# Patient Record
Sex: FEMALE | Race: WHITE | NOT HISPANIC OR LATINO | Employment: OTHER | URBAN - METROPOLITAN AREA
[De-identification: names, ages, dates, MRNs, and addresses within clinical notes are randomized per-mention and may not be internally consistent; named-entity substitution may affect disease eponyms.]

---

## 2017-06-30 ENCOUNTER — THERAPY VISIT (OUTPATIENT)
Dept: PHYSICAL THERAPY | Facility: CLINIC | Age: 77
End: 2017-06-30
Payer: MEDICARE

## 2017-06-30 DIAGNOSIS — M54.42 LEFT-SIDED LOW BACK PAIN WITH LEFT-SIDED SCIATICA: ICD-10-CM

## 2017-06-30 DIAGNOSIS — M25.552 HIP PAIN, LEFT: Primary | ICD-10-CM

## 2017-06-30 PROCEDURE — 97110 THERAPEUTIC EXERCISES: CPT | Mod: GP | Performed by: PHYSICAL THERAPIST

## 2017-06-30 PROCEDURE — G0283 ELEC STIM OTHER THAN WOUND: HCPCS | Mod: GP | Performed by: PHYSICAL THERAPIST

## 2017-06-30 PROCEDURE — 97140 MANUAL THERAPY 1/> REGIONS: CPT | Mod: GP | Performed by: PHYSICAL THERAPIST

## 2017-06-30 PROCEDURE — G8981 BODY POS CURRENT STATUS: HCPCS | Mod: GP | Performed by: PHYSICAL THERAPIST

## 2017-06-30 PROCEDURE — 97161 PT EVAL LOW COMPLEX 20 MIN: CPT | Mod: GP | Performed by: PHYSICAL THERAPIST

## 2017-06-30 PROCEDURE — G8982 BODY POS GOAL STATUS: HCPCS | Mod: GP | Performed by: PHYSICAL THERAPIST

## 2017-06-30 NOTE — PROGRESS NOTES
Subjective:    Patient is a 76 year old female presenting with rehab left hip hpi.   Elzbieta Ivan is a 76 year old female with a left hip condition.  Condition occurred with:  Insidious onset.  Condition occurred: at work.  This is a new condition  MD order 6/7/17. Elzbieta noticed pain in her left hip 3 weeks ago at work, sudden excruciating pain travelling from hip to the lower leg while twisting her hip to the left side to lift some books. The pain was worse she sat down in her chair, the pain got better in her lower back and leg and localized to her left hip. She took Excedrin, the same episode happened again the next day while walking when her left hip gave out and she feel against the wall and rested there for a while. In few days any movement in her legs aggravated her pain so went to [facility]. X ray was taken and was diagnosed with trochanteric bursitis. She was given cortisone shot which did not help. She went to ER next day, MRI was not taken as he legs were in spasm. She was given morphine shot and was given aleve. She went to [facility] next day and was ordered standing MRI. She later took it for hip, which came out clear. She met with [facility] provider, given a shot and took MRI for back and was referred to PT..    Patient reports pain:  Anterior, posterior, lateral and greater trochanter.  Radiates to:  Low back, lower leg and thigh (groin).  Pain is described as aching, sharp and shooting and is constant and reported as 5/10 and 9/10.  Associated symptoms:  Buckling/giving out, loss of motion/stiffness and loss of strength. Pain is the same all the time.  Symptoms are exacerbated by walking, transfers, standing, ascending stairs, descending stairs and lying on extremity (stepping left, reaching with right arm overhead, ) and relieved by rest, NSAID's and analgesics.  Since onset symptoms are gradually improving.  Special tests:  X-ray and MRI.      General health as reported by patient is good.   Pertinent medical history includes:  Osteoarthritis, cancer, depression and fibromyalgia (pain at rest/ night, numbness/ tingling, apnea).  Medical allergies: scopalomine.  Other surgeries include:  Orthopedic surgery (cervical 1965, knee scope, shoulder, hysterectomy, tonsils, appendectomy).  Current medications:  Anti-inflammatory, pain medication and anti-depressants.  Current occupation is Psychologist-sitting .                                    Objective:      Gait:  Decreased jak, step length, stride length  Gait Type:  Antalgic     Deviations:  Lumbar:  Trunk lean R and trunk flexionPelvis:  Decr pelvic rotationHip:  Excessive flexion LKnee:  Knee extension decr L    Flexibility/Screens:       Lower Extremity:  Decreased left lower extremity flexibility:Hip ER's; Adductors; Piriformis; Hip Flexors; Hamstrings and Gastroc                 Lumbar/SI Evaluation              Lumbar Palpation:    Tenderness present at Left:    Piriformis and PSIS          SI joint/Sacrum:        Left positive at:    Squish                                        Hip Evaluation  HIP AROM:  AROM:   Left Hip:        Right Hip:   Normal: unable to extend the hip in lying position due to increased pain in her lateral and anterior hip, Flx with knee flexed to chest with no pain- feels better, IR/ ER and abd is WFL with no pain                     Hip Strength:  Hip Strength:   Left:    Not assessed  Right:                           Hip Special Testing:   Special tests hip not assessed: inconsistent pain with palpation.  Left hip positive for the following special tests:  Piriformis and Cristian      Hip Palpation:    Left hip tenderness present at:   Greater Trachanter; hip flexors; Piriformis; PSIS; Abductors; Gluteus Medius and Bursa               General     ROS    Assessment/Plan:      Patient is a 76 year old female with lumbar, sacral and left side hip complaints.    Patient has the following significant findings with  corresponding treatment plan.                Diagnosis 1:  Left Hip pain, LBP, SI joint pain  Pain -  hot/cold therapy, US, electric stimulation, manual therapy, STS, splint/taping/bracing/orthotics, self management, education and home program  Decreased ROM/flexibility - manual therapy, therapeutic exercise, therapeutic activity and home program  Decreased strength - therapeutic exercise, therapeutic activities and home program  Inflammation - cold therapy, US, electric stimulation and self management/home program  Impaired gait - gait training and home program  Decreased function - therapeutic activities and home program  Impaired posture - neuro re-education, therapeutic activities and home program    Therapy Evaluation Codes:   1) History comprised of:   Personal factors that impact the plan of care:      Time since onset of symptoms.    Comorbidity factors that impact the plan of care are:      Cancer, Depression, Fibromyalgia, Pain at night/rest and Sleep disorder/apnea.     Medications impacting care: Anti-inflammatory, Pain and Steroids.  2) Examination of Body Systems comprised of:   Body structures and functions that impact the plan of care:      Hip, Lumbar spine and Sacral illiac joint.   Activity limitations that impact the plan of care are:      Dressing, Standing, Walking, Sleeping and Laying down.  3) Clinical presentation characteristics are:   Stable/Uncomplicated.  4) Decision-Making    Low complexity using standardized patient assessment instrument and/or measureable assessment of functional outcome.  Cumulative Therapy Evaluation is: Low complexity.    Previous and current functional limitations:  (See Goal Flow Sheet for this information)    Short term and Long term goals: (See Goal Flow Sheet for this information)     Communication ability:  Patient appears to be able to clearly communicate and understand verbal and written communication and follow directions correctly.  Treatment Explanation  - The following has been discussed with the patient:   RX ordered/plan of care  Anticipated outcomes  Possible risks and side effects  This patient would benefit from PT intervention to resume normal activities.   Rehab potential is fair.    Frequency:  2 X week, once daily  Duration:  for 6 weeks  Discharge Plan:  Achieve all LTG.  Independent in home treatment program.  Reach maximal therapeutic benefit.    Patient presented with inconsistent s/s today, some muscle were tender upon palpation in one position as compared to other position. Same muscles were not tender after some time with palpation, some movements were extremly painful but later better. Will try to get the MRI details [facility] to get a better understanding into the problem.     Please refer to the daily flowsheet for treatment today, total treatment time and time spent performing 1:1 timed codes.

## 2017-06-30 NOTE — LETTER
DEPARTMENT OF HEALTH AND HUMAN SERVICES  CENTERS FOR MEDICARE & MEDICAID SERVICES    PLAN/UPDATED PLAN OF PROGRESS FOR OUTPATIENT REHABILITATION    PATIENTS NAME:  Elzbieta Ivan     : 1940    PROVIDER NUMBER:    [id number]    PsychiatricN:   A    [facility]    MEDICAL RECORD NUMBER: [id number]     START OF CARE DATE:  SOC Date: 17   TYPE:  PT    PRIMARY/TREATMENT DIAGNOSIS: (Pertinent Medical Diagnosis)     Hip pain, left  Left-sided low back pain with left-sided sciatica    VISITS FROM START OF CARE:  Rxs Used: 1     Subjective:    Elzbieta Ivan is a 76 year old female with a left hip condition.  Condition occurred with:  Insidious onset.  Condition occurred: at work.  This is a new condition  MD order 17. Elzbieta noticed pain in her left hip 3 weeks ago at work, sudden excruciating pain travelling from hip to the lower leg while twisting her hip to the left side to lift some books. The pain was worse she sat down in her chair, the pain got better in her lower back and leg and localized to her left hip. She took Excedrin, the same episode happened again the next day while walking when her left hip gave out and she feel against the wall and rested there for a while. In few days any movement in her legs aggravated her pain so went to [facility]. X ray was taken and was diagnosed with trochanteric bursitis. She was given cortisone shot which did not help. She went to ER next day, MRI was not taken as he legs were in spasm. She was given morphine shot and was given aleve. She went to [facility] next day and was ordered standing MRI. She later took it for hip, which came out clear. She met with [facility] provider, given a shot and took MRI for back and was referred to PT.  Patient reports pain:  Anterior, posterior, lateral and greater trochanter.  Radiates to:  Low back, lower leg and thigh (groin).  Pain is described as aching, sharp and shooting and is constant  and reported as 5/10 and 9/10.  Associated symptoms:  Buckling/giving out, loss of motion/stiffness and loss of strength. Pain is the same all the time.  Symptoms are exacerbated by walking, transfers, standing, ascending stairs, descending stairs and lying on extremity (stepping left, reaching with right arm overhead, ) and relieved by rest, NSAID's and analgesics.  Since onset symptoms are gradually improving.  Special tests:  X-ray and MRI.      General health as reported by patient is good.  Pertinent medical history includes:  Osteoarthritis, cancer, depression and fibromyalgia (pain at rest/ night, numbness/ tingling, apnea).  Medical allergies: scopalomine.  Other surgeries include:  Orthopedic surgery (cervical 1965, knee scope, shoulder, hysterectomy, tonsils, appendectomy).  Current medications:  Anti-inflammatory, pain medication and anti-depressants.  Current occupation is Psychologist-sitting .      PATIENTS NAME:  Elzbieta Ivan   : 1940  Page 2    Objective:    Gait:  Decreased jak, step length, stride length  Gait Type:  Antalgic     Deviations:  Lumbar:  Trunk lean R and trunk flexionPelvis:  Decr pelvic rotationHip:  Excessive flexion LKnee:  Knee extension decr L  Flexibility/Screens:   Lower Extremity:  Decreased left lower extremity flexibility:Hip ER's; Adductors; Piriformis; Hip Flexors; Hamstrings and Gastroc  Lumbar/SI Evaluation  Lumbar Palpation:    Tenderness present at Left:    Piriformis and PSIS  SI joint/Sacrum:      Left positive at:    Squish  Hip Evaluation  HIP AROM:  AROM:   Left Hip:        Right Hip:   Normal: unable to extend the hip in lying position due to increased pain in her lateral and anterior hip, Flx with knee flexed to chest with no pain- feels better, IR/ ER and abd is WFL with no pain   Hip Strength:  Hip Strength:   Left:    Not assessed  Right:   Hip Special Testing:   Special tests hip not assessed: inconsistent pain with palpation.  Left hip  positive for the following special tests:  Piriformis and Cristian  Hip Palpation:    Left hip tenderness present at:   Greater Trachanter; hip flexors; Piriformis; PSIS; Abductors; Gluteus Medius and Bursa    Assessment/Plan:      Patient is a 76 year old female with lumbar, sacral and left side hip complaints.    Patient has the following significant findings with corresponding treatment plan.                Diagnosis 1:  Left Hip pain, LBP, SI joint pain  Pain -  hot/cold therapy, US, electric stimulation, manual therapy, STS, splint/taping/bracing/orthotics, self management, education and home program  Decreased ROM/flexibility - manual therapy, therapeutic exercise, therapeutic activity and home program  Decreased strength - therapeutic exercise, therapeutic activities and home program  Inflammation - cold therapy, US, electric stimulation and self management/home program  Impaired gait - gait training and home program  Decreased function - therapeutic activities and home program  Impaired posture - neuro re-education, therapeutic activities and home program    Therapy Evaluation Codes:   1) History comprised of:   Personal factors that impact the plan of care:      Time since onset of symptoms.   PATIENTS NAME:  Elbzieta Ivan   : 1940  Page 3     Comorbidity factors that impact the plan of care are:      Cancer, Depression, Fibromyalgia, Pain at night/rest and Sleep disorder/apnea.     Medications impacting care: Anti-inflammatory, Pain and Steroids.  2) Examination of Body Systems comprised of:   Body structures and functions that impact the plan of care:      Hip, Lumbar spine and Sacral illiac joint.   Activity limitations that impact the plan of care are:      Dressing, Standing, Walking, Sleeping and Laying down.  3) Clinical presentation characteristics are:   Stable/Uncomplicated.  4) Decision-Making    Low complexity using standardized patient assessment instrument and/or measureable  "assessment of functional outcome.  Cumulative Therapy Evaluation is: Low complexity.    Previous and current functional limitations:  (See Goal Flow Sheet for this information)    Short term and Long term goals: (See Goal Flow Sheet for this information)     Communication ability:  Patient appears to be able to clearly communicate and understand verbal and written communication and follow directions correctly.  Treatment Explanation - The following has been discussed with the patient:   RX ordered/plan of care  Anticipated outcomes  Possible risks and side effects  This patient would benefit from PT intervention to resume normal activities.   Rehab potential is fair.    Frequency:  2 X week, once daily  Duration:  for 6 weeks  Discharge Plan:  Achieve all LTG.  Independent in home treatment program.  Reach maximal therapeutic benefit.    Patient presented with inconsistent s/s today, some muscle were tender upon palpation in one position as compared to other position. Same muscles were not tender after some time with palpation, some movements were extremly painful but later better. Will try to get the MRI details form [facility] to get a better understanding into the problem.     Caregiver Signature/Credentials _____________________________ Date ________       Treating Provider: Christin Rmoano, PT   I have reviewed and certified the need for these services and plan of treatment while under my care.        PHYSICIAN'S SIGNATURE:   _________________________________________  Date___________   Trent Arriola      PATIENTS NAME:  Chong Ivann   : 1940  Page 4      Certification period:  Beginning of Cert date period: 17 to  End of Cert period date: 17     Functional Level Progress Report: Please see attached \"Goal Flow sheet for Functional level.\"    ____X____ Continue Services or       ________ DC Services                Service dates: From  SOC Date: 17 date to present "

## 2017-07-03 ENCOUNTER — THERAPY VISIT (OUTPATIENT)
Dept: PHYSICAL THERAPY | Facility: CLINIC | Age: 77
End: 2017-07-03
Payer: MEDICARE

## 2017-07-03 DIAGNOSIS — M25.552 HIP PAIN, LEFT: ICD-10-CM

## 2017-07-03 DIAGNOSIS — M54.42 LEFT-SIDED LOW BACK PAIN WITH LEFT-SIDED SCIATICA: ICD-10-CM

## 2017-07-03 PROCEDURE — 97110 THERAPEUTIC EXERCISES: CPT | Mod: GP | Performed by: PHYSICAL THERAPIST

## 2017-07-03 PROCEDURE — 97140 MANUAL THERAPY 1/> REGIONS: CPT | Mod: GP | Performed by: PHYSICAL THERAPIST

## 2017-07-03 PROCEDURE — G0283 ELEC STIM OTHER THAN WOUND: HCPCS | Mod: GP | Performed by: PHYSICAL THERAPIST

## 2017-07-07 ENCOUNTER — THERAPY VISIT (OUTPATIENT)
Dept: PHYSICAL THERAPY | Facility: CLINIC | Age: 77
End: 2017-07-07
Payer: MEDICARE

## 2017-07-07 DIAGNOSIS — M54.42 LEFT-SIDED LOW BACK PAIN WITH LEFT-SIDED SCIATICA: ICD-10-CM

## 2017-07-07 DIAGNOSIS — M25.552 HIP PAIN, LEFT: ICD-10-CM

## 2017-07-07 PROCEDURE — G0283 ELEC STIM OTHER THAN WOUND: HCPCS | Mod: GP | Performed by: PHYSICAL THERAPIST

## 2017-07-07 PROCEDURE — 97110 THERAPEUTIC EXERCISES: CPT | Mod: GP | Performed by: PHYSICAL THERAPIST

## 2017-07-07 PROCEDURE — 97140 MANUAL THERAPY 1/> REGIONS: CPT | Mod: GP | Performed by: PHYSICAL THERAPIST

## 2017-07-10 ENCOUNTER — THERAPY VISIT (OUTPATIENT)
Dept: PHYSICAL THERAPY | Facility: CLINIC | Age: 77
End: 2017-07-10
Payer: MEDICARE

## 2017-07-10 DIAGNOSIS — M54.42 LEFT-SIDED LOW BACK PAIN WITH LEFT-SIDED SCIATICA: ICD-10-CM

## 2017-07-10 DIAGNOSIS — M25.552 HIP PAIN, LEFT: ICD-10-CM

## 2017-07-10 PROCEDURE — G8982 BODY POS GOAL STATUS: HCPCS | Mod: GP | Performed by: PHYSICAL THERAPIST

## 2017-07-10 PROCEDURE — G8983 BODY POS D/C STATUS: HCPCS | Mod: GP | Performed by: PHYSICAL THERAPIST

## 2017-07-10 PROCEDURE — 97110 THERAPEUTIC EXERCISES: CPT | Mod: GP | Performed by: PHYSICAL THERAPIST

## 2017-07-10 PROCEDURE — 97112 NEUROMUSCULAR REEDUCATION: CPT | Mod: GP | Performed by: PHYSICAL THERAPIST

## 2017-10-10 ENCOUNTER — THERAPY VISIT (OUTPATIENT)
Dept: PHYSICAL THERAPY | Facility: CLINIC | Age: 77
End: 2017-10-10
Payer: MEDICARE

## 2017-10-10 DIAGNOSIS — Z98.890 S/P LUMBAR DISCECTOMY: ICD-10-CM

## 2017-10-10 DIAGNOSIS — M54.50 LUMBAGO: Primary | ICD-10-CM

## 2017-10-10 PROCEDURE — G8979 MOBILITY GOAL STATUS: HCPCS | Mod: GP | Performed by: PHYSICAL THERAPIST

## 2017-10-10 PROCEDURE — 97161 PT EVAL LOW COMPLEX 20 MIN: CPT | Mod: GP | Performed by: PHYSICAL THERAPIST

## 2017-10-10 PROCEDURE — 97110 THERAPEUTIC EXERCISES: CPT | Mod: GP | Performed by: PHYSICAL THERAPIST

## 2017-10-10 PROCEDURE — G8978 MOBILITY CURRENT STATUS: HCPCS | Mod: GP | Performed by: PHYSICAL THERAPIST

## 2017-10-10 NOTE — LETTER
DEPARTMENT OF HEALTH AND HUMAN SERVICES  CENTERS FOR MEDICARE & MEDICAID SERVICES    PLAN/UPDATED PLAN OF PROGRESS FOR OUTPATIENT REHABILITATION    PATIENTS NAME:  Elzbieta Ivan     : 1940    PROVIDER NUMBER:    [id number]    [id number]    [facility]    MEDICAL RECORD NUMBER: [id number]     START OF CARE DATE:  SOC Date: 10/10/17   TYPE:  PT    PRIMARY/TREATMENT DIAGNOSIS: (Pertinent Medical Diagnosis)     Lumbago  S/P lumbar discectomy    VISITS FROM START OF CARE:  Rxs Used: 1     Subjective:    Patient is a 76 year old female presenting with rehab back hpi.   Elzbieta Ivan is a 76 year old female with a lumbar condition.  Occurance: s/p surgery.    This is a new condition  MD order 17. Elzbieta went lumbar discectomy(L4-5) on . She was resting for few weeks. She went to [facility] for vacation and noticed weakness in her left leg. Currently wants to learn exercises to strengthen her core and left lower extremity. She notices weakness while climbing the stairs, back hurts while waking up in the morning, prolonged sitting in her couch. She was referred to PT for further management. .    Patient reports pain:  Lower lumbar spine, lumbar spine right and lumbar spine left.    Pain is described as aching and is intermittent and reported as 2/10 and 5/10.  Associated symptoms:  Loss of strength, loss of motion/stiffness and fatigue.   Symptoms are exacerbated by sitting, walking, bending, lifting and carrying and relieved by rest and ice.  Since onset symptoms are gradually improving.    Previous treatment includes surgery.  There was moderate improvement following previous treatment.  General health as reported by patient is good.  Pertinent medical history includes:  Osteoarthritis, cancer, depression and fibromyalgia.  Medical allergies: yes (scopalomine).  Surgical history: cervical 1965, knee scope, shoulder, hysterectomy, tonsils, appendectomy.  Current  medications:  Anti-depressants.  Current occupation is RetiredPrimary job tasks include:  Prolonged sitting.                                Objective:    Flexibility/Screens:     Lower Extremity:  Decreased left lower extremity flexibility:Hamstrings; Gastroc and Soleus    Decreased right lower extremity flexibility:  Hamstrings; Gastroc and Soleus    Lumbar/SI Evaluation  ROM:    AROM Lumbar:   Flexion:            Till ankle with minimal pain  Ext:                    End range pain   Side Bend:        Left:  WFL    Right:  WFL  Rotation:           Left:  WFL    Right:  WFL  Side Glide:        Left:     Right:         Strength: weak abdominals 1+/5, poor TA activation, Back MMT 2/5  Lumbar Myotomes:  normal    Lumbar DTR's:  normal    Cord Signs:  normal    Lumbar Dermtomes:  normal    Neural Tension/Mobility:  Lumbar:  Normal      Lumbar Palpation:    Tenderness present at Left:    Erector Spinae and PSIS  Tenderness present at Right: Erector Spinae and PSIS    Lumbar Provocation:  normal      Shane Lumbar Evaluation    Posture:  Sitting: fair  Standing: fair  Lordosis: WNL  Lateral Shift: no  Correction of Posture: better    Conclusion: posture and dysfunction  Principle of Treatment:  Posture Correction: Sitting with back supported and lumbar roll advised    Extension: encouraged            Assessment/Plan:      Patient is a 76 year old female with lumbar complaints.    Patient has the following significant findings with corresponding treatment plan.                Diagnosis 1:  LBP and weakness- s/p discectomy L4-5  Pain -  hot/cold therapy, US, electric stimulation, manual therapy, STS, splint/taping/bracing/orthotics, self management, education, directional preference exercise and home program  Decreased ROM/flexibility - manual therapy, therapeutic exercise, therapeutic activity and home program  Decreased strength - therapeutic exercise, therapeutic activities and home program  Decreased function -  therapeutic activities and home program    Therapy Evaluation Codes:   1) History comprised of:   Personal factors that impact the plan of care:      Time since onset of symptoms.    Comorbidity factors that impact the plan of care are:      Cancer, Depression, Fibromyalgia and Osteoarthritis.     Medications impacting care: Anti-inflammatory, Muscle relaxant and Pain.  2) Examination of Body Systems comprised of:   Body structures and functions that impact the plan of care:      Lumbar spine.   Activity limitations that impact the plan of care are:      Bending, Lifting, Sitting, Stairs, Standing and Walking.  3) Clinical presentation characteristics are:   Stable/Uncomplicated.  4) Decision-Making    Low complexity using standardized patient assessment instrument and/or measureable assessment of functional outcome.  Cumulative Therapy Evaluation is: Low complexity.    Previous and current functional limitations:  (See Goal Flow Sheet for this information)    Short term and Long term goals: (See Goal Flow Sheet for this information)     Communication ability:  Patient appears to be able to clearly communicate and understand verbal and written communication and follow directions correctly.  Treatment Explanation - The following has been discussed with the patient:   RX ordered/plan of care  Anticipated outcomes  Possible risks and side effects  This patient would benefit from PT intervention to resume normal activities.   Rehab potential is good.    Frequency:  1 X week, once daily  Duration:  for 6 weeks  Discharge Plan:  Achieve all LTG.  Independent in home treatment program.  Reach maximal therapeutic benefit.              Caregiver Signature/Credentials _____________________________ Date ________       Treating Provider: Christin Romano, PT   I have reviewed and certified the need for these services and plan of treatment while under my care.        PHYSICIAN'S SIGNATURE:    "_________________________________________  Date___________   Mansi Chavez CNP    Certification period:  Beginning of Cert date period: 10/10/17 to  End of Cert period date: 01/07/18     Functional Level Progress Report: Please see attached \"Goal Flow sheet for Functional level.\"    ____X____ Continue Services or       ________ DC Services                Service dates: From  SOC Date: 10/10/17 date to present                         "

## 2017-10-10 NOTE — PROGRESS NOTES
Subjective:    Patient is a 76 year old female presenting with rehab back hpi.   Elzbieta Ivan is a 76 year old female with a lumbar condition.  Occurance: s/p surgery.    This is a new condition  MD order 9/1/17. Elzbieta went lumbar discectomy(L4-5) on July 21st. She was resting for few weeks. She went to [facility] for vacation and noticed weakness in her left leg. Currently wants to learn exercises to strengthen her core and left lower extremity. She notices weakness while climbing the stairs, back hurts while waking up in the morning, prolonged sitting in her couch. She was referred to PT for further management. .    Patient reports pain:  Lower lumbar spine, lumbar spine right and lumbar spine left.    Pain is described as aching and is intermittent and reported as 2/10 and 5/10.  Associated symptoms:  Loss of strength, loss of motion/stiffness and fatigue.   Symptoms are exacerbated by sitting, walking, bending, lifting and carrying and relieved by rest and ice.  Since onset symptoms are gradually improving.    Previous treatment includes surgery.  There was moderate improvement following previous treatment.  General health as reported by patient is good.  Pertinent medical history includes:  Osteoarthritis, cancer, depression and fibromyalgia.  Medical allergies: yes (scopalomine).  Surgical history: cervical 1965, knee scope, shoulder, hysterectomy, tonsils, appendectomy.  Current medications:  Anti-depressants.  Current occupation is Retired    .    Primary job tasks include:  Prolonged sitting.                                Objective:          Flexibility/Screens:       Lower Extremity:  Decreased left lower extremity flexibility:Hamstrings; Gastroc and Soleus    Decreased right lower extremity flexibility:  Hamstrings; Gastroc and Soleus               Lumbar/SI Evaluation  ROM:    AROM Lumbar:   Flexion:            Till ankle with minimal pain  Ext:                    End range pain   Side Bend:         Left:  WFL    Right:  WFL  Rotation:           Left:  WFL    Right:  WFL  Side Glide:        Left:     Right:         Strength: weak abdominals 1+/5, poor TA activation, Back MMT 2/5  Lumbar Myotomes:  normal            Lumbar DTR's:  normal      Cord Signs:  normal    Lumbar Dermtomes:  normal                Neural Tension/Mobility:  Lumbar:  Normal        Lumbar Palpation:    Tenderness present at Left:    Erector Spinae and PSIS  Tenderness present at Right: Erector Spinae and PSIS    Lumbar Provocation:  normal                                                       Shane Lumbar Evaluation    Posture:  Sitting: fair  Standing: fair  Lordosis: WNL  Lateral Shift: no  Correction of Posture: better          Conclusion: posture and dysfunction  Principle of Treatment:  Posture Correction: Sitting with back supported and lumbar roll advised    Extension: encouraged                                           ROS    Assessment/Plan:      Patient is a 76 year old female with lumbar complaints.    Patient has the following significant findings with corresponding treatment plan.                Diagnosis 1:  LBP and weakness- s/p discectomy L4-5  Pain -  hot/cold therapy, US, electric stimulation, manual therapy, STS, splint/taping/bracing/orthotics, self management, education, directional preference exercise and home program  Decreased ROM/flexibility - manual therapy, therapeutic exercise, therapeutic activity and home program  Decreased strength - therapeutic exercise, therapeutic activities and home program  Decreased function - therapeutic activities and home program    Therapy Evaluation Codes:   1) History comprised of:   Personal factors that impact the plan of care:      Time since onset of symptoms.    Comorbidity factors that impact the plan of care are:      Cancer, Depression, Fibromyalgia and Osteoarthritis.     Medications impacting care: Anti-inflammatory, Muscle relaxant and Pain.  2) Examination of Body  Systems comprised of:   Body structures and functions that impact the plan of care:      Lumbar spine.   Activity limitations that impact the plan of care are:      Bending, Lifting, Sitting, Stairs, Standing and Walking.  3) Clinical presentation characteristics are:   Stable/Uncomplicated.  4) Decision-Making    Low complexity using standardized patient assessment instrument and/or measureable assessment of functional outcome.  Cumulative Therapy Evaluation is: Low complexity.    Previous and current functional limitations:  (See Goal Flow Sheet for this information)    Short term and Long term goals: (See Goal Flow Sheet for this information)     Communication ability:  Patient appears to be able to clearly communicate and understand verbal and written communication and follow directions correctly.  Treatment Explanation - The following has been discussed with the patient:   RX ordered/plan of care  Anticipated outcomes  Possible risks and side effects  This patient would benefit from PT intervention to resume normal activities.   Rehab potential is good.    Frequency:  1 X week, once daily  Duration:  for 6 weeks  Discharge Plan:  Achieve all LTG.  Independent in home treatment program.  Reach maximal therapeutic benefit.    Please refer to the daily flowsheet for treatment today, total treatment time and time spent performing 1:1 timed codes.

## 2017-10-18 ENCOUNTER — THERAPY VISIT (OUTPATIENT)
Dept: PHYSICAL THERAPY | Facility: CLINIC | Age: 77
End: 2017-10-18
Payer: MEDICARE

## 2017-10-18 DIAGNOSIS — Z98.890 S/P LUMBAR DISCECTOMY: ICD-10-CM

## 2017-10-18 DIAGNOSIS — M54.50 LUMBAGO: ICD-10-CM

## 2017-10-18 PROCEDURE — 97110 THERAPEUTIC EXERCISES: CPT | Mod: GP | Performed by: PHYSICAL THERAPIST

## 2017-10-18 PROCEDURE — 97112 NEUROMUSCULAR REEDUCATION: CPT | Mod: GP | Performed by: PHYSICAL THERAPIST

## 2017-10-24 ENCOUNTER — THERAPY VISIT (OUTPATIENT)
Dept: PHYSICAL THERAPY | Facility: CLINIC | Age: 77
End: 2017-10-24
Payer: MEDICARE

## 2017-10-24 DIAGNOSIS — M54.50 LUMBAGO: ICD-10-CM

## 2017-10-24 DIAGNOSIS — Z98.890 S/P LUMBAR DISCECTOMY: ICD-10-CM

## 2017-10-24 PROCEDURE — 97110 THERAPEUTIC EXERCISES: CPT | Mod: GP | Performed by: PHYSICAL THERAPIST

## 2017-10-24 PROCEDURE — 97112 NEUROMUSCULAR REEDUCATION: CPT | Mod: GP | Performed by: PHYSICAL THERAPIST

## 2017-10-31 ENCOUNTER — THERAPY VISIT (OUTPATIENT)
Dept: PHYSICAL THERAPY | Facility: CLINIC | Age: 77
End: 2017-10-31
Payer: MEDICARE

## 2017-10-31 DIAGNOSIS — Z98.890 S/P LUMBAR DISCECTOMY: ICD-10-CM

## 2017-10-31 DIAGNOSIS — M54.50 LUMBAGO: ICD-10-CM

## 2017-10-31 PROCEDURE — 97110 THERAPEUTIC EXERCISES: CPT | Mod: GP | Performed by: PHYSICAL THERAPIST

## 2017-10-31 PROCEDURE — 97112 NEUROMUSCULAR REEDUCATION: CPT | Mod: GP | Performed by: PHYSICAL THERAPIST

## 2017-11-06 PROBLEM — M54.42 LEFT-SIDED LOW BACK PAIN WITH LEFT-SIDED SCIATICA: Status: RESOLVED | Noted: 2017-06-30 | Resolved: 2017-11-06

## 2017-11-06 PROBLEM — M25.552 HIP PAIN, LEFT: Status: RESOLVED | Noted: 2017-06-30 | Resolved: 2017-11-06

## 2017-11-06 NOTE — PROGRESS NOTES
Subjective:    HPI  Oswestry Score: 40 %                 Objective:    System    Physical Exam    General     ROS    Assessment/Plan:      DISCHARGE REPORT    Progress reporting period is from 6/30/17 to 11/6/17.       SUBJECTIVE  Subjective changes noted by patient:  Subjective: Elzbieta states her pain is improving but not her numbness. She is going to meet with the neurologist next week. She is able to sleep flat in her bed and able to roll over with minial pain. Tolerated prone lying for 8 min today during TRINA exercise with no pain just discomfort in her lower back.     Current pain level is  Current Pain level: 6/10 (due to garden work over the weekend).     Previous pain level was    .   Changes in function:  Yes (See Goal flowsheet attached for changes in current functional level)  Adverse reaction to treatment or activity: None    OBJECTIVE  Changes noted in objective findings:  Yes,   Objective: No tenderness over the gluteal muscle upon palpation. Pain down her anterior thigh with movements stated. intermittent numbness lower leg anterior aspect. Hip AROM improving with decreasin pain levels.      ASSESSMENT/PLAN  Updated problem list and treatment plan: Diagnosis 1:  Left hip pain    STG/LTGs have been met or progress has been made towards goals:  Yes (See Goal flow sheet completed today.)  Assessment of Progress: The patient has had set backs in their progress.  The patient's condition has exacerbated.  Self Management Plans:  Patient has been instructed in a home treatment program.  Patient  has been instructed in self management of symptoms.  I have re-evaluated this patient and find that the nature, scope, duration and intensity of the therapy is appropriate for the medical condition of the patient.  Elzbieta continues to require the following intervention to meet STG and LTG's:  PT intervention is no longer required to meet STG/LTG.    Recommendations:  This patient is ready to be discharged from  therapy and continue their home treatment program.    Please refer to the daily flowsheet for treatment today, total treatment time and time spent performing 1:1 timed codes.

## 2017-11-14 ENCOUNTER — THERAPY VISIT (OUTPATIENT)
Dept: PHYSICAL THERAPY | Facility: CLINIC | Age: 77
End: 2017-11-14
Payer: MEDICARE

## 2017-11-14 DIAGNOSIS — M54.50 LUMBAGO: ICD-10-CM

## 2017-11-14 DIAGNOSIS — Z98.890 S/P LUMBAR DISCECTOMY: ICD-10-CM

## 2017-11-14 PROCEDURE — G8979 MOBILITY GOAL STATUS: HCPCS | Mod: GP | Performed by: PHYSICAL THERAPIST

## 2017-11-14 PROCEDURE — 97110 THERAPEUTIC EXERCISES: CPT | Mod: GP | Performed by: PHYSICAL THERAPIST

## 2017-11-14 PROCEDURE — 97112 NEUROMUSCULAR REEDUCATION: CPT | Mod: GP | Performed by: PHYSICAL THERAPIST

## 2017-11-14 PROCEDURE — 97140 MANUAL THERAPY 1/> REGIONS: CPT | Mod: GP | Performed by: PHYSICAL THERAPIST

## 2017-11-14 PROCEDURE — G8980 MOBILITY D/C STATUS: HCPCS | Mod: GP | Performed by: PHYSICAL THERAPIST

## 2017-11-14 NOTE — LETTER
[facility]  [address] [address] [place] [id number]  [phone]    2017    Re: Elzbieta vIan   :   1940  MRN:  [id number]   REFERRING PHYSICIAN:   Mansi Chavez    [facility]    Date of Initial Evaluation:  10/10/17  Visits:  Rxs Used: 5  Reason for Referral:     Lumbago  S/P lumbar discectomy    DISCHARGE REPORT    Progress reporting period is from 10/10/17 to 17.       SUBJECTIVE  Subjective changes noted by patient:  Subjective: Moises states she feels better, occasional pain in her back on both sides. Able to perform her ADL without any discomfort. Wants to discahrge with HEP    Current pain level is  Current Pain level: 0/10 (1-2/10 ocassional).     Previous pain level was    .   Changes in function:  Yes (See Goal flowsheet attached for changes in current functional level)  Adverse reaction to treatment or activity: None  Oswestry Score: 2.22 %     OBJECTIVE  Changes noted in objective findings:  Yes,   Objective: Improved flexibility demonstrated adn improving core strength and stabilization. Posture good without cues.     ASSESSMENT/PLAN  Updated problem list and treatment plan: Diagnosis 1:  LBP- S/P discectomy    STG/LTGs have been met or progress has been made towards goals:  Yes (See Goal flow sheet completed today.)  Assessment of Progress: The patient's condition is improving.  The patient's condition has potential to improve.  The patient has met all of their long term goals.  Self Management Plans:  Patient is independent in a home treatment program.  Patient is independent in self management of symptoms.  I have re-evaluated this patient and find that the nature, scope, duration and intensity of the therapy is appropriate for the medical condition of the patient.  Elzbieta continues to require the following intervention to meet STG and LTG's:  PT intervention is no longer required to meet  STG/LTG.    Recommendations:  This patient is ready to be discharged from therapy and continue their home treatment program.    Thank you for your referral.    INQUIRIES  Therapist: Christin Chapman PT   [address] [address]  Phone: [phone]  Fax: [phone]

## 2017-11-14 NOTE — PROGRESS NOTES
Subjective:    HPI  Oswestry Score: 2.22 %                 Objective:    System    Physical Exam    General     ROS    Assessment/Plan:      DISCHARGE REPORT    Progress reporting period is from 10/10/17 to 11/14/17.       SUBJECTIVE  Subjective changes noted by patient:  Subjective: Moises states she feels better, occasional pain in her back on both sides. Able to perform her ADL without any discomfort. Wants to discahrge with HEP    Current pain level is  Current Pain level: 0/10 (1-2/10 ocassional).     Previous pain level was    .   Changes in function:  Yes (See Goal flowsheet attached for changes in current functional level)  Adverse reaction to treatment or activity: None    OBJECTIVE  Changes noted in objective findings:  Yes,   Objective: Improved flexibility demonstrated adn improving core strength and stabilization. Posture good without cues.     ASSESSMENT/PLAN  Updated problem list and treatment plan: Diagnosis 1:  LBP- S/P discectomy    STG/LTGs have been met or progress has been made towards goals:  Yes (See Goal flow sheet completed today.)  Assessment of Progress: The patient's condition is improving.  The patient's condition has potential to improve.  The patient has met all of their long term goals.  Self Management Plans:  Patient is independent in a home treatment program.  Patient is independent in self management of symptoms.  I have re-evaluated this patient and find that the nature, scope, duration and intensity of the therapy is appropriate for the medical condition of the patient.  Elzbieta continues to require the following intervention to meet STG and LTG's:  PT intervention is no longer required to meet STG/LTG.    Recommendations:  This patient is ready to be discharged from therapy and continue their home treatment program.    Please refer to the daily flowsheet for treatment today, total treatment time and time spent performing 1:1 timed codes.

## 2018-07-05 ENCOUNTER — HOSPITAL ENCOUNTER (OUTPATIENT)
Dept: CT IMAGING | Facility: CLINIC | Age: 78
Discharge: HOME | End: 2018-07-05
Attending: PREVENTIVE MEDICINE | Admitting: PREVENTIVE MEDICINE

## 2018-07-05 DIAGNOSIS — Z00.6 RESEARCH EXAM: ICD-10-CM

## 2018-07-05 PROCEDURE — 75571 CT HRT W/O DYE W/CA TEST: CPT | Mod: TC

## 2018-08-28 RX ORDER — DULOXETIN HYDROCHLORIDE 60 MG/1
60 CAPSULE, DELAYED RELEASE ORAL DAILY
COMMUNITY
End: 2022-12-06

## 2018-08-28 RX ORDER — OXYCODONE AND ACETAMINOPHEN 5; 325 MG/1; MG/1
1 TABLET ORAL EVERY 6 HOURS PRN
Status: ON HOLD | COMMUNITY
End: 2020-02-04

## 2018-08-28 RX ORDER — SIMVASTATIN 40 MG
40 TABLET ORAL DAILY
COMMUNITY
End: 2024-03-27

## 2018-08-30 ENCOUNTER — ANESTHESIA (OUTPATIENT)
Dept: SURGERY | Facility: CLINIC | Age: 78
DRG: 483 | End: 2018-08-30
Payer: MEDICARE

## 2018-08-30 ENCOUNTER — HOSPITAL ENCOUNTER (INPATIENT)
Facility: CLINIC | Age: 78
LOS: 2 days | Discharge: HOME | DRG: 483 | End: 2018-09-01
Attending: ORTHOPAEDIC SURGERY | Admitting: ORTHOPAEDIC SURGERY
Payer: MEDICARE

## 2018-08-30 ENCOUNTER — ANESTHESIA EVENT (OUTPATIENT)
Dept: SURGERY | Facility: CLINIC | Age: 78
DRG: 483 | End: 2018-08-30
Payer: MEDICARE

## 2018-08-30 ENCOUNTER — APPOINTMENT (OUTPATIENT)
Dept: GENERAL RADIOLOGY | Facility: CLINIC | Age: 78
DRG: 483 | End: 2018-08-30
Attending: ORTHOPAEDIC SURGERY
Payer: MEDICARE

## 2018-08-30 DIAGNOSIS — Z96.612 STATUS POST REVERSE TOTAL REPLACEMENT OF LEFT SHOULDER: Primary | ICD-10-CM

## 2018-08-30 PROBLEM — Z96.619 S/P REVERSE TOTAL SHOULDER ARTHROPLASTY: Status: ACTIVE | Noted: 2018-08-30

## 2018-08-30 LAB
ANION GAP SERPL CALCULATED.3IONS-SCNC: 8 MMOL/L (ref 3–14)
BUN SERPL-MCNC: 17 MG/DL (ref 7–30)
CALCIUM SERPL-MCNC: 8.7 MG/DL (ref 8.5–10.1)
CHLORIDE SERPL-SCNC: 105 MMOL/L (ref 94–109)
CO2 SERPL-SCNC: 27 MMOL/L (ref 20–32)
CREAT SERPL-MCNC: 0.85 MG/DL (ref 0.52–1.04)
GFR SERPL CREATININE-BSD FRML MDRD: 65 ML/MIN/1.7M2
GLUCOSE SERPL-MCNC: 86 MG/DL (ref 70–99)
HGB BLD-MCNC: 11.6 G/DL (ref 11.7–15.7)
INR PPP: 0.96 (ref 0.86–1.14)
POTASSIUM SERPL-SCNC: 4.1 MMOL/L (ref 3.4–5.3)
SODIUM SERPL-SCNC: 140 MMOL/L (ref 133–144)

## 2018-08-30 PROCEDURE — 25000128 H RX IP 250 OP 636: Performed by: NURSE ANESTHETIST, CERTIFIED REGISTERED

## 2018-08-30 PROCEDURE — 99222 1ST HOSP IP/OBS MODERATE 55: CPT | Performed by: PHYSICIAN ASSISTANT

## 2018-08-30 PROCEDURE — 40000169 ZZH STATISTIC PRE-PROCEDURE ASSESSMENT I: Performed by: ORTHOPAEDIC SURGERY

## 2018-08-30 PROCEDURE — 85610 PROTHROMBIN TIME: CPT | Performed by: ORTHOPAEDIC SURGERY

## 2018-08-30 PROCEDURE — 12000007 ZZH R&B INTERMEDIATE

## 2018-08-30 PROCEDURE — 25000128 H RX IP 250 OP 636: Performed by: ORTHOPAEDIC SURGERY

## 2018-08-30 PROCEDURE — 25800025 ZZH RX 258: Performed by: ORTHOPAEDIC SURGERY

## 2018-08-30 PROCEDURE — 27210995 ZZH RX 272: Performed by: ORTHOPAEDIC SURGERY

## 2018-08-30 PROCEDURE — 99207 ZZC CONSULT E&M CHANGED TO INITIAL LEVEL: CPT | Performed by: PHYSICIAN ASSISTANT

## 2018-08-30 PROCEDURE — C1776 JOINT DEVICE (IMPLANTABLE): HCPCS | Performed by: ORTHOPAEDIC SURGERY

## 2018-08-30 PROCEDURE — 25000128 H RX IP 250 OP 636: Performed by: ANESTHESIOLOGY

## 2018-08-30 PROCEDURE — 36000093 ZZH SURGERY LEVEL 4 1ST 30 MIN: Performed by: ORTHOPAEDIC SURGERY

## 2018-08-30 PROCEDURE — 37000009 ZZH ANESTHESIA TECHNICAL FEE, EACH ADDTL 15 MIN: Performed by: ORTHOPAEDIC SURGERY

## 2018-08-30 PROCEDURE — 27210794 ZZH OR GENERAL SUPPLY STERILE: Performed by: ORTHOPAEDIC SURGERY

## 2018-08-30 PROCEDURE — 85018 HEMOGLOBIN: CPT | Performed by: ORTHOPAEDIC SURGERY

## 2018-08-30 PROCEDURE — 80048 BASIC METABOLIC PNL TOTAL CA: CPT | Performed by: ORTHOPAEDIC SURGERY

## 2018-08-30 PROCEDURE — 36415 COLL VENOUS BLD VENIPUNCTURE: CPT | Performed by: ORTHOPAEDIC SURGERY

## 2018-08-30 PROCEDURE — 25000125 ZZHC RX 250: Performed by: NURSE ANESTHETIST, CERTIFIED REGISTERED

## 2018-08-30 PROCEDURE — 0RRK00Z REPLACEMENT OF LEFT SHOULDER JOINT WITH REVERSE BALL AND SOCKET SYNTHETIC SUBSTITUTE, OPEN APPROACH: ICD-10-PCS | Performed by: ORTHOPAEDIC SURGERY

## 2018-08-30 PROCEDURE — 27810169 ZZH OR IMPLANT GENERAL: Performed by: ORTHOPAEDIC SURGERY

## 2018-08-30 PROCEDURE — 40000986 XR SHOULDER LT PORT G/E 2 VW: Mod: LT

## 2018-08-30 PROCEDURE — 71000012 ZZH RECOVERY PHASE 1 LEVEL 1 FIRST HR: Performed by: ORTHOPAEDIC SURGERY

## 2018-08-30 PROCEDURE — A9270 NON-COVERED ITEM OR SERVICE: HCPCS | Mod: GY | Performed by: ORTHOPAEDIC SURGERY

## 2018-08-30 PROCEDURE — 25000125 ZZHC RX 250: Performed by: ORTHOPAEDIC SURGERY

## 2018-08-30 PROCEDURE — 37000008 ZZH ANESTHESIA TECHNICAL FEE, 1ST 30 MIN: Performed by: ORTHOPAEDIC SURGERY

## 2018-08-30 PROCEDURE — 25000566 ZZH SEVOFLURANE, EA 15 MIN: Performed by: ORTHOPAEDIC SURGERY

## 2018-08-30 PROCEDURE — 25000132 ZZH RX MED GY IP 250 OP 250 PS 637: Performed by: ORTHOPAEDIC SURGERY

## 2018-08-30 PROCEDURE — 36000063 ZZH SURGERY LEVEL 4 EA 15 ADDTL MIN: Performed by: ORTHOPAEDIC SURGERY

## 2018-08-30 PROCEDURE — C1713 ANCHOR/SCREW BN/BN,TIS/BN: HCPCS | Performed by: ORTHOPAEDIC SURGERY

## 2018-08-30 DEVICE — IMP SCR LOCKING 4.5X32MM AQLS DWD032: Type: IMPLANTABLE DEVICE | Site: SHOULDER | Status: FUNCTIONAL

## 2018-08-30 DEVICE — IMP SHOULDER HUMERAL HEAD +9MM 36MM DWB994: Type: IMPLANTABLE DEVICE | Site: SHOULDER | Status: FUNCTIONAL

## 2018-08-30 DEVICE — CEMENT RESTRICTOR 24MM EBO101: Type: IMPLANTABLE DEVICE | Site: SHOULDER | Status: FUNCTIONAL

## 2018-08-30 DEVICE — IMP COMP SHLDR GLENOID SPHERE AQLS REV II 36X25MM DWD180: Type: IMPLANTABLE DEVICE | Site: SHOULDER | Status: FUNCTIONAL

## 2018-08-30 DEVICE — IMP SCR LOCKING 4.5X20MM AQLS DWD020: Type: IMPLANTABLE DEVICE | Site: SHOULDER | Status: FUNCTIONAL

## 2018-08-30 DEVICE — IMP STEM SHOULDER 9.0X100MM AQLS DWB945: Type: IMPLANTABLE DEVICE | Site: SHOULDER | Status: FUNCTIONAL

## 2018-08-30 DEVICE — IMP SHOULDER HUMERAL HEAD METAPHYSIS 36MM DWB960: Type: IMPLANTABLE DEVICE | Site: SHOULDER | Status: FUNCTIONAL

## 2018-08-30 DEVICE — IMP GLENOID SHOULDER +9MM 36MM AQLS DWB931: Type: IMPLANTABLE DEVICE | Site: SHOULDER | Status: FUNCTIONAL

## 2018-08-30 DEVICE — BONE CEMENT RADIOPAQUE SIMPLEX HV FULL DOSE 6194-1-001: Type: IMPLANTABLE DEVICE | Site: SHOULDER | Status: FUNCTIONAL

## 2018-08-30 DEVICE — IMP SCR FIXATION 4.5X18MM AQLS VDV218: Type: IMPLANTABLE DEVICE | Site: SHOULDER | Status: FUNCTIONAL

## 2018-08-30 DEVICE — IMP BASEPLATE SHLDR GLENOID AQLS REV II 25MM DWD170: Type: IMPLANTABLE DEVICE | Site: SHOULDER | Status: FUNCTIONAL

## 2018-08-30 RX ORDER — LIDOCAINE HYDROCHLORIDE 20 MG/ML
INJECTION, SOLUTION INFILTRATION; PERINEURAL PRN
Status: DISCONTINUED | OUTPATIENT
Start: 2018-08-30 | End: 2018-08-30

## 2018-08-30 RX ORDER — SIMVASTATIN 40 MG
40 TABLET ORAL DAILY
Status: DISCONTINUED | OUTPATIENT
Start: 2018-08-31 | End: 2018-09-01 | Stop reason: HOSPADM

## 2018-08-30 RX ORDER — PROCHLORPERAZINE MALEATE 5 MG
5 TABLET ORAL EVERY 6 HOURS PRN
Status: DISCONTINUED | OUTPATIENT
Start: 2018-08-30 | End: 2018-09-01 | Stop reason: HOSPADM

## 2018-08-30 RX ORDER — DEXAMETHASONE SODIUM PHOSPHATE 4 MG/ML
INJECTION, SOLUTION INTRA-ARTICULAR; INTRALESIONAL; INTRAMUSCULAR; INTRAVENOUS; SOFT TISSUE PRN
Status: DISCONTINUED | OUTPATIENT
Start: 2018-08-30 | End: 2018-08-30

## 2018-08-30 RX ORDER — ACETAMINOPHEN 325 MG/1
650 TABLET ORAL EVERY 4 HOURS PRN
Status: DISCONTINUED | OUTPATIENT
Start: 2018-09-02 | End: 2018-09-01 | Stop reason: HOSPADM

## 2018-08-30 RX ORDER — ONDANSETRON 2 MG/ML
INJECTION INTRAMUSCULAR; INTRAVENOUS PRN
Status: DISCONTINUED | OUTPATIENT
Start: 2018-08-30 | End: 2018-08-30

## 2018-08-30 RX ORDER — PROPOFOL 10 MG/ML
INJECTION, EMULSION INTRAVENOUS PRN
Status: DISCONTINUED | OUTPATIENT
Start: 2018-08-30 | End: 2018-08-30

## 2018-08-30 RX ORDER — OXYCODONE HYDROCHLORIDE 5 MG/1
5 TABLET ORAL
Status: DISCONTINUED | OUTPATIENT
Start: 2018-08-30 | End: 2018-09-01 | Stop reason: HOSPADM

## 2018-08-30 RX ORDER — ALPRAZOLAM 0.5 MG/1
.125-.25 TABLET, EXTENDED RELEASE ORAL DAILY PRN
Status: DISCONTINUED | OUTPATIENT
Start: 2018-08-30 | End: 2018-09-01 | Stop reason: HOSPADM

## 2018-08-30 RX ORDER — MAGNESIUM HYDROXIDE 1200 MG/15ML
LIQUID ORAL PRN
Status: DISCONTINUED | OUTPATIENT
Start: 2018-08-30 | End: 2018-08-30 | Stop reason: HOSPADM

## 2018-08-30 RX ORDER — ONDANSETRON 2 MG/ML
4 INJECTION INTRAMUSCULAR; INTRAVENOUS EVERY 6 HOURS PRN
Status: DISCONTINUED | OUTPATIENT
Start: 2018-08-30 | End: 2018-09-01 | Stop reason: HOSPADM

## 2018-08-30 RX ORDER — ACETAMINOPHEN 325 MG/1
975 TABLET ORAL EVERY 8 HOURS
Status: DISCONTINUED | OUTPATIENT
Start: 2018-08-30 | End: 2018-09-01 | Stop reason: HOSPADM

## 2018-08-30 RX ORDER — SODIUM CHLORIDE, SODIUM LACTATE, POTASSIUM CHLORIDE, CALCIUM CHLORIDE 600; 310; 30; 20 MG/100ML; MG/100ML; MG/100ML; MG/100ML
INJECTION, SOLUTION INTRAVENOUS CONTINUOUS
Status: DISCONTINUED | OUTPATIENT
Start: 2018-08-30 | End: 2018-08-30 | Stop reason: HOSPADM

## 2018-08-30 RX ORDER — NALOXONE HYDROCHLORIDE 0.4 MG/ML
.1-.4 INJECTION, SOLUTION INTRAMUSCULAR; INTRAVENOUS; SUBCUTANEOUS
Status: DISCONTINUED | OUTPATIENT
Start: 2018-08-30 | End: 2018-09-01 | Stop reason: HOSPADM

## 2018-08-30 RX ORDER — ONDANSETRON 4 MG/1
4 TABLET, ORALLY DISINTEGRATING ORAL EVERY 6 HOURS PRN
Status: DISCONTINUED | OUTPATIENT
Start: 2018-08-30 | End: 2018-09-01 | Stop reason: HOSPADM

## 2018-08-30 RX ORDER — SODIUM CHLORIDE, SODIUM LACTATE, POTASSIUM CHLORIDE, CALCIUM CHLORIDE 600; 310; 30; 20 MG/100ML; MG/100ML; MG/100ML; MG/100ML
INJECTION, SOLUTION INTRAVENOUS CONTINUOUS
Status: DISCONTINUED | OUTPATIENT
Start: 2018-08-30 | End: 2018-08-30 | Stop reason: ALTCHOICE

## 2018-08-30 RX ORDER — NEOSTIGMINE METHYLSULFATE 1 MG/ML
VIAL (ML) INJECTION PRN
Status: DISCONTINUED | OUTPATIENT
Start: 2018-08-30 | End: 2018-08-30

## 2018-08-30 RX ORDER — DIPHENHYDRAMINE HCL 12.5MG/5ML
12.5 LIQUID (ML) ORAL EVERY 6 HOURS PRN
Status: DISCONTINUED | OUTPATIENT
Start: 2018-08-30 | End: 2018-09-01 | Stop reason: HOSPADM

## 2018-08-30 RX ORDER — AMOXICILLIN 250 MG
2 CAPSULE ORAL 2 TIMES DAILY
Status: DISCONTINUED | OUTPATIENT
Start: 2018-08-30 | End: 2018-09-01 | Stop reason: HOSPADM

## 2018-08-30 RX ORDER — CEFAZOLIN SODIUM 2 G/100ML
2 INJECTION, SOLUTION INTRAVENOUS
Status: COMPLETED | OUTPATIENT
Start: 2018-08-30 | End: 2018-08-30

## 2018-08-30 RX ORDER — HYDROMORPHONE HYDROCHLORIDE 1 MG/ML
0.2 INJECTION, SOLUTION INTRAMUSCULAR; INTRAVENOUS; SUBCUTANEOUS
Status: DISCONTINUED | OUTPATIENT
Start: 2018-08-30 | End: 2018-08-31

## 2018-08-30 RX ORDER — CEFAZOLIN SODIUM 1 G/3ML
1 INJECTION, POWDER, FOR SOLUTION INTRAMUSCULAR; INTRAVENOUS EVERY 8 HOURS
Status: COMPLETED | OUTPATIENT
Start: 2018-08-31 | End: 2018-08-31

## 2018-08-30 RX ORDER — NALOXONE HYDROCHLORIDE 0.4 MG/ML
.1-.4 INJECTION, SOLUTION INTRAMUSCULAR; INTRAVENOUS; SUBCUTANEOUS
Status: DISCONTINUED | OUTPATIENT
Start: 2018-08-30 | End: 2018-08-31

## 2018-08-30 RX ORDER — POLYETHYLENE GLYCOL 3350 17 G/17G
0.25 POWDER, FOR SOLUTION ORAL DAILY
Status: ON HOLD | COMMUNITY
End: 2020-02-04

## 2018-08-30 RX ORDER — ONDANSETRON 4 MG/1
4 TABLET, ORALLY DISINTEGRATING ORAL EVERY 30 MIN PRN
Status: DISCONTINUED | OUTPATIENT
Start: 2018-08-30 | End: 2018-08-30 | Stop reason: HOSPADM

## 2018-08-30 RX ORDER — ONDANSETRON 2 MG/ML
4 INJECTION INTRAMUSCULAR; INTRAVENOUS EVERY 30 MIN PRN
Status: DISCONTINUED | OUTPATIENT
Start: 2018-08-30 | End: 2018-08-30 | Stop reason: HOSPADM

## 2018-08-30 RX ORDER — METHOCARBAMOL 500 MG/1
500 TABLET, FILM COATED ORAL 4 TIMES DAILY PRN
Status: DISCONTINUED | OUTPATIENT
Start: 2018-08-30 | End: 2018-09-01 | Stop reason: HOSPADM

## 2018-08-30 RX ORDER — LIDOCAINE 40 MG/G
CREAM TOPICAL
Status: DISCONTINUED | OUTPATIENT
Start: 2018-08-30 | End: 2018-09-01 | Stop reason: HOSPADM

## 2018-08-30 RX ORDER — AMOXICILLIN 250 MG
1 CAPSULE ORAL 2 TIMES DAILY
Status: DISCONTINUED | OUTPATIENT
Start: 2018-08-30 | End: 2018-09-01 | Stop reason: HOSPADM

## 2018-08-30 RX ORDER — TEMAZEPAM 7.5 MG/1
7.5 CAPSULE ORAL
Status: DISCONTINUED | OUTPATIENT
Start: 2018-08-31 | End: 2018-09-01 | Stop reason: HOSPADM

## 2018-08-30 RX ORDER — DIPHENHYDRAMINE HYDROCHLORIDE 50 MG/ML
12.5 INJECTION INTRAMUSCULAR; INTRAVENOUS EVERY 6 HOURS PRN
Status: DISCONTINUED | OUTPATIENT
Start: 2018-08-30 | End: 2018-09-01 | Stop reason: HOSPADM

## 2018-08-30 RX ORDER — HYDROMORPHONE HYDROCHLORIDE 1 MG/ML
.3-.5 INJECTION, SOLUTION INTRAMUSCULAR; INTRAVENOUS; SUBCUTANEOUS EVERY 5 MIN PRN
Status: DISCONTINUED | OUTPATIENT
Start: 2018-08-30 | End: 2018-08-30 | Stop reason: HOSPADM

## 2018-08-30 RX ORDER — CEFAZOLIN SODIUM 1 G/3ML
1 INJECTION, POWDER, FOR SOLUTION INTRAMUSCULAR; INTRAVENOUS SEE ADMIN INSTRUCTIONS
Status: DISCONTINUED | OUTPATIENT
Start: 2018-08-30 | End: 2018-08-30

## 2018-08-30 RX ORDER — FENTANYL CITRATE 50 UG/ML
50-100 INJECTION, SOLUTION INTRAMUSCULAR; INTRAVENOUS
Status: COMPLETED | OUTPATIENT
Start: 2018-08-30 | End: 2018-08-30

## 2018-08-30 RX ORDER — FENTANYL CITRATE 50 UG/ML
25-50 INJECTION, SOLUTION INTRAMUSCULAR; INTRAVENOUS
Status: DISCONTINUED | OUTPATIENT
Start: 2018-08-30 | End: 2018-08-30 | Stop reason: HOSPADM

## 2018-08-30 RX ORDER — DEXTROSE MONOHYDRATE, SODIUM CHLORIDE, AND POTASSIUM CHLORIDE 50; 1.49; 4.5 G/1000ML; G/1000ML; G/1000ML
INJECTION, SOLUTION INTRAVENOUS CONTINUOUS
Status: DISCONTINUED | OUTPATIENT
Start: 2018-08-30 | End: 2018-09-01

## 2018-08-30 RX ORDER — GLYCOPYRROLATE 0.2 MG/ML
INJECTION, SOLUTION INTRAMUSCULAR; INTRAVENOUS PRN
Status: DISCONTINUED | OUTPATIENT
Start: 2018-08-30 | End: 2018-08-30

## 2018-08-30 RX ADMIN — NEOSTIGMINE METHYLSULFATE 3 MG: 1 INJECTION, SOLUTION INTRAVENOUS at 17:04

## 2018-08-30 RX ADMIN — CEFAZOLIN SODIUM 2 G: 2 INJECTION, SOLUTION INTRAVENOUS at 15:30

## 2018-08-30 RX ADMIN — Medication 0.2 MG: at 20:29

## 2018-08-30 RX ADMIN — SODIUM CHLORIDE, POTASSIUM CHLORIDE, SODIUM LACTATE AND CALCIUM CHLORIDE: 600; 310; 30; 20 INJECTION, SOLUTION INTRAVENOUS at 16:55

## 2018-08-30 RX ADMIN — PHENYLEPHRINE HYDROCHLORIDE 150 MCG: 10 INJECTION, SOLUTION INTRAMUSCULAR; INTRAVENOUS; SUBCUTANEOUS at 16:03

## 2018-08-30 RX ADMIN — OXYCODONE HYDROCHLORIDE 5 MG: 5 TABLET ORAL at 21:31

## 2018-08-30 RX ADMIN — PHENYLEPHRINE HYDROCHLORIDE 50 MCG: 10 INJECTION, SOLUTION INTRAMUSCULAR; INTRAVENOUS; SUBCUTANEOUS at 16:05

## 2018-08-30 RX ADMIN — ONDANSETRON 4 MG: 2 INJECTION INTRAMUSCULAR; INTRAVENOUS at 17:05

## 2018-08-30 RX ADMIN — POTASSIUM CHLORIDE, DEXTROSE MONOHYDRATE AND SODIUM CHLORIDE: 150; 5; 450 INJECTION, SOLUTION INTRAVENOUS at 20:13

## 2018-08-30 RX ADMIN — PHENYLEPHRINE HYDROCHLORIDE 100 MCG: 10 INJECTION, SOLUTION INTRAMUSCULAR; INTRAVENOUS; SUBCUTANEOUS at 15:36

## 2018-08-30 RX ADMIN — PROPOFOL 60 MG: 10 INJECTION, EMULSION INTRAVENOUS at 15:15

## 2018-08-30 RX ADMIN — PHENYLEPHRINE HYDROCHLORIDE 0.25 MCG/KG/MIN: 10 INJECTION, SOLUTION INTRAMUSCULAR; INTRAVENOUS; SUBCUTANEOUS at 15:56

## 2018-08-30 RX ADMIN — CEFAZOLIN SODIUM 1 G: 2 INJECTION, SOLUTION INTRAVENOUS at 17:21

## 2018-08-30 RX ADMIN — PHENYLEPHRINE HYDROCHLORIDE 100 MCG: 10 INJECTION, SOLUTION INTRAMUSCULAR; INTRAVENOUS; SUBCUTANEOUS at 15:56

## 2018-08-30 RX ADMIN — PHENYLEPHRINE HYDROCHLORIDE 100 MCG: 10 INJECTION, SOLUTION INTRAMUSCULAR; INTRAVENOUS; SUBCUTANEOUS at 16:24

## 2018-08-30 RX ADMIN — ACETAMINOPHEN 975 MG: 325 TABLET, FILM COATED ORAL at 21:31

## 2018-08-30 RX ADMIN — FENTANYL CITRATE 50 MCG: 50 INJECTION INTRAMUSCULAR; INTRAVENOUS at 13:35

## 2018-08-30 RX ADMIN — Medication 0.12 MG: at 20:22

## 2018-08-30 RX ADMIN — MIDAZOLAM HYDROCHLORIDE 1 MG: 1 INJECTION, SOLUTION INTRAMUSCULAR; INTRAVENOUS at 13:35

## 2018-08-30 RX ADMIN — LIDOCAINE HYDROCHLORIDE 40 MG: 20 INJECTION, SOLUTION INFILTRATION; PERINEURAL at 15:15

## 2018-08-30 RX ADMIN — GLYCOPYRROLATE 0.4 MG: 0.2 INJECTION, SOLUTION INTRAMUSCULAR; INTRAVENOUS at 17:04

## 2018-08-30 RX ADMIN — ROCURONIUM BROMIDE 50 MG: 10 INJECTION INTRAVENOUS at 15:15

## 2018-08-30 RX ADMIN — SODIUM CHLORIDE, POTASSIUM CHLORIDE, SODIUM LACTATE AND CALCIUM CHLORIDE: 600; 310; 30; 20 INJECTION, SOLUTION INTRAVENOUS at 12:57

## 2018-08-30 RX ADMIN — PHENYLEPHRINE HYDROCHLORIDE 150 MCG: 10 INJECTION, SOLUTION INTRAMUSCULAR; INTRAVENOUS; SUBCUTANEOUS at 16:19

## 2018-08-30 RX ADMIN — DEXAMETHASONE SODIUM PHOSPHATE 4 MG: 4 INJECTION, SOLUTION INTRA-ARTICULAR; INTRALESIONAL; INTRAMUSCULAR; INTRAVENOUS; SOFT TISSUE at 15:20

## 2018-08-30 RX ADMIN — PROPOFOL 20 MG: 10 INJECTION, EMULSION INTRAVENOUS at 17:13

## 2018-08-30 RX ADMIN — PHENYLEPHRINE HYDROCHLORIDE 100 MCG: 10 INJECTION, SOLUTION INTRAMUSCULAR; INTRAVENOUS; SUBCUTANEOUS at 16:51

## 2018-08-30 RX ADMIN — PHENYLEPHRINE HYDROCHLORIDE 100 MCG: 10 INJECTION, SOLUTION INTRAMUSCULAR; INTRAVENOUS; SUBCUTANEOUS at 15:49

## 2018-08-30 RX ADMIN — FENTANYL CITRATE 50 MCG: 50 INJECTION INTRAMUSCULAR; INTRAVENOUS at 15:00

## 2018-08-30 RX ADMIN — MIDAZOLAM HYDROCHLORIDE 1 MG: 1 INJECTION, SOLUTION INTRAMUSCULAR; INTRAVENOUS at 15:00

## 2018-08-30 ASSESSMENT — COPD QUESTIONNAIRES: COPD: 0

## 2018-08-30 ASSESSMENT — ACTIVITIES OF DAILY LIVING (ADL): ADLS_ACUITY_SCORE: 10

## 2018-08-30 ASSESSMENT — LIFESTYLE VARIABLES: TOBACCO_USE: 0

## 2018-08-30 NOTE — PROGRESS NOTES
Admission medication history interview status for the 8/30/2018  admission is complete. See EPIC admission navigator for prior to admission medications     Medication history source reliability:Good    Medication history interview source(s):Patient    Medication history resources (including written lists, pill bottles, clinic record):Patient phoned in her medication list prior to surgery    Primary pharmacy.[facility]    Additional medication history information not noted on PTA med list :None    Time spent in this activity: 40 minutes    Prior to Admission medications    Medication Sig Last Dose Taking? Auth Provider   Acetaminophen (TYLENOL PO) Take 500-1,000 mg by mouth 3 times daily as needed for mild pain or fever 8/30/2018 at 0530 Yes Reported, Patient   ALPRAZolam (XANAX PO) Take 0.125-0.25 mg by mouth daily as needed for anxiety More than a year at PRN Yes Reported, Patient   aspirin-acetaminophen-caffeine (EXCEDRIN EXTRA STRENGTH) 250-250-65 MG per tablet Take 1 tablet by mouth daily 8/27/2018 at AM Yes Reported, Patient   DULoxetine HCl (CYMBALTA PO) Take 80 mg by mouth daily (Takes 1x60 + 1x20mg = 80mg dose) 8/30/2018 at 0530 Yes Reported, Patient   Omeprazole (PRILOSEC PO) Take 20 mg by mouth every other day 8/30/2018 at 0530 Yes Reported, Patient   OXYCODONE HCL PO Take 5 mg by mouth 3 times daily as needed  8/29/2018 at HS Yes Reported, Patient   polyethylene glycol (MIRALAX/GLYCOLAX) powder Take 0.25 capfuls by mouth daily (2 teaspoonful) Past Week at PRN Yes Reported, Patient   polyethylene glycol 0.4%- propylene glycol 0.3% (SYSTANE ULTRA) 0.4-0.3 % SOLN ophthalmic solution Place 1 drop into both eyes daily as needed for dry eyes Past Month at PRN Yes Reported, Patient   SIMVASTATIN PO Take 40 mg by mouth daily 8/30/2018 at 0530 Yes Reported, Patient   oxyCODONE-acetaminophen (PERCOCET) 5-325 MG per tablet Take 1 tablet by mouth every 6 hours as needed for pain 8/28/2018 at HS  Reported, Patient

## 2018-08-30 NOTE — ANESTHESIA POSTPROCEDURE EVALUATION
Patient: Elzbieta Ivan    Procedure(s):  LEFT REVERSE TOTAL SHOULDER ARTHROPLASTY - Wound Class: I-Clean    Diagnosis:FRACTURE LEFT SHOULDER   Diagnosis Additional Information: No value filed.    Anesthesia Type:  General, ETT, Periph. Nerve Block for postop pain    Note:  Anesthesia Post Evaluation    Patient location during evaluation: PACU  Patient participation: Able to fully participate in evaluation  Level of consciousness: awake  Pain management: adequate  Airway patency: patent  Cardiovascular status: acceptable  Respiratory status: acceptable  Hydration status: acceptable  PONV: none             Last vitals:  Vitals:    08/30/18 1740 08/30/18 1750 08/30/18 1800   BP: 136/67 128/61 119/61   Resp: 24 20 21   Temp:   36.6  C (97.9  F)   SpO2: 99% 100% 92%         Electronically Signed By: John More MD  August 30, 2018  6:12 PM

## 2018-08-30 NOTE — IP AVS SNAPSHOT
[address] [facility]    [address] [place] [address]    Phone:  [phone]                                       After Visit Summary   8/30/2018    Elzbieta Ivan    MRN: [id number]           After Visit Summary Signature Page     I have received my discharge instructions, and my questions have been answered. I have discussed any challenges I see with this plan with the nurse or doctor.    ..........................................................................................................................................  Patient/Patient Representative Signature      ..........................................................................................................................................  Patient Representative Print Name and Relationship to Patient    ..................................................               ................................................  Date                                            Time    ..........................................................................................................................................  Reviewed by Signature/Title    ...................................................              ..............................................  Date                                                            Time          22EPIC Rev 08/18

## 2018-08-30 NOTE — ANESTHESIA PREPROCEDURE EVALUATION
Anesthesia Evaluation     . Pt has had prior anesthetic. Type: General    No history of anesthetic complications          ROS/MED HX    ENT/Pulmonary:     (+)sleep apnea, , . .   (-) tobacco use, asthma and COPD   Neurologic:     (+)migraines,     Cardiovascular:     (+) Dyslipidemia, ----. : . . . :. .      (-) CAD   METS/Exercise Tolerance:     Hematologic:         Musculoskeletal: Comment: fibromyalgia  (+) arthritis, , , -       GI/Hepatic: Comment: dysphagia    (+) GERD      (-) liver disease   Renal/Genitourinary:      (-) renal disease   Endo:         Psychiatric:     (+) psychiatric history anxiety and depression      Infectious Disease:         Malignancy:         Other:                     Physical Exam  Normal systems: cardiovascular and pulmonary    Airway   Mallampati: II  TM distance: >3 FB  Neck ROM: full    Dental   (+) upper dentures    Cardiovascular       Pulmonary                     Anesthesia Plan      History & Physical Review  History and physical reviewed and following examination; no interval change.    ASA Status:  3 .    NPO Status:  > 8 hours    Plan for General, ETT and Periph. Nerve Block for postop pain with Intravenous induction. Maintenance will be Balanced.    PONV prophylaxis:  Ondansetron (or other 5HT-3) and Dexamethasone or Solumedrol       Postoperative Care  Postoperative pain management:  Multi-modal analgesia and Peripheral nerve block (Single Shot).      Consents  Anesthetic plan, risks, benefits and alternatives discussed with:  Patient..                          .

## 2018-08-30 NOTE — OP NOTE
PATIENT NAME:  Elzbieta Ivan  MEDICAL RECORD #: [id number]  PATIENT BIRTHDAY:           1940  DATE OF SURGERY: 8/30/2018    SURGEON:     Con [address] MD     1st ASSISTANTt:  YAHAIRA eVntura OPA-C      PREOPERATIVE DIAGNOSIS:  Displaced complex proximal humerus fracture left shoulder.      POSTOPERATIVE DIAGNOSIS:  Displaced complex proximal humerus fracture  left shoulder.      PROCEDURE: left reverse total shoulder arthroplasty.    COMPONENTS USED:  Tornier 9 mm humeral stem, 9 mm metaphyseal extension insert, 9 mm humeral poly insert, 25 mm glenoid baseplate, 36 mm glenosphere, plus appropriate length compression and locking screws.     INDICATIONS FOR PROCEDURE:  The patient is brought to the operating room for elective left Reverse TSA for a displaced complex proximal left humerus fracture.  Preoperative IV antibiotics were given and will be continued for 24 hours post-op.  The patient understands the indications, alternatives, risks, benefits, and time involved for recovery and is consented for the procedure.        DESCRIPTION OF PROCEDURE:  The patient was brought to the operating room on 8/30/2018 and following suitable general anesthesia, the patient was placed in the semi-sitting position and the left shoulder prepped and draped in the usual manner.      A full time out was carried out and the patient, proper extremity, operative site and procedure identified and confirmed by all members of the operative team.      We used the superolateral approach with an incision made over the distal clavicle, the anterior edge of the acromion, out to the anterolateral edge of the acromion and down in line with the deltoid fibers for 5 cm.  The deltoid was split in line with its fibers for 4 cm and released from the anterior edge of the acromion and clavicle.  The comminuted proximal humeral fracture fragments were excised by releasing the rotator cuff attachments.       We prepared the canal with the  diaphyseal reamer first to the 9 mm size.r.  We then trialed the 9 mm humeral component and noted the proper height for subsequent cement fixation.      We next retracted the humerus inferiorly.  Once we had good exposure of the glenoid, we then prepared the glenoid face by placing a centering hole followed by planing off the face of the glenoid with the circular reamer back to subchondral bone.  The 25 mm base plate was secured to the glenoid with two locking screws and 2 compression screws.  Secure fixation was obtained.  The 36 mm glenosphere was next secured to baseplate.      Attention was directed back to the humerus.  We trialed and determined that the 9 mm polyethylene articular insert would be used.   A single 9 mm metaphyseal ring extension was used.  The trial components were removed.  We prepared the bone surfaces with jet lavage and careful drying technique.  We then cemented the 9 mm stem into position using Simplex HD cement.  Once the cement was set, we trialed one more time and again selected the 9 mm articular insert with the metaphyseal ring extension which was secured and the shoulder reduced one final time.  Motion, stability and alignment was good.  Tensioning of the deltoid was good.      The wound was irrigated throughout with antibiotic solution using jet lavage.  It was closed over a medium Hemovac drain by initially placing 0 Vicryl in a running fashion to reinforce the edge of the anterior deltoid.  The running suture was placed 1 cm from the edge of the deltoid.  I then used #2 FiberWire looping around the 0 Vicryl reinforcing suture in the deltoid and then up through drill holes in the distal clavicle and acromion.  After these 4 sutures were placed through the bone and through the anterior deltoid, we then used #1 Ethibond in interrupted and running fashion over the top of the superior portion of the deltoid repair and then also running in the distal portion.  Subcutaneous tissue was  closed with 2-0 undyed Vicryl and skin with skin staples.  Sterile soft dressing was applied.  The patient was placed in an UltraSling, awakened and taken to PAR in satisfactory condition.     A surgical assistant was medically necessary and required during this procedure for positioning and retraction.  He was present for the entire procedure.          NAEEM DONALD MD      CC: Naeem Donald MD          Fax: [phone]

## 2018-08-30 NOTE — IP AVS SNAPSHOT
MRN:[id number]                      After Visit Summary   8/30/2018    Elzbieta Ivan    MRN: [id number]           Thank you!     Thank you for choosing [facility] for your care. Our goal is always to provide you with excellent care. Hearing back from our patients is one way we can continue to improve our services. Please take a few minutes to complete the written survey that you may receive in the mail after you visit with us. Thank you!        Patient Information     Date Of Birth          1940        Designated Caregiver       Most Recent Value    Caregiver    Will someone help with your care after discharge? yes    Name of designated caregiver Hoa    Phone number of caregiver [phone]    Caregiver address [place]      About your hospital stay     You were admitted on:  August 30, 2018 You last received care in the:  [facility] [address] [facility]    You were discharged on:  September 1, 2018        Reason for your hospital stay       Reverse TSA                  Who to Call     For medical emergencies, please call 911.  For non-urgent questions about your medical care, please call your primary care provider or clinic, [phone]  For questions related to your surgery, please call your surgery clinic        Attending Provider     Provider Naeem Kitchen MD Orthopedics       Primary Care Provider Office Phone # Fax #    Brigid Santiago [phone]      Follow-up Appointments     Follow-up and recommended labs and tests        Office visit prearranged in 10-14 days                  Further instructions from your care team       DISCHARGE INSTRUCTIONS FOR YOUR TOTAL SHOULDER REPLACEMENT      NAEEM DONALD MD    Instructions to care for your wound at home:   Change the dressing daily.  Inspect your incision at the time of dressing change for redness, swelling, and drainage.  Some discoloration and bruising is common, but please notify my  office if you have any concerns about the wound appearance.  You may shower directly over the wound beginning 4 days following your surgery.  Do not, however, submerge the wound under water until the sutures are removed and the wound completely sealed and without drainage.  You should let your arm hang at your side during the shower.  Do not actively lift or actively move the arm from your side when out of the sling.  To wash under your arm, simply lean over towards your operative shoulder to allow the arm to hang freely and gently separate from your body by gravity alone.         Assistive devices:  Sling for support.  Remove the sling at least 3 times daily to fully straighten your elbow.  Also move your wrist, hand, and fingers often to mobilize swelling and prevent stiffness.    Wearing a button-up blouse or shirt is recommended.  The shoulder sling or immobilizer may be worn over the outside of your clothes.  Always keep your surgical arm hanging at your side when donning or doffing the sling.  Slide your surgical arm into the shirt sleeve first, then the other arm.  When taking off the shirt or blouse, do the opposite - slide your unaffected arm out of the sleeve first, followed by the surgical arm.     Outpatient physical therapy and home exercises:  Your outpatient physical therapy following your surgery will be arranged by our office at your first post op appointment.      Clinic follow-up appointment:  Your clinic follow-up visit has been prearranged at the time of your surgery scheduling.     Medications:  New discharge medications will include a narcotic pain medication and a muscle relaxant as well as Tylenol 1000 mg three times a day.  While taking the narcotic you should continue to use  a stool softener while on the narcotic pain medication. Detailed instructions will come with those medications.  You will also receive instructions on when to resume your home medications.    Please use Aspirin 325  "mg, 1 tablet daily for the next 6 weeks to prevent any blood clots from forming.    Antibiotic coverage will be needed before any type of dental procedure.  This is a life long recommendation.  You should notify your dentist of your total shoulder surgery and call your dentist or our office one week before a dental appointment for the antibiotics.       Call [phone] with any questions.     Con Tucker MD    Pending Results     No orders found from 2018 to 2018.            Statement of Approval     Ordered          18 1025  I have reviewed and agree with all the recommendations and orders detailed in this document.  EFFECTIVE NOW     Approved and electronically signed by:  Con Tucker MD             Admission Information     Date & Time Provider Department Dept. Phone    2018 Con Tucker MD [facility] [address] [facility] [phone]      Your Vitals Were     Blood Pressure Pulse Temperature Respirations Height Weight    148/77 (BP Location: Right arm) 96 99  F (37.2  C) (Oral) 14 1.549 m (5' 1\") 63.8 kg (140 lb 10.5 oz)    Pulse Oximetry BMI (Body Mass Index)                95% 26.58 kg/m2          [employer] Information     [employer] lets you send messages to your doctor, view your test results, renew your prescriptions, schedule appointments and more. To sign up, go to [email or web address] . Click on \"Log in\" on the left side of the screen, which will take you to the Welcome page. Then click on \"Sign up Now\" on the right side of the page.     You will be asked to enter the access code listed below, as well as some personal information. Please follow the directions to create your username and password.     Your access code is: [email or web address]  Expires: 2018  9:02 AM     Your access code will  in 90 days. If you need help or a new code, please call your [facility] clinic or [phone].        Care EveryWhere ID     This is your Care EveryWhere " ID. This could be used by other organizations to access your [facility] medical records  [id number]        Equal Access to Services     EBNNY LOJA : Maya Zendejas, waamandapriscilla streetkvngha, stephanieoctavio henrykobepriscilla greenbergsaúlpriscilla, marlee wilksmaximjackson luciano. So [facility] [phone].    ATENCIÓN: Si habla español, tiene a [place] disposición servicios gratuitos de asistencia lingüística. Jerodame al [phone].    We comply with applicable federal civil rights laws and [place] laws. We do not discriminate on the basis of race, color, national origin, age, disability, sex, sexual orientation, or gender identity.               Review of your medicines      START taking        Dose / Directions    methocarbamol 500 MG tablet   Commonly known as:  ROBAXIN        Dose:  500 mg   Take 1 tablet (500 mg) by mouth 4 times daily as needed for muscle spasms   Quantity:  20 tablet   Refills:  0         CONTINUE these medicines which may have CHANGED, or have new prescriptions. If we are uncertain of the size of tablets/capsules you have at home, strength may be listed as something that might have changed.        Dose / Directions    oxyCODONE IR 5 MG tablet   Commonly known as:  ROXICODONE   This may have changed:    - medication strength  - how much to take  - when to take this  - reasons to take this        Dose:  5-10 mg   Take 1-2 tablets (5-10 mg) by mouth every 3 hours as needed for severe pain or other (pain control or improvement in physical function. Hold dose for analgesic side effects.)   Quantity:  50 tablet   Refills:  0         CONTINUE these medicines which have NOT CHANGED        Dose / Directions    CYMBALTA PO        Dose:  80 mg   Take 80 mg by mouth daily (Takes 1x60 + 1x20mg = 80mg dose)   Refills:  0       EXCEDRIN EXTRA STRENGTH 250-250-65 MG per tablet   Generic drug:  aspirin-acetaminophen-caffeine        Dose:  1 tablet   Take 1 tablet by mouth daily   Refills:  0       PERCOCET 5-325 MG per tablet    Generic drug:  oxyCODONE-acetaminophen        Dose:  1 tablet   Take 1 tablet by mouth every 6 hours as needed for pain   Refills:  0       polyethylene glycol 0.4%- propylene glycol 0.3% 0.4-0.3 % Soln ophthalmic solution   Commonly known as:  SYSTANE ULTRA        Dose:  1 drop   Place 1 drop into both eyes daily as needed for dry eyes   Refills:  0       polyethylene glycol powder   Commonly known as:  MIRALAX/GLYCOLAX        Dose:  0.25 capful   Take 0.25 capfuls by mouth daily (2 teaspoonful)   Refills:  0       PRILOSEC PO        Dose:  20 mg   Take 20 mg by mouth every other day   Refills:  0       SIMVASTATIN PO        Dose:  40 mg   Take 40 mg by mouth daily   Refills:  0       TYLENOL PO        Dose:  500-1000 mg   Take 500-1,000 mg by mouth 3 times daily as needed for mild pain or fever   Refills:  0       XANAX PO        Dose:  0.125-0.25 mg   Take 0.125-0.25 mg by mouth daily as needed for anxiety   Refills:  0            Where to get your medicines      These medications were sent to [facility] [place], [place] - [address]  [address] [facility] [address], [place] [address]     Phone:  [phone]     methocarbamol 500 MG tablet         Some of these will need a paper prescription and others can be bought over the counter. Ask your nurse if you have questions.     Bring a paper prescription for each of these medications     oxyCODONE IR 5 MG tablet                Protect others around you: Learn how to safely use, store and throw away your medicines at www.disposemymeds.org.        Information about OPIOIDS     PRESCRIPTION OPIOIDS: WHAT YOU NEED TO KNOW   We gave you an opioid (narcotic) pain medicine. It is important to manage your pain, but opioids are not always the best choice. You should first try all the other options your care team gave you. Take this medicine for as short a time (and as few doses) as possible.    Some activities can increase your pain, such as bandage changes or  therapy sessions. It may help to take your pain medicine 30 to 60 minutes before these activities. Reduce your stress by getting enough sleep, working on hobbies you enjoy and practicing relaxation or meditation. Talk to your care team about ways to manage your pain beyond prescription opioids.    These medicines have risks:    DO NOT drive when on new or higher doses of pain medicine. These medicines can affect your alertness and reaction times, and you could be arrested for driving under the influence (DUI). If you need to use opioids long-term, talk to your care team about driving.    DO NOT operate heavy machinery    DO NOT do any other dangerous activities while taking these medicines.    DO NOT drink any alcohol while taking these medicines.     If the opioid prescribed includes acetaminophen, DO NOT take with any other medicines that contain acetaminophen. Read all labels carefully. Look for the word  acetaminophen  or  Tylenol.  Ask your pharmacist if you have questions or are unsure.    You can get addicted to pain medicines, especially if you have a history of addiction (chemical, alcohol or substance dependence). Talk to your care team about ways to reduce this risk.    All opioids tend to cause constipation. Drink plenty of water and eat foods that have a lot of fiber, such as fruits, vegetables, prune juice, apple juice and high-fiber cereal. Take a laxative (Miralax, milk of magnesia, Colace, Senna) if you don t move your bowels at least every other day. Other side effects include upset stomach, sleepiness, dizziness, throwing up, tolerance (needing more of the medicine to have the same effect), physical dependence and slowed breathing.    Store your pills in a secure place, locked if possible. We will not replace any lost or stolen medicine. If you don t finish your medicine, please throw away (dispose) as directed by your pharmacist. The [place] Pollution Control Agency has more information about  safe disposal: https://www.pca.[facility].[place].us/living-green/managing-unwanted-medications             Medication List: This is a list of all your medications and when to take them. Check marks below indicate your daily home schedule. Keep this list as a reference.      Medications           Morning Afternoon Evening Bedtime As Needed    CYMBALTA PO   Take 80 mg by mouth daily (Takes 1x60 + 1x20mg = 80mg dose)   Last time this was given:  80 mg on 9/1/2018  7:50 AM   Next Dose Due:  9/2/18                                    EXCEDRIN EXTRA STRENGTH 250-250-65 MG per tablet   Take 1 tablet by mouth daily   Generic drug:  aspirin-acetaminophen-caffeine                                methocarbamol 500 MG tablet   Commonly known as:  ROBAXIN   Take 1 tablet (500 mg) by mouth 4 times daily as needed for muscle spasms   Last time this was given:  500 mg on 8/31/2018  5:01 PM                            Take as needed        oxyCODONE IR 5 MG tablet   Commonly known as:  ROXICODONE   Take 1-2 tablets (5-10 mg) by mouth every 3 hours as needed for severe pain or other (pain control or improvement in physical function. Hold dose for analgesic side effects.)   Last time this was given:  5 mg on 9/1/2018  9:39 AM                                   PERCOCET 5-325 MG per tablet   Take 1 tablet by mouth every 6 hours as needed for pain   Generic drug:  oxyCODONE-acetaminophen                                polyethylene glycol 0.4%- propylene glycol 0.3% 0.4-0.3 % Soln ophthalmic solution   Commonly known as:  SYSTANE ULTRA   Place 1 drop into both eyes daily as needed for dry eyes                                   polyethylene glycol powder   Commonly known as:  MIRALAX/GLYCOLAX   Take 0.25 capfuls by mouth daily (2 teaspoonful)                                      PRILOSEC PO   Take 20 mg by mouth every other day   Last time this was given:  20 mg on 9/1/2018  6:14 AM   Next Dose Due:  9/3/18                                    SIMVASTATIN PO   Take 40 mg by mouth daily   Last time this was given:  40 mg on 8/31/2018  9:38 PM                                   TYLENOL PO   Take 500-1,000 mg by mouth 3 times daily as needed for mild pain or fever   Last time this was given:  975 mg on 9/1/2018  6:10 AM                                   XANAX PO   Take 0.125-0.25 mg by mouth daily as needed for anxiety

## 2018-08-30 NOTE — ANESTHESIA CARE TRANSFER NOTE
Patient: Elzbieta Ivan    Procedure(s):  LEFT REVERSE TOTAL SHOULDER ARTHROPLASTY - Wound Class: I-Clean    Diagnosis: FRACTURE LEFT SHOULDER   Diagnosis Additional Information: No value filed.    Anesthesia Type:   General, ETT, Periph. Nerve Block for postop pain     Note:  Airway :Face Mask  Patient transferred to:PACU  Handoff Report: Identifed the Patient, Identified the Reponsible Provider, Reviewed the pertinent medical history, Discussed the surgical course, Reviewed Intra-OP anesthesia mangement and issues during anesthesia, Set expectations for post-procedure period and Allowed opportunity for questions and acknowledgement of understanding      Vitals: (Last set prior to Anesthesia Care Transfer)    CRNA VITALS  8/30/2018 1702 - 8/30/2018 1737      8/30/2018             Pulse: 87    SpO2: 98 %    Resp Rate (observed): 8    Resp Rate (set): 10                Electronically Signed By: ENID Huertas CRNA  August 30, 2018  5:37 PM

## 2018-08-31 ENCOUNTER — APPOINTMENT (OUTPATIENT)
Dept: OCCUPATIONAL THERAPY | Facility: CLINIC | Age: 78
DRG: 483 | End: 2018-08-31
Attending: ORTHOPAEDIC SURGERY
Payer: MEDICARE

## 2018-08-31 LAB — HGB BLD-MCNC: 9.8 G/DL (ref 11.7–15.7)

## 2018-08-31 PROCEDURE — 25000132 ZZH RX MED GY IP 250 OP 250 PS 637: Mod: GY | Performed by: ORTHOPAEDIC SURGERY

## 2018-08-31 PROCEDURE — 12000007 ZZH R&B INTERMEDIATE

## 2018-08-31 PROCEDURE — 25000128 H RX IP 250 OP 636: Performed by: ORTHOPAEDIC SURGERY

## 2018-08-31 PROCEDURE — 97166 OT EVAL MOD COMPLEX 45 MIN: CPT | Mod: GO

## 2018-08-31 PROCEDURE — 97110 THERAPEUTIC EXERCISES: CPT | Mod: GO

## 2018-08-31 PROCEDURE — 36415 COLL VENOUS BLD VENIPUNCTURE: CPT | Performed by: ORTHOPAEDIC SURGERY

## 2018-08-31 PROCEDURE — 97535 SELF CARE MNGMENT TRAINING: CPT | Mod: GO

## 2018-08-31 PROCEDURE — 99232 SBSQ HOSP IP/OBS MODERATE 35: CPT | Performed by: INTERNAL MEDICINE

## 2018-08-31 PROCEDURE — A9270 NON-COVERED ITEM OR SERVICE: HCPCS | Mod: GY | Performed by: ORTHOPAEDIC SURGERY

## 2018-08-31 PROCEDURE — 40000133 ZZH STATISTIC OT WARD VISIT

## 2018-08-31 PROCEDURE — 97530 THERAPEUTIC ACTIVITIES: CPT | Mod: GO

## 2018-08-31 PROCEDURE — 85018 HEMOGLOBIN: CPT | Performed by: ORTHOPAEDIC SURGERY

## 2018-08-31 RX ORDER — OXYCODONE HYDROCHLORIDE 5 MG/1
5-10 TABLET ORAL
Qty: 50 TABLET | Refills: 0 | Status: ON HOLD | OUTPATIENT
Start: 2018-08-31 | End: 2020-02-04

## 2018-08-31 RX ORDER — METHOCARBAMOL 500 MG/1
500 TABLET, FILM COATED ORAL 4 TIMES DAILY PRN
Qty: 20 TABLET | Refills: 0 | Status: ON HOLD | OUTPATIENT
Start: 2018-08-31 | End: 2020-02-04

## 2018-08-31 RX ORDER — HYDROMORPHONE HYDROCHLORIDE 1 MG/ML
.2-.5 INJECTION, SOLUTION INTRAMUSCULAR; INTRAVENOUS; SUBCUTANEOUS
Status: DISCONTINUED | OUTPATIENT
Start: 2018-08-31 | End: 2018-09-01 | Stop reason: HOSPADM

## 2018-08-31 RX ORDER — KETOROLAC TROMETHAMINE 15 MG/ML
15 INJECTION, SOLUTION INTRAMUSCULAR; INTRAVENOUS ONCE
Status: COMPLETED | OUTPATIENT
Start: 2018-08-31 | End: 2018-08-31

## 2018-08-31 RX ADMIN — METHOCARBAMOL 500 MG: 500 TABLET ORAL at 10:50

## 2018-08-31 RX ADMIN — ACETAMINOPHEN 975 MG: 325 TABLET, FILM COATED ORAL at 21:38

## 2018-08-31 RX ADMIN — OXYCODONE HYDROCHLORIDE 5 MG: 5 TABLET ORAL at 09:15

## 2018-08-31 RX ADMIN — METHOCARBAMOL 500 MG: 500 TABLET ORAL at 17:01

## 2018-08-31 RX ADMIN — OXYCODONE HYDROCHLORIDE 5 MG: 5 TABLET ORAL at 01:40

## 2018-08-31 RX ADMIN — OXYCODONE HYDROCHLORIDE 5 MG: 5 TABLET ORAL at 18:27

## 2018-08-31 RX ADMIN — OXYCODONE HYDROCHLORIDE 5 MG: 5 TABLET ORAL at 12:20

## 2018-08-31 RX ADMIN — KETOROLAC TROMETHAMINE 15 MG: 15 INJECTION, SOLUTION INTRAMUSCULAR; INTRAVENOUS at 06:57

## 2018-08-31 RX ADMIN — Medication 0.2 MG: at 04:50

## 2018-08-31 RX ADMIN — CEFAZOLIN SODIUM 1 G: 1 INJECTION, POWDER, FOR SOLUTION INTRAMUSCULAR; INTRAVENOUS at 01:40

## 2018-08-31 RX ADMIN — CEFAZOLIN SODIUM 1 G: 1 INJECTION, POWDER, FOR SOLUTION INTRAMUSCULAR; INTRAVENOUS at 10:25

## 2018-08-31 RX ADMIN — METHOCARBAMOL 500 MG: 500 TABLET ORAL at 05:36

## 2018-08-31 RX ADMIN — SENNOSIDES AND DOCUSATE SODIUM 2 TABLET: 8.6; 5 TABLET ORAL at 08:12

## 2018-08-31 RX ADMIN — Medication 0.5 MG: at 05:52

## 2018-08-31 RX ADMIN — ACETAMINOPHEN 650 MG: 325 TABLET, FILM COATED ORAL at 01:40

## 2018-08-31 RX ADMIN — DULOXETINE HYDROCHLORIDE 80 MG: 20 CAPSULE, DELAYED RELEASE ORAL at 08:12

## 2018-08-31 RX ADMIN — OXYCODONE HYDROCHLORIDE 5 MG: 5 TABLET ORAL at 05:18

## 2018-08-31 RX ADMIN — SIMVASTATIN 40 MG: 40 TABLET, FILM COATED ORAL at 21:38

## 2018-08-31 RX ADMIN — OXYCODONE HYDROCHLORIDE 5 MG: 5 TABLET ORAL at 15:27

## 2018-08-31 RX ADMIN — OXYCODONE HYDROCHLORIDE 5 MG: 5 TABLET ORAL at 21:38

## 2018-08-31 RX ADMIN — ACETAMINOPHEN 975 MG: 325 TABLET, FILM COATED ORAL at 05:18

## 2018-08-31 RX ADMIN — Medication 0.5 MG: at 08:11

## 2018-08-31 RX ADMIN — ACETAMINOPHEN 975 MG: 325 TABLET, FILM COATED ORAL at 13:12

## 2018-08-31 ASSESSMENT — ACTIVITIES OF DAILY LIVING (ADL)
ADLS_ACUITY_SCORE: 10

## 2018-08-31 NOTE — PROVIDER NOTIFICATION
MD Notification    Notified Person: MD    Notified Person Name: on-call Hospitalist    Notification Date/Time: 8/31/18 05:05    Notification Interaction: Page out to physician    Purpose of Notification: uncontrolled pain; Nursing has exhausted all currently ordered pain medications.      Orders Received: awaiting discussion/plan    Comments: all questions/thoughts acknowledged and reassurance provided to patient

## 2018-08-31 NOTE — DISCHARGE SUMMARY
DISCHARGE SUMMARY    NAME:  Elzbieta Ivan  AGE:  77 year old  YOB: 1940  MRN#:  [id number]    Elzbieta Ivan was admitted for a reverse total shoulder arthroplasty for a complex and displaced proximal left humerus fracture.  The surgery was performed on 8/30/2018.  The postoperative course is documented in the medical record.  There were no complications. The patient was felt ready for discharge home with her sister with post-discharge physical therapy and outpatient visit prearranged.     Lab Results   Component Value Date    HGB 11.6 (L) 08/30/2018       The patient received 0 units transfusion.      FINAL DISCHARGE DIAGNOSIS:  Displaced, complex proximal left humerus fracture                    SURGICAL PROCEDURE THIS ADMISSION:  Left reverse total shoulder arthroplasty.         NAEEM DONALD MD      CC: Fax [phone]         Naeem Donald MD

## 2018-08-31 NOTE — CONSULTS
Consult Date:  2018      PRIMARY CARE PROVIDER:  Dr. Santiago.      REQUESTING PHYSICIAN:  Dr. Tucker.      REASON FOR CONSULTATION:  Medical management following a reverse left total shoulder arthroplasty.      HISTORY OF PRESENT ILLNESS:  Elzbieta Ivan is a 77-year-old female with a past medical history significant for paroxysmal SVT with palpitations, GERD, osteoarthritis, generalized anxiety disorder with major depression, obstructive sleep apnea, known carotid artery occlusion without CVA, history of nontoxic goiter, hyperlipidemia, and fibromyalgia who is being evaluated for medical management following a left total shoulder arthroplasty.      The patient had recently injured herself, fallen, resulting in 3 fractures of her shoulder and the patient came in to have this surgically repaired.  This was performed under general endotracheal anesthesia with an estimated blood loss of 300 mL and no complications.      The patient was evaluated in her hospital room.  She indicated that she occasionally has hot flashes, had a slight headache today and has a history of known migraines with auras.  She currently is without her hearing aids and does have palpitations on occasion.  Has had ongoing left shoulder pain since her recent fall with noted 3 fractures.  She has been experiencing some constipation and also some numbness and tingling in her left arm.      PAST MEDICAL HISTORY:   1.  History of SVT with palpitations.   2.  GERD.   3.  Osteoarthritis.   4.  Generalized anxiety disorder with depression.   5.  Obstructive sleep apnea for which the patient is not compliant with the CPAP.   6.  Known history of carotid artery occlusion without a stroke.   7.  History of nontoxic goiter.   8.  Hyperlipidemia.   9.  Fibromyalgia.      PAST SURGICAL HISTORY:   1.  Blepharoplasty bilaterally.   2.  .   3.  Appendectomy.   4.  Chalazion excision from the right eye.   5.  Hysterectomy with single oophorectomy.    6.  Left knee arthroscopy.   7.  Tonsillectomy.      PRIOR TO ADMIT MEDICATIONS:   Prescriptions Prior to Admission   Medication Sig Dispense Refill Last Dose     Acetaminophen (TYLENOL PO) Take 500-1,000 mg by mouth 3 times daily as needed for mild pain or fever   8/30/2018 at 0530     ALPRAZolam (XANAX PO) Take 0.125-0.25 mg by mouth daily as needed for anxiety   More than a year at PRN     aspirin-acetaminophen-caffeine (EXCEDRIN EXTRA STRENGTH) 250-250-65 MG per tablet Take 1 tablet by mouth daily   8/27/2018 at AM     DULoxetine HCl (CYMBALTA PO) Take 80 mg by mouth daily (Takes 1x60 + 1x20mg = 80mg dose)   8/30/2018 at 0530     Omeprazole (PRILOSEC PO) Take 20 mg by mouth every other day   8/30/2018 at 0530     OXYCODONE HCL PO Take 5 mg by mouth 3 times daily as needed    8/29/2018 at HS     polyethylene glycol (MIRALAX/GLYCOLAX) powder Take 0.25 capfuls by mouth daily (2 teaspoonful)   Past Week at PRN     polyethylene glycol 0.4%- propylene glycol 0.3% (SYSTANE ULTRA) 0.4-0.3 % SOLN ophthalmic solution Place 1 drop into both eyes daily as needed for dry eyes   Past Month at PRN     SIMVASTATIN PO Take 40 mg by mouth daily   8/30/2018 at 0530     oxyCODONE-acetaminophen (PERCOCET) 5-325 MG per tablet Take 1 tablet by mouth every 6 hours as needed for pain   8/28/2018 at HS        ALLERGIES:    Allergies   Allergen Reactions     Scopolamine Other (See Comments)     Hallucianations        SOCIAL HISTORY:  The patient currently resides in a [facility] in [place] independently with her dog.  She is a former tobacco user; she quit smoking approximately 15 years ago.  Denies alcohol use or street illicit drug use and does not currently utilize a cane or a walker.      FAMILY HISTORY:   1.  Mother had bladder cancer and hypertension.   2.  Father had rheumatoid arthritis and dementia.      REVIEW OF SYSTEMS:  A 10-point review of systems was performed.  All pertinent positives are listed in history of present  illness, otherwise is negative.      PHYSICAL EXAMINATION:   VITAL SIGNS:  Temperature is 99.5 degrees Fahrenheit with a blood pressure of 108/60, heart rate of 86 beats per minute, respiratory rate of 16, O2 saturation 95% on 3 liters per nasal cannula.  The patient was denying any pain during my assessment.   GENERAL:  The patient is awake, alert and cooperative, in no apparent distress, is hard of hearing.  Otherwise, alert and oriented x 3.   HEENT:  Normocephalic, atraumatic.  Moist mucous membranes present.  No exudates noted in the posterior pharynx.  Uvula is midline.  Eyes:  Pupils are equal, round, reactive to light.  Extraocular movements are intact.  Normal sclerae.   NECK:  Supple, normal range of motion, no tracheal deviation, no cervical lymphadenopathy present.   CARDIAC:  Regular rate and rhythm.  No rubs, murmurs or gallops appreciated.   PULMONARY:  Lungs are clear to auscultation bilaterally.  No wheezes, rhonchi or rales appreciated.   GASTROINTESTINAL:  Hypoactive bowel sounds, soft, nontender, nondistended.   NEUROLOGIC:  Cranial nerves II through XII are grossly intact.  Patient demonstrates equal sensation, coordination and strength in the lower extremities bilaterally and in the right upper extremity.  Left upper extremity was deferred as patient is currently in a sling.   EXTREMITIES:  No lower extremity edema noted bilaterally.  Calves are nontender to palpation.      ASSESSMENT AND PLAN:  Elzbieta Ivan is a 77-year-old female with a past medical history significant for paroxysmal supraventricular tachycardia, gastroesophageal reflux disease, osteoarthritis, generalized anxiety disorder with depression, obstructive sleep apnea, hyperlipidemia, fibromyalgia, nontoxic goiter and a history of carotid artery occlusion without cerebrovascular accident who is being evaluated for medical management following a reverse left total shoulder arthroplasty.   1.  Left shoulder fracture x 3, status  post left reverse total shoulder arthroplasty:  Orthopedic service is managing at this point.  Will defer analgesic management, DVT prophylaxis and PT and OT assessment to orthopedic service.  Hemoglobin will be checked in the morning to assess for acute blood loss anemia.  I would strongly encourage utilization of incentive spirometer and aggressive pulmonary hygiene.   2.  Generalized anxiety disorder with depression:  Patient is compliant with as-needed Xanax 0.125 mg to 0.25 mg daily as needed.  This medication will be resumed at this point and patient will also continue prior to admit Cymbalta 80 mg daily.   3.  Hyperlipidemia:  Will resume prior to admit simvastatin 40 mg daily.   4.  Gastroesophageal reflux disease:  Will resume prior to admit omeprazole 20 mg daily.   5.  History of paroxysmal supraventricular tachycardia:  Patient does not appear to be on any medications at this point.  No interventions appear necessary.   6.  Obstructive sleep apnea:  Upon discussion, patient is not utilizing a CPAP machine and is requesting that this is not ordered during this stay.   7.  Osteoarthritis with noted fibromyalgia:  Analgesic management will be deferred to orthopedic service.   8.  History of carotid artery occlusion without cerebrovascular accident:  This appears to be stable.  No further interventions appear necessary.   9.  Deep venous thrombosis prophylaxis:  Per orthopedic service, patient has been placed on PCDS.      PAIN ASSESSMENT:    # Pain Assessment:  Current Pain Score 8/30/2018   Patient currently in pain? sleeping: patient not able to self report   Pain score (0-10) -   Pain location -   Pain descriptors -   Pain secondary to post-operative state and managed by Ortho.         CODE STATUS:  The patient elected to be FULL CODE.      DISPOSITION:  Ultimately up to orthopedic service.      The patient was discussed with Dr. Ruiz Enriquez, who agrees with the assessment and plan as stated above.    Zoe will evaluate the patient independently.      At this time, I would like to thank Dr. Tucker for consulting the hospitalist service.  We will continue to follow.         MARTHA CRUZ MD       As dictated by AYSE LYNNE PA-C            D: 2018   T: 2018   MT: HANH      Name:     RAMON MESA   MRN:      [id number]        Account:       [id number]   :      1940           Consult Date:  2018      Document: [id number]       cc: Con Santiago MD

## 2018-08-31 NOTE — PLAN OF CARE
Problem: Patient Care Overview  Goal: Plan of Care/Patient Progress Review  Outcome: No Change  VSS stable on RA. Pain control once nerve block wore off proved to be a challenge. Primary bedside nurse (writer) collaborated with Charge nurse, Nurse manager during shift in an effort to re-establish pain control for the patient as quickly as possible. Please see other notes for further information. CMS remained intact throughout shift despite worsening/uncontrolled pain. At shift change (~07:30) Pt appeared to be resting MUCH more comfortably. Lab personnel was turned away by Pt early this morning when experiencing increased pain/anxiety (still needing to check CBC/BMP/Hgb). IVF infusing, receiving intermittent IV ABX. Tolerating regular diet w/no bread products. PCDs removed late this morning at the request of Pt due to complaints of discomfort. Pt educated on the rationale/importance of wearing as ordered to prevent blood clots. Pt verbalized understanding. Most recent Hgb 11.6

## 2018-08-31 NOTE — PLAN OF CARE
Problem: Patient Care Overview  Goal: Plan of Care/Patient Progress Review  Patient arrived to the floor at 1930. VSS, on 3 L oxygen. Alert and oriented x 4. Hard of hearing. Sling in place. hmvc output 25 ml. Dressing cdi, cms intact. Tolerated liquids.  Hx of twisted esophagus, head of the bed 90 degrees when eating/taking pills. No breads.     Bladder scan at 2245 for 307 ml. Patient does not feel need to void yet.

## 2018-08-31 NOTE — PROGRESS NOTES
[facility] Orthopedic Post-Op / Progress Note  Elzbieta Ivan is a 77 year old female    Today's Date:2018  Admission Date: 2018  POD # 1 Reverse TSA         Interval History:   Struggling with pain management.  Her block wore off at 4 AM this morning and pain has been rated as 10/10 since, perhaps reduced to 9/10 now.  Her sister is present and a huge help for this patient with high anxiety and some cognitive issues.  Wound good.  Drain removed.  X-Rays good.  I have set expectations for her to go home tomorrow with her sister.  Sister understands and agrees. Patient will need constant reassurance.                Physical Exam:   All vitals have been reviewed  Temperatures:  Current - Temp: 99.5  F (37.5  C); Max - Temp  Av.3  F (36.8  C)  Min: 97.1  F (36.2  C)  Max: 99.5  F (37.5  C)  Pulse range: Pulse  Av.5  Min: 91  Max: 106  Blood pressure range: Systolic (24hrs), Av , Min:100 , Max:144   ; Diastolic (24hrs), Av, Min:48, Max:87      Intake/Output Summary (Last 24 hours) at 18 1012  Last data filed at 18 0824   Gross per 24 hour   Intake             1240 ml   Output             1200 ml   Net               40 ml       Wound clean and dry with minimal or no drainage.  Surrounding skin with minimal erythema.  Drain removed.          Data:   All laboratory data related to this surgery reviewed      Lab Results   Component Value Date     2018    POTASSIUM 4.1 2018    CHLORIDE 105 2018    CO2 27 2018    GLC 86 2018     Lab Results   Component Value Date    HGB 11.6 (L) 2018     No results found for: PLT    All imaging studies related to this surgery reviewed         Assessment and Plan:    Assessment:  Clean wound without signs of infection.  No immediate surgical complications identified.  No excessive bleeding  Having pain tolerance issues and difficulty understanding reason for increased pain after block wore off.       Plan:  Pain control medication: Oxycodone, Tylenol, Toradol, Robaxin, Dilaudid IV for breakthrough pain  Mobilizing and constantly reassure  Home tomorrow with sister.    Con Tucker MD

## 2018-08-31 NOTE — DISCHARGE INSTRUCTIONS
DISCHARGE INSTRUCTIONS FOR YOUR TOTAL SHOULDER REPLACEMENT      NAEEM DONALD MD    Instructions to care for your wound at home:   Change the dressing daily.  Inspect your incision at the time of dressing change for redness, swelling, and drainage.  Some discoloration and bruising is common, but please notify my office if you have any concerns about the wound appearance.  You may shower directly over the wound beginning 4 days following your surgery.  Do not, however, submerge the wound under water until the sutures are removed and the wound completely sealed and without drainage.  You should let your arm hang at your side during the shower.  Do not actively lift or actively move the arm from your side when out of the sling.  To wash under your arm, simply lean over towards your operative shoulder to allow the arm to hang freely and gently separate from your body by gravity alone.         Assistive devices:  Sling for support.  Remove the sling at least 3 times daily to fully straighten your elbow.  Also move your wrist, hand, and fingers often to mobilize swelling and prevent stiffness.    Wearing a button-up blouse or shirt is recommended.  The shoulder sling or immobilizer may be worn over the outside of your clothes.  Always keep your surgical arm hanging at your side when donning or doffing the sling.  Slide your surgical arm into the shirt sleeve first, then the other arm.  When taking off the shirt or blouse, do the opposite - slide your unaffected arm out of the sleeve first, followed by the surgical arm.     Outpatient physical therapy and home exercises:  Your outpatient physical therapy following your surgery will be arranged by our office at your first post op appointment.      Clinic follow-up appointment:  Your clinic follow-up visit has been prearranged at the time of your surgery scheduling.     Medications:  New discharge medications will include a narcotic pain medication and a muscle relaxant as  well as Tylenol 1000 mg three times a day.  While taking the narcotic you should continue to use  a stool softener while on the narcotic pain medication. Detailed instructions will come with those medications.  You will also receive instructions on when to resume your home medications.    Please use Aspirin 325 mg, 1 tablet daily for the next 6 weeks to prevent any blood clots from forming.    Antibiotic coverage will be needed before any type of dental procedure.  This is a life long recommendation.  You should notify your dentist of your total shoulder surgery and call your dentist or our office one week before a dental appointment for the antibiotics.       Call [phone] with any questions.     Con Tucker MD

## 2018-08-31 NOTE — PROGRESS NOTES
[facility]    Hospitalist Progress Note    Assessment & Plan   Elzbieta Ivan is a 77 year old female with PMHx of paroxysmal SVT with palpitations, hyperlipidemia, hx of carotid artery occlusion w/o stroke, GIGI (noncompliant with CPAP), GERD, anxiety, depression, fibromyalgia and OA who underwent a reverse left total shoulder arthroplasty per Dr. MECHELLE Tucker on 8/30/2018 for management of shoulder fractures (x3) after a fall. Hospitalist service was consulted to assist with postop medical co-management.    S/p L Reverse Total Shoulder Arthorplasty on 8/30/18: POD #1  -- routine postop cares per ortho including pain control, DVT ppx and therapies  -- has sched Tylenol, prn oxycodone and prn IV dilaudid available for pain  -- has sched bowel regimen  -- AM hgb pending  -- PT/OT    Paroxysmal SVT with hx of palpitations  Hx of carotid artery occlusion w/o stroke  Not on medications. No concerns at this time    Hyperlipidemia:  Chronic and stable on statin    GERD:  Chronic and stable on PPI.    GIGI:  Does not use CPAP. Monitor for apnea with use of narcotics postop.     Anxiety / Depression:  Fibromyalgia  Chronic and stable on Cymbalta 80mg daily and Xanax (0.125- 0.25mg daily as needed)    Pain Assessment:  Current Pain Score 8/31/2018   Patient currently in pain? yes   Pain score (0-10) 10   Pain location Shoulder   Pain descriptors -   - Elzbieta is experiencing pain due to shoulder surgery. Pain management was discussed and the plan was created in a collaborative fashion.  Elzbieta's response to the current recommendations: compliant  - Please see the plan for pain management as documented above    FEN: cont IVFs (D5 1/2NS with KCl @75ml/h) until steadily taking po, lytes stable, regular diet ordered  DVT Prophylaxis: PCDs  Code Status: Full Code    Disposition: Discharge per ortho.     Gege Zuniga    Interval History   Seen this morning. Endorses ongoing pain which at times has  made her cry. Minimal appetite at this time dt ongoing pain but denies abd pain/n/v. No cp/sob/cough.    -Data reviewed today: I reviewed all new labs and imaging results over the last 24 hours. I personally reviewed no images or EKG's today.    Physical Exam   Temp: 99.5  F (37.5  C) Temp src: Oral BP: 144/78 Pulse: 91 Heart Rate: 83 Resp: 16 SpO2: 92 % O2 Device: Nasal cannula Oxygen Delivery: 2.5 LPM  Vitals:    08/30/18 1211   Weight: 63.8 kg (140 lb 10.5 oz)     Vital Signs with Ranges  Temp:  [97.1  F (36.2  C)-99.5  F (37.5  C)] 99.5  F (37.5  C)  Pulse:  [] 91  Heart Rate:  [78-95] 83  Resp:  [14-24] 16  BP: (100-144)/(48-87) 144/78  SpO2:  [92 %-100 %] 92 %  I/O last 3 completed shifts:  In: 1240 [P.O.:240; I.V.:1000]  Out: 850 [Urine:500; Drains:50; Blood:300]    Constitutional: Resting quietly in bed, alert and conversing appropriately, NAD  Respiratory: CTAB, no wheeze/rales/rhonchi  Cardiovascular: HRRR, no MGR, no LE edema  GI: S, NT, ND, +S  Skin/Integumen: warm/dry  Other:      Medications     dextrose 5% and 0.45% NaCl + KCl 20 mEq/L 75 mL/hr at 08/30/18 2320       acetaminophen  975 mg Oral Q8H     ceFAZolin  1 g Intravenous Q8H     DULoxetine (CYMBALTA) EC capsule 80 mg  80 mg Oral Daily     [START ON 9/1/2018] omeprazole (priLOSEC) CR capsule 20 mg  20 mg Oral Every Other Day     senna-docusate  1 tablet Oral BID    Or     senna-docusate  2 tablet Oral BID     simvastatin (ZOCOR) tablet 40 mg  40 mg Oral Daily     sodium chloride (PF)  3 mL Intracatheter Q8H       Data     Recent Labs  Lab 08/30/18  1236   HGB 11.6*   INR 0.96      POTASSIUM 4.1   CHLORIDE 105   CO2 27   BUN 17   CR 0.85   ANIONGAP 8   KALANI 8.7   GLC 86       Recent Results (from the past 24 hour(s))   XR Shoulder Left Port G/E 2 Views    Narrative    LEFT SHOULDER TWO VIEWS  8/30/2018 6:01 PM    HISTORY: Shoulder arthroplasty. Reverse TSA.    COMPARISON: None.    FINDINGS: There is shoulder arthroplasty in anatomic  alignment with  well-seated components.      Impression    IMPRESSION: Total shoulder arthroplasty in anatomic alignment.     NAEEM MAC MD

## 2018-08-31 NOTE — PROVIDER NOTIFICATION
Pt is in severe pain rating 10/10 on (L) shoulder. Pt administered IV dilaudid, po oxycodone, tylenol, and robaxin. Ice applied to the shoulder and repositioned. Assisted with deep breathing exercise. MD has been paged twice, at 2525 and 4024 for additional orders. Waiting for call back.   MD called back and changed IV dilaudid dose. Please see new order.

## 2018-08-31 NOTE — PLAN OF CARE
Problem: Patient Care Overview  Goal: Plan of Care/Patient Progress Review  Discharge Planner PT   Patient plan for discharge: home with sister, per OT  Current status: PT orders received, chart reviewed, discussed with OT. Pt mobilizing well, tolerated 500' ambulation and flight of stairs with SBA. Pt lives in [facility] and will have sister staying with her at disch.   Barriers to return to prior living situation: None anticipated  Recommendations for discharge: Home; defer to OT  Rationale for recommendations: Pt does not present with skilled IP PT needs at this time, mobilizing well and will have assist from sister at disch. PT orders completed.       Entered by: Dai Low 08/31/2018 2:56 PM

## 2018-08-31 NOTE — PROGRESS NOTES
08/31/18 0700   Quick Adds   Type of Visit Initial Occupational Therapy Evaluation   Living Environment   Lives With alone   Living Arrangements condominium   Home Accessibility no concerns   Number of Stairs to Enter Home 10   Transportation Available car;family or friend will provide   Living Environment Comment Lives in a condo on the second floor   Self-Care   Dominant Hand right   Usual Activity Tolerance fair   Activity/Exercise/Self-Care Comment Doesnt use DME at home, Independent with ADLs   Functional Level Prior   Ambulation 0-->independent   Transferring 0-->independent   Toileting 0-->independent   Bathing 0-->independent   Dressing 0-->independent   Eating 0-->independent   Communication 0-->understands/communicates without difficulty   Swallowing 0-->swallows foods/liquids without difficulty   Cognition 0 - no cognition issues reported   Fall history within last six months yes   Number of times patient has fallen within last six months 1   Which of the above functional risks had a recent onset or change? none   Prior Functional Level Comment Independent with ADLs at Baseline   General Information   Onset of Illness/Injury or Date of Surgery - Date 08/30/18   Referring Physician Con Tucker MD   Additional Occupational Profile Info/Pertinent History of Current Problem Total reverse shoulder on 8/30/2018, NWB RUE   Precautions/Limitations fall precautions   Weight-Bearing Status - RUE nonweight-bearing   General Observations Recieved in Bed on 2 L of O2   Cognitive Status Examination   Orientation orientation to person, place and time   Visual Perception   Visual Perception Wears glasses   Pain Assessment   Patient Currently in Pain Yes, see Vital Sign flowsheet   Strength   Strength Comments RUE WFL   Mobility   Bed Mobility Comments Pt refused this morning   Upper Body Dressing   Level of [place]: Dress Upper Body maximum assist (25% patients effort)   Grooming   Level of  "[place]: Grooming stand-by assist   Eating/Self Feeding   Level of [place]: Eating independent   Instrumental Activities of Daily Living (IADL)   Previous Responsibilities meal prep;housekeeping;shopping;yardwork;laundry;medication management;finances;driving   General Therapy Interventions   Planned Therapy Interventions ADL retraining   Clinical Impression   Criteria for Skilled Therapeutic Interventions Met yes, treatment indicated   OT Diagnosis Impairments in ADL performance   Influenced by the following impairments surgery, precautions   Assessment of Occupational Performance 3-5 Performance Deficits   Identified Performance Deficits dressing, bathing, grooming, IADL   Clinical Decision Making (Complexity) Moderate complexity   Therapy Frequency daily   Predicted Duration of Therapy Intervention (days/wks) 3 days   Anticipated Discharge Disposition Transitional Care Facility   Risks and Benefits of Treatment have been explained. Yes   Patient, Family & other staff in agreement with plan of care Yes   [facility] TM \"6 Clicks\"   2016, Trustees of [facility], under license to [employer].  All rights reserved.   6 Clicks Short Forms Daily Activity Inpatient Short Form   [facility]  \"6 Clicks\" Daily Activity Inpatient Short Form   1. Putting on and taking off regular lower body clothing? 2 - A Lot   2. Bathing (including washing, rinsing, drying)? 3 - A Little   3. Toileting, which includes using toilet, bedpan or urinal? 3 - A Little   4. Putting on and taking off regular upper body clothing? 2 - A Lot   5. Taking care of personal grooming such as brushing teeth? 4 - None   6. Eating meals? 4 - None   Daily Activity Raw Score (Score out of 24.Lower scores equate to lower levels of function) 18   Total Evaluation Time   Total Evaluation Time (Minutes) 8     "

## 2018-08-31 NOTE — PLAN OF CARE
Problem: Patient Care Overview  Goal: Plan of Care/Patient Progress Review  Discharge Planner OT   Patient plan for discharge: home  Current status: Eval received and completed, Pt presents after falling and sustaining 3 fracture in her LUE, she is s/p Left reverse shoulder arthroplasty, she is NWB on this side, in an abductor sling. She currently has 10/10 pain and was limited during eval due to this pain. Taught Pt LUE elbow flexion, wrist flexion and finger ROM, verbalized how to don/doff her sling and taught UE dressing. As stated in too much pain to demonstrate at this time. Will continue to see to follow and work on ADLs  Barriers to return to prior living situation: pain, strength, lack of techinque  Recommendations for discharge: difficulty to assess, at this point TCU as Pt lives alone, her sister maybe able to stay with her when she returns home, if this is the case then home with assist to help with don/doff her sling and upper body dressing as needed.   Rationale for recommendations: Will continue to see to address ADLs within her precautions       Entered by: Theron Mazariegos 08/31/2018 8:02 AM

## 2018-08-31 NOTE — PLAN OF CARE
Problem: Patient Care Overview  Goal: Plan of Care/Patient Progress Review  Outcome: Improving  Patient up with assist of 1. Adequate I/O. Continues to rate pain 9-10/10, MD aware. Pain managed with PO meds. Patient appears to rest well in between cares.

## 2018-08-31 NOTE — PLAN OF CARE
Problem: Patient Care Overview  Goal: Plan of Care/Patient Progress Review  Discharge Planner OT   Patient plan for discharge: Home with sister  Current status: second treatment completed. Pt will pain more controlled. Able to get EOB with SBA, walk 500 feet and complete stairs with SBA, needed one rest break at top of stairs. Pt able to complete LB sitting EOB, she and her sister were educated on how to don/doff sling and complete HEP. Pt and sister both verbalized understanding. Pt able to get on/off commode with SBA. Will continue to see to enforce precautions, complete HEP and continue to go over ADLs  Barriers to return to prior living situation: none  Recommendations for discharge: home with her sister  Rationale for recommendations: sister educated on how to don/doff sling and complete exercises with Pt, very supportive, safe to discharge with her.        Entered by: Theron Mazariegos 08/31/2018 2:16 PM

## 2018-08-31 NOTE — PROVIDER NOTIFICATION
MD Notification    Notified Person: On call DENNY Graf    Notified Person Name:  Dr. Son    Notification Date/Time: 8/31/18 06:15    Notification Interaction: page out to physician (3rd page)    Purpose of Notification: uncontrolled pain; clinically worsening/manifesting signs of uncontrolled pain ( tachypnea, tachycardia, increased blood pressure, anxiety/crying, etc.)    Orders Received: give 1 dose IV Toradol; asap    Comments: Pt states Toradol has worked very well for her in the past when prescribed by previous providers.

## 2018-09-01 ENCOUNTER — APPOINTMENT (OUTPATIENT)
Dept: OCCUPATIONAL THERAPY | Facility: CLINIC | Age: 78
DRG: 483 | End: 2018-09-01
Attending: ORTHOPAEDIC SURGERY
Payer: MEDICARE

## 2018-09-01 VITALS
TEMPERATURE: 99 F | SYSTOLIC BLOOD PRESSURE: 148 MMHG | BODY MASS INDEX: 26.56 KG/M2 | HEART RATE: 96 BPM | RESPIRATION RATE: 14 BRPM | HEIGHT: 61 IN | WEIGHT: 140.65 LBS | DIASTOLIC BLOOD PRESSURE: 77 MMHG | OXYGEN SATURATION: 95 %

## 2018-09-01 LAB — HGB BLD-MCNC: 10 G/DL (ref 11.7–15.7)

## 2018-09-01 PROCEDURE — 99232 SBSQ HOSP IP/OBS MODERATE 35: CPT | Performed by: INTERNAL MEDICINE

## 2018-09-01 PROCEDURE — 97535 SELF CARE MNGMENT TRAINING: CPT | Mod: GO

## 2018-09-01 PROCEDURE — 40000133 ZZH STATISTIC OT WARD VISIT

## 2018-09-01 PROCEDURE — 97110 THERAPEUTIC EXERCISES: CPT | Mod: GO

## 2018-09-01 PROCEDURE — 25000132 ZZH RX MED GY IP 250 OP 250 PS 637: Mod: GY | Performed by: ORTHOPAEDIC SURGERY

## 2018-09-01 PROCEDURE — 36415 COLL VENOUS BLD VENIPUNCTURE: CPT | Performed by: ORTHOPAEDIC SURGERY

## 2018-09-01 PROCEDURE — 99207 ZZC CDG-MDM COMPONENT: MEETS MODERATE - UP CODED: CPT | Performed by: INTERNAL MEDICINE

## 2018-09-01 PROCEDURE — 85018 HEMOGLOBIN: CPT | Performed by: ORTHOPAEDIC SURGERY

## 2018-09-01 PROCEDURE — A9270 NON-COVERED ITEM OR SERVICE: HCPCS | Mod: GY | Performed by: ORTHOPAEDIC SURGERY

## 2018-09-01 PROCEDURE — 25000125 ZZHC RX 250: Performed by: ORTHOPAEDIC SURGERY

## 2018-09-01 RX ADMIN — DULOXETINE HYDROCHLORIDE 80 MG: 20 CAPSULE, DELAYED RELEASE ORAL at 07:50

## 2018-09-01 RX ADMIN — Medication 0.25 MG: at 10:44

## 2018-09-01 RX ADMIN — SENNOSIDES AND DOCUSATE SODIUM 2 TABLET: 8.6; 5 TABLET ORAL at 07:50

## 2018-09-01 RX ADMIN — OXYCODONE HYDROCHLORIDE 5 MG: 5 TABLET ORAL at 09:39

## 2018-09-01 RX ADMIN — ACETAMINOPHEN 975 MG: 325 TABLET, FILM COATED ORAL at 06:10

## 2018-09-01 RX ADMIN — ONDANSETRON 4 MG: 4 TABLET, ORALLY DISINTEGRATING ORAL at 12:38

## 2018-09-01 RX ADMIN — OXYCODONE HYDROCHLORIDE 5 MG: 5 TABLET ORAL at 06:10

## 2018-09-01 RX ADMIN — OMEPRAZOLE 20 MG: 20 CAPSULE, DELAYED RELEASE ORAL at 06:14

## 2018-09-01 ASSESSMENT — ACTIVITIES OF DAILY LIVING (ADL)
ADLS_ACUITY_SCORE: 10

## 2018-09-01 NOTE — PLAN OF CARE
Problem: Patient Care Overview  Goal: Plan of Care/Patient Progress Review  Discharge Planner OT   Patient plan for discharge: Home with assist from family (sister)  Current status: Pt waiting therapist arrival as discharge has been completed. Wanting education on UE dressing an sling management before going home. Pt instructed in HEP for shoulder and demonstrated exercises with family present. Completed full body dressing, min A for UE dressing for sling management and FM aspects. Pt continually requiring prompts for limited motion at LUE, given prompts and ideas for home management. Pt discharging home with sister for assistance as needed  Barriers to return to prior living situation: Decreased activity tolerance, ADLs, and pain limiting movement  Recommendations for discharge: Home with assist for all ADLs  Rationale for recommendations: Decreased activity tolerance, pain, and decreased independence with ADLs (dressing and bathing)       Entered by: Cammie Michele 09/01/2018 3:11 PM

## 2018-09-01 NOTE — PROGRESS NOTES
[facility]    Hospitalist Progress Note    Assessment & Plan   Elzbieta Ivan is a 77 year old female with PMHx of paroxysmal SVT with palpitations, hyperlipidemia, hx of carotid artery occlusion w/o stroke, GIGI (noncompliant with CPAP), GERD, anxiety, depression, fibromyalgia and OA who underwent a reverse left total shoulder arthroplasty per Dr. MECHELLE Tucker on 8/30/2018 for management of shoulder fractures (x3) after a fall. Hospitalist service was consulted to assist with postop medical co-management.    S/p L Reverse Total Shoulder Arthorplasty on 8/30/18: POD #1  -- routine postop cares per ortho including pain control, DVT ppx and therapies  -- has sched Tylenol, prn oxycodone and prn IV dilaudid available for pain  -- has sched bowel regimen  -- PT/OT  -- Overall remain stable, further management as per orthopedics.     Paroxysmal SVT with hx of palpitations  Hx of carotid artery occlusion w/o stroke  Not on medications. No concerns at this time    Hyperlipidemia:  Chronic and stable on statin    GERD:  Chronic and stable on PPI.    GIGI:  Does not use CPAP. Monitor for apnea with use of narcotics postop.     Anxiety / Depression:  Fibromyalgia  Chronic and stable on Cymbalta 80mg daily and Xanax (0.125- 0.25mg daily as needed)    Overall remain stable at this time.     Pain Assessment:  Current Pain Score 9/1/2018   Patient currently in pain? -   Pain score (0-10) 5   Pain location -   Pain descriptors -   - Elzbieta is experiencing pain due to shoulder surgery. Pain management was discussed and the plan was created in a collaborative fashion.  Elzbieta's response to the current recommendations: compliant  - Please see the plan for pain management as documented above    FEN: cont IVFs (D5 1/2NS with KCl @75ml/h) until steadily taking po, lytes stable, regular diet ordered  DVT Prophylaxis: PCDs  Code Status: Full Code    Disposition: Discharge per ortho.     Jony Kaplan  History   Seen this morning. Endorses ongoing pain which at times has made her cry. Minimal appetite at this time dt ongoing pain but denies abd pain/n/v. No cp/sob/cough.    -Data reviewed today: I reviewed all new labs and imaging results over the last 24 hours. I personally reviewed no images or EKG's today.    Physical Exam   Temp: 99  F (37.2  C) Temp src: Oral BP: 148/77 Pulse: 96 Heart Rate: 95 Resp: 14 SpO2: 95 % O2 Device: None (Room air) Oxygen Delivery: 1 LPM  Vitals:    08/30/18 1211   Weight: 63.8 kg (140 lb 10.5 oz)     Vital Signs with Ranges  Temp:  [97.6  F (36.4  C)-99.2  F (37.3  C)] 99  F (37.2  C)  Pulse:  [89-96] 96  Heart Rate:  [86-95] 95  Resp:  [14-16] 14  BP: (125-160)/(64-84) 148/77  SpO2:  [93 %-95 %] 95 %  I/O last 3 completed shifts:  In: 840 [P.O.:840]  Out: 550 [Urine:550]    Constitutional: Resting quietly in bed, alert and conversing appropriately, NAD  Respiratory: CTAB, no wheeze/rales/rhonchi  Cardiovascular: HRRR, no MGR, no LE edema  GI: S, NT, ND, +S  Skin/Integumen: warm/dry  Other:      Medications       acetaminophen  975 mg Oral Q8H     DULoxetine (CYMBALTA) EC capsule 80 mg  80 mg Oral Daily     omeprazole (priLOSEC) CR capsule 20 mg  20 mg Oral Every Other Day     senna-docusate  1 tablet Oral BID    Or     senna-docusate  2 tablet Oral BID     simvastatin (ZOCOR) tablet 40 mg  40 mg Oral Daily     sodium chloride (PF)  3 mL Intracatheter Q8H       Data     Recent Labs  Lab 09/01/18  0647 08/31/18  1428 08/30/18  1236   HGB 10.0* 9.8* 11.6*   INR  --   --  0.96   NA  --   --  140   POTASSIUM  --   --  4.1   CHLORIDE  --   --  105   CO2  --   --  27   BUN  --   --  17   CR  --   --  0.85   ANIONGAP  --   --  8   KALANI  --   --  8.7   GLC  --   --  86       No results found for this or any previous visit (from the past 24 hour(s)).

## 2018-09-01 NOTE — PLAN OF CARE
Problem: Patient Care Overview  Goal: Plan of Care/Patient Progress Review  Outcome: Improving  Pt denies any pain med when offered. Nausea improved after nausea given. Pt discharge teaching went over with pt and sister. Pt belonging was given back to pt as well.

## 2018-09-01 NOTE — PLAN OF CARE
Problem: Patient Care Overview  Goal: Plan of Care/Patient Progress Review  Occupational Therapy Discharge Summary    Reason for therapy discharge:    Discharged to home.    Progress towards therapy goal(s). See [facility] on [facility] Plan in [facility] record for goal details.  Goals partially met.  Barriers to achieving goals:   discharge from facility.    Therapy recommendation(s):    Continue home exercise program.

## 2018-09-01 NOTE — PLAN OF CARE
Problem: Patient Care Overview  Goal: Plan of Care/Patient Progress Review  Outcome: Improving  Patient is A&O, VSS on 1L NC, CMS intact, regular diet, up with SBA to the bathroom, and sling on shoulder. Dressing CDI, Oxycodone and Tylenol for pain control, and No IV assess. Looking forward to going home today, will continue to monitor.

## 2018-09-01 NOTE — PLAN OF CARE
Problem: Patient Care Overview  Goal: Plan of Care/Patient Progress Review  Outcome: No Change  Vss, AOx4. Incision is covered, cdi, no drainage. Dressing changed on day shift. Voiding adequately. Sling in place. Pain in shoulder at 9/10, managed with prn oxycodone and robaxin. Denies nausea. Up to the bathroom with standby assist. Regular diet. Cms intact, pulses 2+. 1L O2.

## 2018-09-01 NOTE — PLAN OF CARE
"Problem: Patient Care Overview  Goal: Plan of Care/Patient Progress Review  Discharge Planner OT   Patient plan for discharge: Home with family  Current status: Pt up in bathroom upon therapist arrival, agreeable to therapy for attempting sling donning/doffing and UE dressing. Family (sister) present. Seated EOB pt required extra time and verbal cues for each step of doffing sling, however did not require any physical assistance. Reminders for limited movement at shoulder and steps to doff. Once sling was doffed, pt quickly became nauseated and sweaty/lightheaded. Given cool washcloth and instructed in PLB. Pt stated going into an \"anxiety attack\" and needed meds from nurse. Max A to return sling and lay down with HOB slightly elevated to calm nausea. Call light on to alert nurse, pt left with sister and all needs within reach.  Barriers to return to prior living situation: Decreased activity tolerance and independence with ADLs/IADLs  Recommendations for discharge: home with family assist   Rationale for recommendations: Pt currently not at baseline for activity tolerance or ADLs. Continued OT in acute setting until activity tolerance and independence allows full UE dressing.       Entered by: Cammie Michele 09/01/2018 11:45 AM         "

## 2018-09-01 NOTE — PLAN OF CARE
Problem: Patient Care Overview  Goal: Plan of Care/Patient Progress Review  Outcome: Improving  Patient up with assist of 1 and walker. Adequate I/O. Pain managed with PO meds, continuing to improve. Dressing changed. Plan to discharge home with sister after OT today.

## 2018-09-17 NOTE — ADDENDUM NOTE
Addendum  created 09/17/18 0822 by John More MD    Anesthesia Intra Blocks edited, Child order released for a procedure order, Order Canceled from Note, Sign clinical note

## 2018-09-17 NOTE — ANESTHESIA PROCEDURE NOTES
Peripheral nerve/Neuraxial procedure note : Interscalene  Pre-Procedure  Performed by MANUEL RAUSCH  Location: pre-op      Pre-Anesthestic Checklist: patient identified, IV checked, site marked, risks and benefits discussed, informed consent, monitors and equipment checked, pre-op evaluation, at physician/surgeon's request and post-op pain management    Timeout  Correct Patient: Yes   Correct Procedure: Yes   Correct Site: Yes   Correct Laterality: Yes   Correct Position: Yes   Site Marked: Yes   .   Procedure Documentation    .    Procedure:  left  Interscalene.  Local skin infiltrated with 1 mL of 1% lidocaine.     Ultrasound used to identify targeted nerve, plexus, or vascular marker and placed a needle adjacent to it., Ultrasound was used to visualize the spread of the anesthetic in close proximity to the above stated nerve. A permanent image is entered into the patient's record.  Patient Prep;chlorhexidine gluconate and isopropyl alcohol.  .  Needle: insulated Needle Gauge: 21.  Needle Length (millimeters) 100  Insertion Method: Single Shot.       Assessment/Narrative  Paresthesias: No.  .  The placement was negative for: blood aspirated, painful injection and site bleeding.  Bolus given via needle..   Secured via.   Complications: none. Test dose of mL at. Test dose negative for signs of intravascular, subdural or intrathecal injection. Comments:  Bolus via needle, 20 ml of 0.5% ropivacaine with 1:400,000 epinephrine  Patient tolerated well, was mildly sedated but communicative throughout the procedure.    The surgeon has given a verbal order transferring care of this patient to me for the performance of regional analgesia block for post op pain control. It is requested of me because I am uniquely trained and qualified to perform this block and the surgeon is neither trained nor qualified to perform this procedure.

## 2020-02-04 ENCOUNTER — APPOINTMENT (OUTPATIENT)
Dept: GENERAL RADIOLOGY | Facility: CLINIC | Age: 80
DRG: 029 | End: 2020-02-04
Attending: ORTHOPAEDIC SURGERY
Payer: COMMERCIAL

## 2020-02-04 ENCOUNTER — HOSPITAL ENCOUNTER (INPATIENT)
Facility: CLINIC | Age: 80
LOS: 1 days | Discharge: HOME | DRG: 029 | End: 2020-02-05
Attending: ORTHOPAEDIC SURGERY | Admitting: ORTHOPAEDIC SURGERY
Payer: COMMERCIAL

## 2020-02-04 ENCOUNTER — ANESTHESIA EVENT (OUTPATIENT)
Dept: SURGERY | Facility: CLINIC | Age: 80
DRG: 029 | End: 2020-02-04
Payer: COMMERCIAL

## 2020-02-04 ENCOUNTER — ANESTHESIA (OUTPATIENT)
Dept: SURGERY | Facility: CLINIC | Age: 80
DRG: 029 | End: 2020-02-04
Payer: COMMERCIAL

## 2020-02-04 DIAGNOSIS — M71.38 SYNOVIAL CYST OF LUMBAR SPINE: Primary | ICD-10-CM

## 2020-02-04 LAB
GLUCOSE BLDC GLUCOMTR-MCNC: 85 MG/DL (ref 70–99)
HGB BLD-MCNC: 12.5 G/DL (ref 11.7–15.7)

## 2020-02-04 PROCEDURE — 27210794 ZZH OR GENERAL SUPPLY STERILE: Performed by: ORTHOPAEDIC SURGERY

## 2020-02-04 PROCEDURE — 25000128 H RX IP 250 OP 636: Performed by: ORTHOPAEDIC SURGERY

## 2020-02-04 PROCEDURE — 71000012 ZZH RECOVERY PHASE 1 LEVEL 1 FIRST HR: Performed by: ORTHOPAEDIC SURGERY

## 2020-02-04 PROCEDURE — 36000093 ZZH SURGERY LEVEL 4 1ST 30 MIN: Performed by: ORTHOPAEDIC SURGERY

## 2020-02-04 PROCEDURE — 99232 SBSQ HOSP IP/OBS MODERATE 35: CPT | Performed by: NURSE PRACTITIONER

## 2020-02-04 PROCEDURE — 25800030 ZZH RX IP 258 OP 636: Performed by: NURSE ANESTHETIST, CERTIFIED REGISTERED

## 2020-02-04 PROCEDURE — 37000009 ZZH ANESTHESIA TECHNICAL FEE, EACH ADDTL 15 MIN: Performed by: ORTHOPAEDIC SURGERY

## 2020-02-04 PROCEDURE — 37000008 ZZH ANESTHESIA TECHNICAL FEE, 1ST 30 MIN: Performed by: ORTHOPAEDIC SURGERY

## 2020-02-04 PROCEDURE — C1763 CONN TISS, NON-HUMAN: HCPCS | Performed by: ORTHOPAEDIC SURGERY

## 2020-02-04 PROCEDURE — 25000128 H RX IP 250 OP 636: Performed by: NURSE ANESTHETIST, CERTIFIED REGISTERED

## 2020-02-04 PROCEDURE — 40000170 ZZH STATISTIC PRE-PROCEDURE ASSESSMENT II: Performed by: ORTHOPAEDIC SURGERY

## 2020-02-04 PROCEDURE — 00000146 ZZHCL STATISTIC GLUCOSE BY METER IP

## 2020-02-04 PROCEDURE — 25000125 ZZHC RX 250: Performed by: ORTHOPAEDIC SURGERY

## 2020-02-04 PROCEDURE — 25000301 ZZH OR RX SURGIFLO W/THROMBIN KIT 2ML 1991 OPNP: Performed by: ORTHOPAEDIC SURGERY

## 2020-02-04 PROCEDURE — 71000013 ZZH RECOVERY PHASE 1 LEVEL 1 EA ADDTL HR: Performed by: ORTHOPAEDIC SURGERY

## 2020-02-04 PROCEDURE — 12000000 ZZH R&B MED SURG/OB

## 2020-02-04 PROCEDURE — 25000132 ZZH RX MED GY IP 250 OP 250 PS 637: Performed by: ORTHOPAEDIC SURGERY

## 2020-02-04 PROCEDURE — 25800030 ZZH RX IP 258 OP 636: Performed by: ANESTHESIOLOGY

## 2020-02-04 PROCEDURE — 00QT0ZZ REPAIR SPINAL MENINGES, OPEN APPROACH: ICD-10-PCS | Performed by: ORTHOPAEDIC SURGERY

## 2020-02-04 PROCEDURE — 25000566 ZZH SEVOFLURANE, EA 15 MIN: Performed by: ORTHOPAEDIC SURGERY

## 2020-02-04 PROCEDURE — 01NB0ZZ RELEASE LUMBAR NERVE, OPEN APPROACH: ICD-10-PCS | Performed by: ORTHOPAEDIC SURGERY

## 2020-02-04 PROCEDURE — 36000063 ZZH SURGERY LEVEL 4 EA 15 ADDTL MIN: Performed by: ORTHOPAEDIC SURGERY

## 2020-02-04 PROCEDURE — 25000125 ZZHC RX 250: Performed by: NURSE ANESTHETIST, CERTIFIED REGISTERED

## 2020-02-04 PROCEDURE — 88305 TISSUE EXAM BY PATHOLOGIST: CPT | Mod: 26 | Performed by: ORTHOPAEDIC SURGERY

## 2020-02-04 PROCEDURE — 01NR0ZZ RELEASE SACRAL NERVE, OPEN APPROACH: ICD-10-PCS | Performed by: ORTHOPAEDIC SURGERY

## 2020-02-04 PROCEDURE — 85018 HEMOGLOBIN: CPT | Performed by: ANESTHESIOLOGY

## 2020-02-04 PROCEDURE — 88305 TISSUE EXAM BY PATHOLOGIST: CPT | Performed by: ORTHOPAEDIC SURGERY

## 2020-02-04 PROCEDURE — 40000985 XR LUMBAR SPINE PORT 1 VW

## 2020-02-04 DEVICE — GRAFT DURAGEN 1X1" ID-110: Type: IMPLANTABLE DEVICE | Site: SPINE LUMBAR | Status: FUNCTIONAL

## 2020-02-04 RX ORDER — ONDANSETRON 4 MG/1
4 TABLET, ORALLY DISINTEGRATING ORAL EVERY 6 HOURS PRN
Status: DISCONTINUED | OUTPATIENT
Start: 2020-02-04 | End: 2020-02-05 | Stop reason: HOSPADM

## 2020-02-04 RX ORDER — LABETALOL HYDROCHLORIDE 5 MG/ML
10 INJECTION, SOLUTION INTRAVENOUS
Status: DISCONTINUED | OUTPATIENT
Start: 2020-02-04 | End: 2020-02-04 | Stop reason: HOSPADM

## 2020-02-04 RX ORDER — HYDROMORPHONE HYDROCHLORIDE 1 MG/ML
.3-.5 INJECTION, SOLUTION INTRAMUSCULAR; INTRAVENOUS; SUBCUTANEOUS EVERY 5 MIN PRN
Status: DISCONTINUED | OUTPATIENT
Start: 2020-02-04 | End: 2020-02-04 | Stop reason: HOSPADM

## 2020-02-04 RX ORDER — NEOSTIGMINE METHYLSULFATE 1 MG/ML
VIAL (ML) INJECTION PRN
Status: DISCONTINUED | OUTPATIENT
Start: 2020-02-04 | End: 2020-02-04

## 2020-02-04 RX ORDER — OXYCODONE HYDROCHLORIDE 5 MG/1
5-10 TABLET ORAL EVERY 4 HOURS PRN
Status: DISCONTINUED | OUTPATIENT
Start: 2020-02-04 | End: 2020-02-05 | Stop reason: HOSPADM

## 2020-02-04 RX ORDER — HYDRALAZINE HYDROCHLORIDE 20 MG/ML
2.5-5 INJECTION INTRAMUSCULAR; INTRAVENOUS EVERY 10 MIN PRN
Status: DISCONTINUED | OUTPATIENT
Start: 2020-02-04 | End: 2020-02-04 | Stop reason: HOSPADM

## 2020-02-04 RX ORDER — GLYCOPYRROLATE 0.2 MG/ML
INJECTION, SOLUTION INTRAMUSCULAR; INTRAVENOUS PRN
Status: DISCONTINUED | OUTPATIENT
Start: 2020-02-04 | End: 2020-02-04

## 2020-02-04 RX ORDER — LIDOCAINE HYDROCHLORIDE 20 MG/ML
INJECTION, SOLUTION INFILTRATION; PERINEURAL PRN
Status: DISCONTINUED | OUTPATIENT
Start: 2020-02-04 | End: 2020-02-04

## 2020-02-04 RX ORDER — CEFAZOLIN SODIUM 1 G/3ML
1 INJECTION, POWDER, FOR SOLUTION INTRAMUSCULAR; INTRAVENOUS SEE ADMIN INSTRUCTIONS
Status: DISCONTINUED | OUTPATIENT
Start: 2020-02-04 | End: 2020-02-04 | Stop reason: HOSPADM

## 2020-02-04 RX ORDER — DULOXETIN HYDROCHLORIDE 30 MG/1
90 CAPSULE, DELAYED RELEASE ORAL DAILY
Status: DISCONTINUED | OUTPATIENT
Start: 2020-02-04 | End: 2020-02-05 | Stop reason: HOSPADM

## 2020-02-04 RX ORDER — CEFAZOLIN SODIUM 2 G/100ML
2 INJECTION, SOLUTION INTRAVENOUS
Status: COMPLETED | OUTPATIENT
Start: 2020-02-04 | End: 2020-02-04

## 2020-02-04 RX ORDER — IPRATROPIUM BROMIDE 42 UG/1
2 SPRAY, METERED NASAL DAILY PRN
Status: DISCONTINUED | OUTPATIENT
Start: 2020-02-04 | End: 2020-02-05 | Stop reason: HOSPADM

## 2020-02-04 RX ORDER — SODIUM CHLORIDE, SODIUM LACTATE, POTASSIUM CHLORIDE, CALCIUM CHLORIDE 600; 310; 30; 20 MG/100ML; MG/100ML; MG/100ML; MG/100ML
INJECTION, SOLUTION INTRAVENOUS CONTINUOUS
Status: DISCONTINUED | OUTPATIENT
Start: 2020-02-04 | End: 2020-02-04 | Stop reason: HOSPADM

## 2020-02-04 RX ORDER — DULOXETIN HYDROCHLORIDE 30 MG/1
30 CAPSULE, DELAYED RELEASE ORAL DAILY
COMMUNITY
End: 2022-12-02

## 2020-02-04 RX ORDER — NALOXONE HYDROCHLORIDE 0.4 MG/ML
.1-.4 INJECTION, SOLUTION INTRAMUSCULAR; INTRAVENOUS; SUBCUTANEOUS
Status: DISCONTINUED | OUTPATIENT
Start: 2020-02-04 | End: 2020-02-05 | Stop reason: HOSPADM

## 2020-02-04 RX ORDER — EPHEDRINE SULFATE 50 MG/ML
INJECTION, SOLUTION INTRAMUSCULAR; INTRAVENOUS; SUBCUTANEOUS PRN
Status: DISCONTINUED | OUTPATIENT
Start: 2020-02-04 | End: 2020-02-04

## 2020-02-04 RX ORDER — BUPIVACAINE HYDROCHLORIDE AND EPINEPHRINE 2.5; 5 MG/ML; UG/ML
INJECTION, SOLUTION INFILTRATION; PERINEURAL PRN
Status: DISCONTINUED | OUTPATIENT
Start: 2020-02-04 | End: 2020-02-04 | Stop reason: HOSPADM

## 2020-02-04 RX ORDER — ONDANSETRON 2 MG/ML
4 INJECTION INTRAMUSCULAR; INTRAVENOUS EVERY 30 MIN PRN
Status: DISCONTINUED | OUTPATIENT
Start: 2020-02-04 | End: 2020-02-04 | Stop reason: HOSPADM

## 2020-02-04 RX ORDER — ONDANSETRON 2 MG/ML
INJECTION INTRAMUSCULAR; INTRAVENOUS PRN
Status: DISCONTINUED | OUTPATIENT
Start: 2020-02-04 | End: 2020-02-04

## 2020-02-04 RX ORDER — IPRATROPIUM BROMIDE 42 UG/1
2 SPRAY, METERED NASAL DAILY PRN
COMMUNITY
End: 2023-05-25

## 2020-02-04 RX ORDER — DULOXETIN HYDROCHLORIDE 30 MG/1
60 CAPSULE, DELAYED RELEASE ORAL DAILY
Status: DISCONTINUED | OUTPATIENT
Start: 2020-02-04 | End: 2020-02-04 | Stop reason: ALTCHOICE

## 2020-02-04 RX ORDER — HYDROCODONE BITARTRATE AND ACETAMINOPHEN 5; 325 MG/1; MG/1
1 TABLET ORAL EVERY 6 HOURS PRN
Status: ON HOLD | COMMUNITY
End: 2020-02-05

## 2020-02-04 RX ORDER — HYDROMORPHONE HYDROCHLORIDE 1 MG/ML
.3-.5 INJECTION, SOLUTION INTRAMUSCULAR; INTRAVENOUS; SUBCUTANEOUS
Status: DISCONTINUED | OUTPATIENT
Start: 2020-02-04 | End: 2020-02-05 | Stop reason: HOSPADM

## 2020-02-04 RX ORDER — FENTANYL CITRATE 50 UG/ML
INJECTION, SOLUTION INTRAMUSCULAR; INTRAVENOUS PRN
Status: DISCONTINUED | OUTPATIENT
Start: 2020-02-04 | End: 2020-02-04

## 2020-02-04 RX ORDER — ACETAMINOPHEN 325 MG/1
975 TABLET ORAL ONCE
Status: COMPLETED | OUTPATIENT
Start: 2020-02-04 | End: 2020-02-04

## 2020-02-04 RX ORDER — ACETAMINOPHEN 325 MG/1
650 TABLET ORAL EVERY 4 HOURS PRN
Status: DISCONTINUED | OUTPATIENT
Start: 2020-02-07 | End: 2020-02-05 | Stop reason: HOSPADM

## 2020-02-04 RX ORDER — AMOXICILLIN 250 MG
1 CAPSULE ORAL 2 TIMES DAILY
Status: DISCONTINUED | OUTPATIENT
Start: 2020-02-04 | End: 2020-02-05 | Stop reason: HOSPADM

## 2020-02-04 RX ORDER — CEFAZOLIN SODIUM 1 G/3ML
1 INJECTION, POWDER, FOR SOLUTION INTRAMUSCULAR; INTRAVENOUS EVERY 8 HOURS
Status: COMPLETED | OUTPATIENT
Start: 2020-02-04 | End: 2020-02-05

## 2020-02-04 RX ORDER — ONDANSETRON 2 MG/ML
4 INJECTION INTRAMUSCULAR; INTRAVENOUS EVERY 6 HOURS PRN
Status: DISCONTINUED | OUTPATIENT
Start: 2020-02-04 | End: 2020-02-05 | Stop reason: HOSPADM

## 2020-02-04 RX ORDER — ONDANSETRON 4 MG/1
4 TABLET, ORALLY DISINTEGRATING ORAL EVERY 30 MIN PRN
Status: DISCONTINUED | OUTPATIENT
Start: 2020-02-04 | End: 2020-02-04 | Stop reason: HOSPADM

## 2020-02-04 RX ORDER — SIMVASTATIN 40 MG
40 TABLET ORAL DAILY
Status: DISCONTINUED | OUTPATIENT
Start: 2020-02-04 | End: 2020-02-05 | Stop reason: HOSPADM

## 2020-02-04 RX ORDER — FENTANYL CITRATE 50 UG/ML
25-50 INJECTION, SOLUTION INTRAMUSCULAR; INTRAVENOUS
Status: DISCONTINUED | OUTPATIENT
Start: 2020-02-04 | End: 2020-02-04 | Stop reason: HOSPADM

## 2020-02-04 RX ORDER — NALOXONE HYDROCHLORIDE 0.4 MG/ML
.1-.4 INJECTION, SOLUTION INTRAMUSCULAR; INTRAVENOUS; SUBCUTANEOUS
Status: DISCONTINUED | OUTPATIENT
Start: 2020-02-04 | End: 2020-02-04

## 2020-02-04 RX ORDER — PROPOFOL 10 MG/ML
INJECTION, EMULSION INTRAVENOUS CONTINUOUS PRN
Status: DISCONTINUED | OUTPATIENT
Start: 2020-02-04 | End: 2020-02-04

## 2020-02-04 RX ORDER — GABAPENTIN 100 MG/1
100 CAPSULE ORAL
Status: COMPLETED | OUTPATIENT
Start: 2020-02-04 | End: 2020-02-04

## 2020-02-04 RX ORDER — METOCLOPRAMIDE HYDROCHLORIDE 5 MG/ML
5 INJECTION INTRAMUSCULAR; INTRAVENOUS EVERY 6 HOURS PRN
Status: DISCONTINUED | OUTPATIENT
Start: 2020-02-04 | End: 2020-02-05 | Stop reason: HOSPADM

## 2020-02-04 RX ORDER — METOCLOPRAMIDE 5 MG/1
5 TABLET ORAL EVERY 6 HOURS PRN
Status: DISCONTINUED | OUTPATIENT
Start: 2020-02-04 | End: 2020-02-05 | Stop reason: HOSPADM

## 2020-02-04 RX ORDER — ALPRAZOLAM 0.5 MG/1
.125-.25 TABLET, EXTENDED RELEASE ORAL DAILY PRN
Status: DISCONTINUED | OUTPATIENT
Start: 2020-02-04 | End: 2020-02-05 | Stop reason: HOSPADM

## 2020-02-04 RX ORDER — LIDOCAINE 40 MG/G
CREAM TOPICAL
Status: DISCONTINUED | OUTPATIENT
Start: 2020-02-04 | End: 2020-02-05 | Stop reason: HOSPADM

## 2020-02-04 RX ORDER — ACETAMINOPHEN 325 MG/1
975 TABLET ORAL EVERY 8 HOURS
Status: DISCONTINUED | OUTPATIENT
Start: 2020-02-04 | End: 2020-02-05 | Stop reason: HOSPADM

## 2020-02-04 RX ORDER — PROPOFOL 10 MG/ML
INJECTION, EMULSION INTRAVENOUS PRN
Status: DISCONTINUED | OUTPATIENT
Start: 2020-02-04 | End: 2020-02-04

## 2020-02-04 RX ORDER — AMOXICILLIN 250 MG
2 CAPSULE ORAL 2 TIMES DAILY
Status: DISCONTINUED | OUTPATIENT
Start: 2020-02-04 | End: 2020-02-05 | Stop reason: HOSPADM

## 2020-02-04 RX ORDER — CHOLECALCIFEROL (VITAMIN D3) 50 MCG
2000 TABLET ORAL DAILY
Status: DISCONTINUED | OUTPATIENT
Start: 2020-02-04 | End: 2020-02-04 | Stop reason: DRUGHIGH

## 2020-02-04 RX ADMIN — PHENYLEPHRINE HYDROCHLORIDE 50 MCG: 10 INJECTION INTRAVENOUS at 08:54

## 2020-02-04 RX ADMIN — NEOSTIGMINE METHYLSULFATE 3 MG: 1 INJECTION, SOLUTION INTRAVENOUS at 10:51

## 2020-02-04 RX ADMIN — PROPOFOL 130 MG: 10 INJECTION, EMULSION INTRAVENOUS at 07:46

## 2020-02-04 RX ADMIN — PHENYLEPHRINE HYDROCHLORIDE 50 MCG: 10 INJECTION INTRAVENOUS at 09:52

## 2020-02-04 RX ADMIN — PHENYLEPHRINE HYDROCHLORIDE 0.25 MCG: 10 INJECTION INTRAVENOUS at 08:19

## 2020-02-04 RX ADMIN — ACETAMINOPHEN 975 MG: 325 TABLET, FILM COATED ORAL at 22:14

## 2020-02-04 RX ADMIN — PHENYLEPHRINE HYDROCHLORIDE 50 MCG: 10 INJECTION INTRAVENOUS at 08:03

## 2020-02-04 RX ADMIN — LIDOCAINE HYDROCHLORIDE 80 MG: 20 INJECTION, SOLUTION INFILTRATION; PERINEURAL at 07:45

## 2020-02-04 RX ADMIN — SIMVASTATIN 40 MG: 40 TABLET, FILM COATED ORAL at 20:57

## 2020-02-04 RX ADMIN — PHENYLEPHRINE HYDROCHLORIDE 50 MCG: 10 INJECTION INTRAVENOUS at 09:45

## 2020-02-04 RX ADMIN — FENTANYL CITRATE 50 MCG: 50 INJECTION, SOLUTION INTRAMUSCULAR; INTRAVENOUS at 07:32

## 2020-02-04 RX ADMIN — ROCURONIUM BROMIDE 5 MG: 10 INJECTION INTRAVENOUS at 09:24

## 2020-02-04 RX ADMIN — SENNOSIDES AND DOCUSATE SODIUM 1 TABLET: 8.6; 5 TABLET ORAL at 20:57

## 2020-02-04 RX ADMIN — PHENYLEPHRINE HYDROCHLORIDE 50 MCG: 10 INJECTION INTRAVENOUS at 08:09

## 2020-02-04 RX ADMIN — PHENYLEPHRINE HYDROCHLORIDE 50 MCG: 10 INJECTION INTRAVENOUS at 08:13

## 2020-02-04 RX ADMIN — FENTANYL CITRATE 25 MCG: 50 INJECTION, SOLUTION INTRAMUSCULAR; INTRAVENOUS at 10:25

## 2020-02-04 RX ADMIN — PROPOFOL 25 MCG/KG/MIN: 10 INJECTION, EMULSION INTRAVENOUS at 07:56

## 2020-02-04 RX ADMIN — CEFAZOLIN SODIUM 2 G: 2 INJECTION, SOLUTION INTRAVENOUS at 07:51

## 2020-02-04 RX ADMIN — GABAPENTIN 100 MG: 100 CAPSULE ORAL at 06:27

## 2020-02-04 RX ADMIN — OMEPRAZOLE 20 MG: 20 CAPSULE, DELAYED RELEASE ORAL at 17:10

## 2020-02-04 RX ADMIN — ACETAMINOPHEN 975 MG: 325 TABLET, FILM COATED ORAL at 06:27

## 2020-02-04 RX ADMIN — PHENYLEPHRINE HYDROCHLORIDE 50 MCG: 10 INJECTION INTRAVENOUS at 08:05

## 2020-02-04 RX ADMIN — ROCURONIUM BROMIDE 35 MG: 10 INJECTION INTRAVENOUS at 07:47

## 2020-02-04 RX ADMIN — OXYCODONE HYDROCHLORIDE 5 MG: 5 TABLET ORAL at 18:12

## 2020-02-04 RX ADMIN — GLYCOPYRROLATE 0.4 MG: 0.2 INJECTION, SOLUTION INTRAMUSCULAR; INTRAVENOUS at 10:49

## 2020-02-04 RX ADMIN — ROCURONIUM BROMIDE 5 MG: 10 INJECTION INTRAVENOUS at 10:05

## 2020-02-04 RX ADMIN — ROCURONIUM BROMIDE 10 MG: 10 INJECTION INTRAVENOUS at 08:33

## 2020-02-04 RX ADMIN — SODIUM CHLORIDE, POTASSIUM CHLORIDE, SODIUM LACTATE AND CALCIUM CHLORIDE: 600; 310; 30; 20 INJECTION, SOLUTION INTRAVENOUS at 09:12

## 2020-02-04 RX ADMIN — SODIUM CHLORIDE, POTASSIUM CHLORIDE, SODIUM LACTATE AND CALCIUM CHLORIDE: 600; 310; 30; 20 INJECTION, SOLUTION INTRAVENOUS at 06:20

## 2020-02-04 RX ADMIN — PHENYLEPHRINE HYDROCHLORIDE 50 MCG: 10 INJECTION INTRAVENOUS at 08:16

## 2020-02-04 RX ADMIN — OXYCODONE HYDROCHLORIDE 5 MG: 5 TABLET ORAL at 22:14

## 2020-02-04 RX ADMIN — HYDROMORPHONE HYDROCHLORIDE 0.25 MG: 1 INJECTION, SOLUTION INTRAMUSCULAR; INTRAVENOUS; SUBCUTANEOUS at 08:54

## 2020-02-04 RX ADMIN — ACETAMINOPHEN 975 MG: 325 TABLET, FILM COATED ORAL at 17:06

## 2020-02-04 RX ADMIN — HYDROMORPHONE HYDROCHLORIDE 0.25 MG: 1 INJECTION, SOLUTION INTRAMUSCULAR; INTRAVENOUS; SUBCUTANEOUS at 09:38

## 2020-02-04 RX ADMIN — CEFAZOLIN 1 G: 1 INJECTION, POWDER, FOR SOLUTION INTRAMUSCULAR; INTRAVENOUS at 20:57

## 2020-02-04 RX ADMIN — PHENYLEPHRINE HYDROCHLORIDE 50 MCG: 10 INJECTION INTRAVENOUS at 08:22

## 2020-02-04 RX ADMIN — PHENYLEPHRINE HYDROCHLORIDE 50 MCG: 10 INJECTION INTRAVENOUS at 08:07

## 2020-02-04 RX ADMIN — ONDANSETRON 4 MG: 2 INJECTION INTRAMUSCULAR; INTRAVENOUS at 10:37

## 2020-02-04 RX ADMIN — PHENYLEPHRINE HYDROCHLORIDE 50 MCG: 10 INJECTION INTRAVENOUS at 08:56

## 2020-02-04 RX ADMIN — Medication 5 MG: at 09:11

## 2020-02-04 RX ADMIN — SODIUM CHLORIDE, POTASSIUM CHLORIDE, SODIUM LACTATE AND CALCIUM CHLORIDE: 600; 310; 30; 20 INJECTION, SOLUTION INTRAVENOUS at 07:34

## 2020-02-04 RX ADMIN — FENTANYL CITRATE 50 MCG: 50 INJECTION, SOLUTION INTRAMUSCULAR; INTRAVENOUS at 07:37

## 2020-02-04 RX ADMIN — PHENYLEPHRINE HYDROCHLORIDE 50 MCG: 10 INJECTION INTRAVENOUS at 08:35

## 2020-02-04 RX ADMIN — PHENYLEPHRINE HYDROCHLORIDE 50 MCG: 10 INJECTION INTRAVENOUS at 08:43

## 2020-02-04 RX ADMIN — CEFAZOLIN 1 G: 1 INJECTION, POWDER, FOR SOLUTION INTRAMUSCULAR; INTRAVENOUS at 12:13

## 2020-02-04 ASSESSMENT — ENCOUNTER SYMPTOMS: DYSRHYTHMIAS: 1

## 2020-02-04 ASSESSMENT — MIFFLIN-ST. JEOR: SCORE: 1048.34

## 2020-02-04 ASSESSMENT — LIFESTYLE VARIABLES: TOBACCO_USE: 0

## 2020-02-04 ASSESSMENT — ACTIVITIES OF DAILY LIVING (ADL)
ADLS_ACUITY_SCORE: 15
ADLS_ACUITY_SCORE: 13

## 2020-02-04 ASSESSMENT — COPD QUESTIONNAIRES: COPD: 0

## 2020-02-04 NOTE — OP NOTE
Procedure Date: 02/04/2020      PREOPERATIVE DIAGNOSIS:  Large epidural mass on the right L5-S1, probable synovial cyst with right L5 and S1 radiculitis.      POSTOPERATIVE DIAGNOSIS:  Large epidural mass on the right L5-S1, probable synovial cyst with right L5 and S1 radiculitis.      PROCEDURE:  L5 laminectomy bilaterally for excision of a large epidural mass, probable synovial cyst.      SURGEON:  Antwan Burkett MD.      ASSISTANT:  Estefany Yoder PA-C.      ANESTHESIA:  General with endotracheal tube.      INDICATIONS FOR PROCEDURE:  Ms. Ivan is a 79-year-old woman who has a history of progressive low back and left leg pain.  Her symptoms were refractory to conservative treatment.  Preoperative MRI scan showed a very large epidural mass on the left side compressing the L5 and S1 nerves and the central canal.  It extended all the way over to the left side causing severe spinal stenosis.  Because of the size of the cyst, it was recommended that she undergo operative treatment.  It was felt that it was likely to be a synovial cyst and initially it was recommended that she have a fusion along with the decompression procedure, but subsequent CT scan showed that her bone density was quite low and because of concerns of hardware failure or loosening, it was decided not to do the fusion procedure.      OPERATIVE PROCEDURE:  After informed consent was obtained from the patient and satisfactory general anesthesia achieved, the patient was turned to the prone position on the Hancock frame.  Care was taken to pad all bony prominences and her back was prepped and draped in a sterile fashion.  After appropriate patient and site identification, a longitudinal incision measuring 3 cm was made overlying the L5 spinous process.  Marcaine 0.25% with epinephrine was infiltrated into the wound edges to aid with hemostasis and postoperative pain control.  Subcutaneous dissection continued with the Bovie down to lumbar  fascia.  Lumbar fascia was divided over the L5 spinous process.  A Kocher clamp was placed in the spinous process and a lateral x-ray obtained confirming the position at L5.  Dissection continued subperiosteally on the spinous processes and lamina of L5, the inferior edge of L4 and superior edge of S1 were also exposed.  Fitch retractor was placed into the wound.  Leksell rongeur was used to remove the spinous process of L5 and the Midas drill was used to thin the lamina on the left side.  Because of the cyst was on the right and the dura and nerve roots were packed up tightly against the anterior lamina, the cyst was approached from the left first.  The lamina was thinned with the Midas drill and then exposed with an angled curet and removed with Kerrison rongeurs on the left side.  There was quite a significant amount of scar tissue on the left as well and teasing this off of the dura a small dural tear was propagated.  The tear was through the dura, but not through the arachnoid.  Therefore, no CSF was lost.  This was repaired with a running locked suture of 6-0 Prolene.  It was felt to be a watertight closure.        The dissection continued under the lamina working from the left to the right side, dissecting scar tissue off of the dura progressively and removing the lamina and ligamentum flavum at both the L5-S1 and L4-L5 levels.  There was a marked amount of scar tissue encountered along the L5 nerve, the lateral dura, and the S1 nerve on the right side.  In gradually teasing this scar tissue off of the lamina, the contents of the cyst came out.  It appeared quite necrotic.  It was gelatinous, it was not filled with synovial fluid.  All this tissue was sent for permanent section.  The scar tissue was peeled off of the dura and S1 and L5 nerves.  This did appear to have a capsular nature and this was also sent to pathology.        The back of the disk was inspected.  There were no tears in the annulus.   Following decompression, a Wong elevator could be passed along the path of the S1 and L5 nerves and there was no evidence of additional tethering material.  The dural repair was inspected.  It was not leaking CSF.  There was excellent dural turgor.  The wound was irrigated with copious amounts of antibiotic solution.  The cyst and capsule were quite vascular.  Hemostasis was achieved with bipolar cautery and with Surgiflo.        At the end of the procedure, there was no significant bleeding.  The wound was irrigated with copious amounts of antibiotic solution.  A piece of Duragen was placed over the dural repair and then that covered with thrombin-soaked Gelfoam.  A 15-Slovenian channel drain was placed in the epidural space and brought out to the right of the incision.  Lumbar fascia was then reapproximated with multiple figure-of-eight sutures of #1 Vicryl.  The skin closed with multiple subcutaneous sutures of 2-0 Vicryl and a running subcuticular stitch of 3-0 Vicryl.  Sterile Steri-Strips and dressing were applied.  The drain was hooked to a HOWARD bulb.  The patient was turned from the Hancock frame, awakened, extubated, and left the operating room in good condition.        ESTIMATED BLOOD LOSS:  300 mL.        REPLACEMENT:  1400 mL of crystalloid.       COMPLICATIONS:  There were no complications.        Total time for the procedure from skin to skin was 100 minutes.       CINDI Monae's assistance was essential during the entire procedure for safe retraction of muscle and neural tissue, suctioning of blood for visualization.    JEREMIAH HILLS MD             D: 2020   T: 2020   MT: NATHAN      Name:     RAMON MESA   MRN:      [id number]        Account:        [id number]   :      1940           Procedure Date: 2020      Document: [id number]

## 2020-02-04 NOTE — BRIEF OP NOTE
[facility]    Brief Operative Note    Pre-operative diagnosis: Large epidural mass on the right at L5-S1, probable synovial cyst with a right L5 and S1 radiculitis.  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]  Post-operative diagnosis Same as pre-operative diagnosis    Procedure: Procedure(s):  LUMBAR 5 LAMINECTOMY BILATERALLY FOR EXCISION OF A LARGE SYNOVIAL CYST  Surgeon: Surgeon(s) and Role:     * Antwan Burkett MD - Primary     * Estefany Redman PA-C - Assisting  Anesthesia: General   Estimated blood loss: 300 mL  Drains: Hardy-Day  Specimens:   ID Type Source Tests Collected by Time Destination   A : SOFT TISSUE MASS Tissue Spine, Lumbar SURGICAL PATHOLOGY EXAM Antwan Burkett MD 2/4/2020  9:56 AM      Findings:   Large epidural mass on the right at L5-S1, hyperemic with marked scaring to the dura, L5 and S1 nerves..  Complications: None.  Implants:   Implant Name Type Inv. Item Serial No.  Lot No. LRB No. Used   GRAFT DURAGEN 1X1&quot; [id number] Bone/Tissue/Biologic GRAFT DURAGEN 1X1&quot; [id number]  [employer] [id number] N/A 1

## 2020-02-04 NOTE — PROVIDER NOTIFICATION
Call placed to Dr. Burkett regarding double vision post operative, pt states that she has episodes of double vision upon awakening and with migraine auras. RRT called prior to call back from Dr. Burkett, pt seen by house provider. Dr. Burkett updated upon call back.

## 2020-02-04 NOTE — ANESTHESIA CARE TRANSFER NOTE
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    Anesthesia Type:   General, ETT     Note:  Airway :Face Mask  Patient transferred to:PACU        Vitals: (Last set prior to Anesthesia Care Transfer)    CRNA VITALS  2/4/2020 1036 - 2/4/2020 1112      2/4/2020             Pulse:  87    SpO2:  100 %    Resp Rate (observed):  (!) 1    Resp Rate (set):  10                Electronically Signed By: King Rueda  February 4, 2020  11:12 AM

## 2020-02-04 NOTE — PROGRESS NOTES
Medication History Completed by Medication Scribe  Admission medication history interview status for the 2/4/2020  admission is complete. See EPIC admission navigator for prior to admission medications     Medication history sources: Patient, Patient's family/friend (Sister), H&P and Pharmacy ([facility], [facility])  Medication history source reliability: Moderate  Adherence assessment: N/A Not Observed    Significant changes made to the medication list:  Patient taking meds differently than prescribed; See PTA entries for: diclofenac      Additional medication history information:   Verified medications with Pharmacy : diclofenac    Medication reconciliation completed by provider prior to medication history? No    Time spent in this activity: 60 minutes      Prior to Admission medications    Medication Sig Last Dose Taking? Auth Provider   ALPRAZolam (XANAX PO) Take 0.125-0.25 mg by mouth daily as needed for anxiety Over 1 year ago at prn Yes Reported, Patient   aspirin-acetaminophen-caffeine (EXCEDRIN EXTRA STRENGTH) 250-250-65 MG per tablet Take 1 tablet by mouth every 8 hours as needed  Over 1 week ago at prn Yes Reported, Patient   DICLOFENAC PO Take 1 tablet by mouth daily as needed Filled with Phill 2017. Unable to confirm dose. 2/4/2020 at 0200 Yes Reported, Patient   DULoxetine (CYMBALTA) 30 MG capsule Take 30 mg by mouth daily (in addition to the 60 mg dose to = 90 mg dose) 2/3/2020 at am Yes Reported, Patient   DULoxetine (CYMBALTA) 60 MG capsule Take 60 mg by mouth daily (In addition to the 30 mg dose to = 90 mg dose) 2/3/2020 at am Yes Reported, Patient   HYDROcodone-acetaminophen (NORCO) 5-325 MG tablet Take 1 tablet by mouth every 6 hours as needed for severe pain 2/4/2020 at 0200 Yes Reported, Patient   ipratropium (ATROVENT) 0.06 % nasal spray Spray 2 sprays into both nostrils daily as needed for rhinitis Over 1 week ago at prn Yes Reported, Patient   Omeprazole (PRILOSEC PO) Take 20 mg by  mouth daily  2/3/2020 at am Yes Reported, Patient   polyethylene glycol 0.4%- propylene glycol 0.3% (SYSTANE ULTRA) 0.4-0.3 % SOLN ophthalmic solution Place 1 drop into both eyes daily as needed for dry eyes OVer 1 week ago at prn Yes Reported, Patient   simvastatin (ZOCOR) 40 MG tablet Take 40 mg by mouth daily  2/3/2020 at am Yes Reported, Patient

## 2020-02-04 NOTE — ANESTHESIA PREPROCEDURE EVALUATION
Anesthesia Pre-Procedure Evaluation    Patient: Elzbieta Ivan   MRN: [id number] : 1940          Preoperative Diagnosis: Lumbar radiculopathy [M54.16]  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]    Procedure(s):  LUMBAR 5 LAMINECTOMY EXCISION OF SYNOVIAL CYST    Past Medical History:   Diagnosis Date     Anxiety      Arrhythmia     hx of SVT     Arthritis      Dysphagia      Fibromyalgia      Gastroesophageal reflux disease      Hashimoto's disease      Hyperlipidemia      Malignant neoplasm of cervix (H)      Migraine      Non-toxic nodular goiter      Osteoporosis      Polyp of vagina      Sleep apnea      Past Surgical History:   Procedure Laterality Date     APPENDECTOMY        SECTION       EYE SURGERY      Bilat blephplsty     GYN SURGERY      hysterectomy with single oophrectomy     ORTHOPEDIC SURGERY      knee arthroscopy     PUNCTURE ASPIRATION ABSCESS/HEMATOMA/CYST       REVERSE ARTHROPLASTY SHOULDER Left 2018    Procedure: REVERSE ARTHROPLASTY SHOULDER;  LEFT REVERSE TOTAL SHOULDER ARTHROPLASTY;  Surgeon: Con Tucker MD;  Location: [facility]     TONSILLECTOMY         Anesthesia Evaluation     . Pt has had prior anesthetic. Type: General    History of anesthetic complications    hallucinations      ROS/MED HX    ENT/Pulmonary:     (+)sleep apnea, doesn't use CPAP , . .   (-) tobacco use, asthma and COPD   Neurologic:     (+)migraines,     Cardiovascular: Comment: H/O SVT. Last episode 1 year back    (+) Dyslipidemia, ----. : . . . :. dysrhythmias Other, .      (-) CAD   METS/Exercise Tolerance:     Hematologic:         Musculoskeletal: Comment: fibromyalgia  (+) arthritis,  -       GI/Hepatic: Comment: dysphagia    (+) GERD Asymptomatic on medication,      (-) liver disease   Renal/Genitourinary:      (-) renal disease   Endo:     (+) thyroid problem  Thyroid disease - Other, .      Psychiatric:     (+) psychiatric history anxiety and depression      Infectious Disease:     "     Malignancy:         Other:                          Physical Exam  Normal systems: pulmonary    Airway   Mallampati: II  TM distance: >3 FB  Neck ROM: full    Dental   (+) upper dentures and implants    Cardiovascular   Rhythm and rate: regular      Pulmonary             Lab Results   Component Value Date    HGB 12.5 02/04/2020     08/30/2018    POTASSIUM 4.1 08/30/2018    CHLORIDE 105 08/30/2018    CO2 27 08/30/2018    BUN 17 08/30/2018    CR 0.85 08/30/2018    GLC 86 08/30/2018    KALANI 8.7 08/30/2018    INR 0.96 08/30/2018       Preop Vitals  BP Readings from Last 3 Encounters:   02/04/20 139/71   09/01/18 148/77    Pulse Readings from Last 3 Encounters:   09/01/18 96      Resp Readings from Last 3 Encounters:   02/04/20 18   09/01/18 14    SpO2 Readings from Last 3 Encounters:   02/04/20 97%   09/01/18 95%      Temp Readings from Last 1 Encounters:   02/04/20 36.8  C (98.2  F) (Temporal)    Ht Readings from Last 1 Encounters:   02/04/20 1.549 m (5' 1\")      Wt Readings from Last 1 Encounters:   02/04/20 62.6 kg (138 lb)    Estimated body mass index is 26.07 kg/m  as calculated from the following:    Height as of this encounter: 1.549 m (5' 1\").    Weight as of this encounter: 62.6 kg (138 lb).       Anesthesia Plan      History & Physical Review  History and physical reviewed and following examination; no interval change.background propofol gtt    ASA Status:  2 .    NPO Status:  > 8 hours    Plan for General and ETT with Intravenous and Propofol induction. Maintenance will be Balanced.    PONV prophylaxis:  Ondansetron (or other 5HT-3)       Postoperative Care  Postoperative pain management:  Multi-modal analgesia.      Consents  Anesthetic plan, risks, benefits and alternatives discussed with:  Patient..                 Jennifer Lipscomb MD  "

## 2020-02-04 NOTE — CODE/RAPID RESPONSE
[facility]    RRT Note  2/4/2020   Time Called: 1553    Code Status: Full Code    I was called to evaluate Elzbieta Ivan for postoperative diplopia, who is a 79 year old female who was admitted on 2/4/2020 for elective bilateral L5 laminectomy and excision of large synovial cyst. PMH includes GIGI, CPAP noncompliant, migraines, history of SVT with one episode, dyslipidemia, fibromyalgia, dysphasia, GERD, hypothyroidism.   -Continue home levothyroxine, anxiety/depression..    Assessment & Plan     Postoperative bilateral monocular diplopia: On initial exam, diplopia is found to be BILATERAL monocular diplopia and persists with one eye covered. Of note - at this time, the patient remembers migraine symptoms similar to this, but feels that it was not this severe, as well as she notes that she has some element of diplopia every morning which resolves on its own.  She wears trifocal glasses, vision is otherwise grossly intact. She can easily read a clock on the other side of the room.    Neurological assessment is intact, sensation intact, she is alert and oriented, speech is clear and conversant, EOMs intact, motor exam strong and equal, no ataxia noted.  BP 80s-90s/50-60s which is the patient's baseline, rate 80s, oxygen saturation 95% on room air.  She has no other wise distress, lungs are clear, skin warm and dry, pain well controlled, no postoperative nausea vomiting, no scopolamine patch applied.     Re-Exam  I reexamined the patient approximately 30 minutes after the initial exam, the patient's diplopia had transitioned to binocular, then resolved all together (although she reported seeing more than 2 hands on the clock, but only 1 clock). Given the remainder of her intact neurological exam, and underlying migraine and diplopia history-we will wait and see if this resolves without aggressive intervention.  --Neuro is every 4 hours  --Consider ophthalmology exam if diplopia reverts to  monocular  --If diplopia worsens, or does not resolve or if other neurological deficits present, consider neurology consult    Discussed with the primary service, Dr. Burkett with spine surgery.  He is in agreement with the above plan.  The patient's sister who is at the bedside was also updated and in agreement with the above plan.    INTERVENTIONS:  -Glucose WNL  -Vitals at baseline    Discussed with and defer further cares to Spine Surgery, [facility]  Hospitalist Service - House L[facility]  Pager: [phone] (7a - 6p)      Physical Exam   Vital Signs with Ranges:  Temp:  [96.8  F (36  C)-98.2  F (36.8  C)] 97.9  F (36.6  C)  Pulse:  [55-87] 80  Heart Rate:  [67-78] 77  Resp:  [12-20] 18  BP: ()/(43-80) 104/51  SpO2:  [91 %-100 %] 95 %  I/O last 3 completed shifts:  In: 1500 [I.V.:1500]  Out: 300 [Blood:300]    Physical Exam     Constitutional: Awake, alert, cooperative, no apparent distress.  Eyes: Conjunctiva and pupils examined and normal.  HEENT: Moist mucous membranes, normal dentition.  Respiratory: Clear to auscultation bilaterally, no crackles or wheezing.  Cardiovascular: Regular rate and rhythm, normal S1 and S2, and no murmur noted.  GI: Soft, non-distended, non-tender  Skin: Warm and dry, incision covered, not visualized  Neurologic: Cranial nerves 2-12 intact, normal strength and sensation with the exception of diplopia noted above.  Psychiatric: Alert, oriented to person, place and time,  Mild anxiety noted  Data     IMAGING: (X-ray/CT/MRI)   Recent Results (from the past 24 hour(s))   XR Lumbar Spine Port 1 View    Narrative    LUMBAR SPINE PORTABLE ONE VIEW   2/4/2020 8:30 AM     HISTORY: Lumbar laminectomy.    COMPARISON: None.      Impression    IMPRESSION: A clamp is seen posteriorly at the lumbosacral junction.  Multilevel degenerative disc disease. Aortic calcifications.    ALEYDA GATES MD       CBC with Diff:  Recent Labs   Lab Test 02/04/20  0623  08/30/18  1236    HGB 12.5   < > 11.6*   INR  --   --  0.96    < > = values in this interval not displayed.     INR:    Recent Labs   Lab Test 08/30/18  1236   INR 0.96     Time Spent on this Encounter   I spent 30 minutes on the unit/floor managing the care of Elzbieta Ivan. Over 50% of my time was spent counseling the patient and/or coordinating care regarding services listed in this note.

## 2020-02-04 NOTE — PROGRESS NOTES
Patient Elzbieta Ivan is in stable condition and has met criteria for PACU discharge.  CHAYO Lipscomb was  informed and a sign out was given for Unit/Station transfer.

## 2020-02-04 NOTE — PROGRESS NOTES
Telephone report was given to Station 77 RN.  Patient will be transported to Room. 10-1 via cart accompanied by NA.  Belongings: hospital bag. Family : sister in [place]

## 2020-02-04 NOTE — ANESTHESIA POSTPROCEDURE EVALUATION
Patient: Elzbieta Ivan    Procedure(s):  LUMBAR 5 LAMINECTOMY BILATERALLY FOR EXCISION OF A LARGE SYNOVIAL CYST    Diagnosis:Lumbar radiculopathy [M54.16]  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]  Diagnosis Additional Information: No value filed.    Anesthesia Type:  General, ETT    Note:  Anesthesia Post Evaluation    Patient location during evaluation: PACU  Patient participation: Able to fully participate in evaluation  Level of consciousness: awake and alert  Pain management: adequate  Airway patency: patent  Cardiovascular status: acceptable  Respiratory status: acceptable and unassisted  Hydration status: acceptable  PONV: none             Last vitals:  Vitals:    02/04/20 1250 02/04/20 1300 02/04/20 1330   BP: 122/67 122/69 100/57   Pulse: 74 76 75   Resp: 13 14 20   Temp:      SpO2: 93% 93% 98%         Electronically Signed By: Jennifer Lipscomb MD  February 4, 2020  1:46 PM

## 2020-02-05 ENCOUNTER — DOCUMENTATION ONLY (OUTPATIENT)
Dept: OTHER | Facility: CLINIC | Age: 80
End: 2020-02-05

## 2020-02-05 ENCOUNTER — APPOINTMENT (OUTPATIENT)
Dept: OCCUPATIONAL THERAPY | Facility: CLINIC | Age: 80
DRG: 029 | End: 2020-02-05
Attending: ORTHOPAEDIC SURGERY
Payer: COMMERCIAL

## 2020-02-05 ENCOUNTER — APPOINTMENT (OUTPATIENT)
Dept: PHYSICAL THERAPY | Facility: CLINIC | Age: 80
DRG: 029 | End: 2020-02-05
Attending: ORTHOPAEDIC SURGERY
Payer: COMMERCIAL

## 2020-02-05 VITALS
SYSTOLIC BLOOD PRESSURE: 90 MMHG | HEIGHT: 61 IN | BODY MASS INDEX: 26.06 KG/M2 | HEART RATE: 77 BPM | TEMPERATURE: 98.1 F | DIASTOLIC BLOOD PRESSURE: 51 MMHG | WEIGHT: 138 LBS | RESPIRATION RATE: 16 BRPM | OXYGEN SATURATION: 92 %

## 2020-02-05 LAB
COPATH REPORT: NORMAL
GLUCOSE BLDC GLUCOMTR-MCNC: 72 MG/DL (ref 70–99)

## 2020-02-05 PROCEDURE — 25000128 H RX IP 250 OP 636: Performed by: ORTHOPAEDIC SURGERY

## 2020-02-05 PROCEDURE — 00000146 ZZHCL STATISTIC GLUCOSE BY METER IP

## 2020-02-05 PROCEDURE — 97535 SELF CARE MNGMENT TRAINING: CPT | Mod: GO

## 2020-02-05 PROCEDURE — 25000132 ZZH RX MED GY IP 250 OP 250 PS 637: Performed by: ORTHOPAEDIC SURGERY

## 2020-02-05 PROCEDURE — 97116 GAIT TRAINING THERAPY: CPT | Mod: GP

## 2020-02-05 PROCEDURE — 97165 OT EVAL LOW COMPLEX 30 MIN: CPT | Mod: GO

## 2020-02-05 PROCEDURE — 97530 THERAPEUTIC ACTIVITIES: CPT | Mod: GP

## 2020-02-05 PROCEDURE — 97161 PT EVAL LOW COMPLEX 20 MIN: CPT | Mod: GP

## 2020-02-05 RX ORDER — BISACODYL 10 MG
10 SUPPOSITORY, RECTAL RECTAL DAILY PRN
Status: DISCONTINUED | OUTPATIENT
Start: 2020-02-05 | End: 2020-02-05 | Stop reason: HOSPADM

## 2020-02-05 RX ORDER — HYDROCODONE BITARTRATE AND ACETAMINOPHEN 5; 325 MG/1; MG/1
1 TABLET ORAL EVERY 6 HOURS PRN
Qty: 10 TABLET | Refills: 0 | Status: SHIPPED | OUTPATIENT
Start: 2020-02-05 | End: 2020-02-15

## 2020-02-05 RX ADMIN — CEFAZOLIN 1 G: 1 INJECTION, POWDER, FOR SOLUTION INTRAMUSCULAR; INTRAVENOUS at 04:42

## 2020-02-05 RX ADMIN — OXYCODONE HYDROCHLORIDE 10 MG: 5 TABLET ORAL at 02:22

## 2020-02-05 RX ADMIN — ACETAMINOPHEN 975 MG: 325 TABLET, FILM COATED ORAL at 13:49

## 2020-02-05 RX ADMIN — OMEPRAZOLE 20 MG: 20 CAPSULE, DELAYED RELEASE ORAL at 06:22

## 2020-02-05 RX ADMIN — SENNOSIDES AND DOCUSATE SODIUM 1 TABLET: 8.6; 5 TABLET ORAL at 09:58

## 2020-02-05 RX ADMIN — OXYCODONE HYDROCHLORIDE 5 MG: 5 TABLET ORAL at 15:28

## 2020-02-05 RX ADMIN — OXYCODONE HYDROCHLORIDE 10 MG: 5 TABLET ORAL at 06:22

## 2020-02-05 RX ADMIN — Medication 5000 UNITS: at 09:58

## 2020-02-05 RX ADMIN — DULOXETINE HYDROCHLORIDE 90 MG: 30 CAPSULE, DELAYED RELEASE ORAL at 09:58

## 2020-02-05 RX ADMIN — OXYCODONE HYDROCHLORIDE 5 MG: 5 TABLET ORAL at 10:34

## 2020-02-05 RX ADMIN — ACETAMINOPHEN 975 MG: 325 TABLET, FILM COATED ORAL at 06:22

## 2020-02-05 ASSESSMENT — ACTIVITIES OF DAILY LIVING (ADL)
ADLS_ACUITY_SCORE: 13
ADLS_ACUITY_SCORE: 14
ADLS_ACUITY_SCORE: 13
ADLS_ACUITY_SCORE: 16
PREVIOUS_RESPONSIBILITIES: MEAL PREP;HOUSEKEEPING;LAUNDRY;SHOPPING;MEDICATION MANAGEMENT;FINANCES;DRIVING
ADLS_ACUITY_SCORE: 16

## 2020-02-05 NOTE — PLAN OF CARE
Low BP, stable. No dizziness. On 1 LPM oxygen via NC. Low back/incision pain rated 0-1 initially, with bed mobility pain level increased to 5/10. Received Tylenol and oxycodone. Hypoactive BS, tolerating clear liquids; concerns raised by sister regarding past swallowing difficulties but she and pt both agree that speech therapy is not needed at this time as no recent changes. Bedrest per orders. Dressing CDI, HOWARD emptied of 80 mL bright bloody drainage. No N/T. Fair equal strength. Please see separate note regarding initial double vision, now resolving. Scoring green on aggression screening tool. Anxious at times. Sister at bedside post op.

## 2020-02-05 NOTE — DISCHARGE INSTRUCTIONS
INSTRUCTIONS FOR LUMBAR SURGICAL PATIENTS  Antwan Burkett M.D.--[facility]    POST-OPERATIVE INSTRUCTIONS (AFTER SURGERY):    INCISION/WOUND CARE:                1.   If you are discharged from the hospital with a dressing over your incision you may remove and replace it 2 days after leaving the hospital.     2.   If you have sutures that dissolve, the incision must be covered when showering for 5 days after surgery. On the 6th day, you may take a shower normally without covering the wound. But do not scrub the wound.     3.  The small pieces of tape over your incision are called Steri-strips, they can be   removed when they are loose or after 2 weeks.                         4.    If you have staples, your incision must remain dry and covered until they are removed 2 weeks after your surgery. Please call [facility] at [phone] to make an appointment to have these removed when you have returned home from the hospital.    5.   Please look at your incisions daily and call [facility] at [phone] immediately if you notice any redness, swelling, drainage, or if you develop a fever greater than 101. If after hours or weekends call [phone] and speak to the on-call physician.    6.  Avoid wearing tight clothing over your incisions.    7. Absolutely no bathtubs, hot tubs, swimming in pools or lakes, or any submersion/soaking before the incision is completely healed (no open areas or scabs are present).    POST OPERATIVE ACTIVITY LIMITATIONS:    1. No lifting over 10 pounds (gallon of milk) above your chest or below your waist.     2. Avoid repetitive twisting or bending i.e. raking, sweeping, shoveling etc.    3. Walking stimulates the healing process. Dr. Burkett wants you to accomplish a minimum of 45 minutes of sustained walking per day for exercise. You are encouraged to walk several times a day and there is no limit on how far you can walk. In the beginning you may only be able to walk 5-15  minutes at a time that is okay just do this a minimum of 4-10x/day.  4. You may remove your white stockings (ria hose) for   hour twice a day. You may discontinue wearing the stockings when you are not spending any time in bed during the day and are walking at least hourly.    5. You should do the Adherent Nerve Root Stretch exercise 4-5 times a day; please see the attached instruction sheet for how to do these.    6. You may begin driving again when you are no longer taking the narcotic pain medication and feel comfortable with being able to navigate amongst other drivers.    7. You may return to work when you are no longer taking the narcotic pain medication. You must observe the 10-pound lifting restriction (nothing below waist or above chest), frequent change of position from sit, stand, and walking and avoid repetitive bending and twisting.    8. Advancement of physical activities will be discussed at each follow up appointment with Dr. Burkett. Physical therapy, if needed, will also be discussed at each appointment.    9. Gentle sexual activity is okay. Be a passive participant.    POST OPERATIVE MEDICATIONS:    1. If your surgery INCLUDED A FUSION  DO NOT take Ibuprofen, Motrin, Advil, Aleve or any other anti-inflammatory medication or aspirin products for 3 months after surgery. This also includes any medication taken for chronic inflammatory conditions i.e. Methotrexate, Remicaid, Prednisone etc. unless otherwise instructed. Only Tylenol or Tylenol based medications are okay during this time frame.    2. If your surgery DID NOT include a fusion you may resume any over-the-counter     anti-inflammatories (Advil, Ibuprofen, Naproxen etc.) 3-5 days after the surgery.    3. We recommend 3-8 ounce glasses of milk daily and a daily supplement of Vitamin D 2000 i.u./day for fusion healing and bone growth.    4. Poor nutrition can lead to delayed wound healing and constipation. Good sources of lean proteins and fresh  fruits and vegetables will help with this.     5. Narcotic pain medications will also cause constipation. Using over the counter stool softeners, drinking plenty of fluids, ambulation, and good nutrition can help prevent this from occurring.   If you have any questions regarding these instructions please contact   [facility] at [phone].          Occupational therapy saw you in the hospital and recommended that you continue with outpatient OT after discharge. Please call Dr. Burkett's office tomorrow Thursday 2/6/19 to get therapy orders if Dr. Burkett would like you to follow this recommendation.    Please use your walker for all ambulation until your balance improves. Remember to move slowly and deliberately, standing up slowly from your chair or bed. Please use handrails on stairs.

## 2020-02-05 NOTE — PROGRESS NOTES
History:   Minimal complaints.  Pain controlled on oral meds.  Had an episode of double vision yesterday which she apparently has when she gets up in the morning from time to time, now resolved.  Pre-op right leg symptoms gone.  Tolerating liquid diet without difficulty.  Ambulating in the room.  Denies headache  Vitals:   B/P: 97/47, T: 98.2, P: 77, R: 14       Lab Results   Component Value Date     08/30/2018    Lab Results   Component Value Date    CHLORIDE 105 08/30/2018    Lab Results   Component Value Date    BUN 17 08/30/2018      Lab Results   Component Value Date    POTASSIUM 4.1 08/30/2018    Lab Results   Component Value Date    CO2 27 08/30/2018    Lab Results   Component Value Date    CR 0.85 08/30/2018        Lab Results   Component Value Date    HGB 12.5 02/04/2020    HGB 10.0 (L) 09/01/2018    HGB 9.8 (L) 08/31/2018         Intake/Output Summary (Last 24 hours) at 2/5/2020 0751  Last data filed at 2/5/2020 0600  Gross per 24 hour   Intake 1860 ml   Output 1068 ml   Net 792 ml      Results for orders placed or performed during the hospital encounter of 02/04/20 (from the past 24 hour(s))   XR Lumbar Spine Port 1 View    Narrative    LUMBAR SPINE PORTABLE ONE VIEW   2/4/2020 8:30 AM     HISTORY: Lumbar laminectomy.    COMPARISON: None.      Impression    IMPRESSION: A clamp is seen posteriorly at the lumbosacral junction.  Multilevel degenerative disc disease. Aortic calcifications.    ALEYDA GATES MD   Glucose by meter   Result Value Ref Range    Glucose 85 70 - 99 mg/dL   Glucose by meter   Result Value Ref Range    Glucose 72 70 - 99 mg/dL     Hemovac output: 20 cc since midnight.  Dressing:   Dry and intact.   Neuro:   Motor: 5/5   Sensation: intact  Abdomen:  Soft and nontender bowel sounds active by report  Extremities:   No swelling or calf tenderness Homans negative  A/P:   Stable POD # 1   Remove HOWARD and change dressing.  Home today.  Discharge summary dictated.

## 2020-02-05 NOTE — PROGRESS NOTES
02/05/20 1100   Quick Adds   Type of Visit Initial PT Evaluation   Living Environment   Lives With alone   Living Arrangements condominium   Home Accessibility stairs to enter home   Number of Stairs, Main Entrance other (see comments)  (total 20)   Stair Railings, Main Entrance railing on left side (ascending);railing on right side (ascending)   Transportation Anticipated family or friend will provide   Living Environment Comment Pt lives alone in a condo with [address] and has 1 rail support.    Self-Care   Usual Activity Tolerance good   Current Activity Tolerance moderate   Regular Exercise No   Equipment Currently Used at Home none   Activity/Exercise/Self-Care Comment Pt was ind with all ADL's and IADL's   Functional Level Prior   Ambulation 0-->independent   Transferring 0-->independent   Toileting 0-->independent   Bathing 0-->independent   Communication 0-->understands/communicates without difficulty   Swallowing 0-->swallows foods/liquids without difficulty   Cognition 1 - attention or memory deficits   Fall history within last six months no   Which of the above functional risks had a recent onset or change? ambulation   Prior Functional Level Comment Pt was ind with ambulation.   General Information   Onset of Illness/Injury or Date of Surgery - Date 02/04/20   Referring Physician Antwan Burkett MD   Patient/Family Goals Statement Go home   Pertinent History of Current Problem (include personal factors and/or comorbidities that impact the POC) Pt is a 79 yr old female admitted on 2/4/2020 for elective bilateral L5 laminectomy and excision of large synovial cyst.    Precautions/Limitations fall precautions;spinal precautions   Weight-Bearing Status - LLE full weight-bearing   Weight-Bearing Status - RLE full weight-bearing   General Observations Impulsive, Impaired safety awareness   General Info Comments Activity: Ambulate, ankle pumps, quad sets   Cognitive Status Examination   Orientation  orientation to person, place and time   Level of Consciousness alert   Follows Commands and Answers Questions 100% of the time   Personal Safety and Judgment at risk behaviors demonstrated;impulsive   Memory intact   Pain Assessment   Patient Currently in Pain Yes, see Vital Sign flowsheet  (4/10 pain in the back with ambualtion)   Range of Motion (ROM)   ROM Comment BLE: WFL   Strength   Strength Comments BLE: 4+/5   Bed Mobility   Bed Mobility Comments Supine<>sit: mod I   Transfer Skills   Transfer Comments STS at SBA   Gait   Gait Comments 30 ft using RW and CGA   Balance   Balance Comments Sitting: Good   Sensory Examination   Sensory Perception no deficits were identified   Coordination   Coordination no deficits were identified   Muscle Tone   Muscle Tone no deficits were identified   Modality Interventions   Planned Modality Interventions Cryotherapy   General Therapy Interventions   Planned Therapy Interventions balance training;bed mobility training;gait training;strengthening;transfer training;risk factor education;home program guidelines;progressive activity/exercise   Clinical Impression   Criteria for Skilled Therapeutic Intervention yes, treatment indicated   PT Diagnosis Impaired gait   Influenced by the following impairments decreased activity tolernace and balance   Functional limitations due to impairments assistance needed with mobility   Clinical Presentation Stable/Uncomplicated   Clinical Presentation Rationale clinical judgement, [facility]   Clinical Decision Making (Complexity) Low complexity   Therapy Frequency 2x/day   Predicted Duration of Therapy Intervention (days/wks) 5   Anticipated Discharge Disposition Home with Assist   Risk & Benefits of therapy have been explained Yes   Patient, Family & other staff in agreement with plan of care Yes   Clinical Impression Comments Pt noted with impaired safety awareness and strength limtiing indpendence with fucntional mobility. Pt will benefit from  "continued skilled PT to achieve pLOF.    [facility] AM-PAC  \"6 Clicks\" V.2 Basic Mobility Inpatient Short Form   1. Turning from your back to your side while in a flat bed without using bedrails? 4 - None   2. Moving from lying on your back to sitting on the side of a flat bed without using bedrails? 4 - None   3. Moving to and from a bed to a chair (including a wheelchair)? 3 - A Little   4. Standing up from a chair using your arms (e.g., wheelchair, or bedside chair)? 3 - A Little   5. To walk in hospital room? 3 - A Little   6. Climbing 3-5 steps with a railing? 3 - A Little   Basic Mobility Raw Score (Score out of 24.Lower scores equate to lower levels of function) 20   Total Evaluation Time   Total Evaluation Time (Minutes) 12     "

## 2020-02-05 NOTE — PLAN OF CARE
"OT: Evaluation and treatment initiated. Pt lives independently in a condo. Prior pt independent in all I/ADLs.   Discharge Planner OT   Patient plan for discharge: Home with assist from sister  Current status:  While seated/standing pt completed LE dressing with minimum assist and AE. During session, pt demonstrated decreased safety awareness during functional ADLs and transfers. Completed the [place] Cognitive Screen to assess cognitive skills and the implications on I/ADLs. Pt scored 24/30 indicating cognitive impairment. Pt demonstrated difficulty with delayed recall and executive function. Further cognitive testing warranted.   Barriers to return to prior living situation: current level of assist; decreased safety awareness   Recommendations for discharge: Home with 24/7 A/supervision for all I/ADLs (driving, medications, dressing, bathing etc) and OP OT (sister reported that she will provide transportation)  Rationale for recommendations: Sister reported that she will be staying with patient \"for as long as needed\" and can assist in I/ADLs. Continued OT to address cognition, and safety and independence in I/ADLs.        Entered by: Marielena Moreno 02/05/2020 3:56 PM      "

## 2020-02-05 NOTE — DISCHARGE SUMMARY
Admit Date:     02/04/2020   Discharge Date:           DIAGNOSIS:  Epidural mass on the right at the L5-S1 level with right L5 and S1 radiculitis.      PROCEDURE THIS ADMISSION:  L5 laminectomy, excision of epidural mass, probable synovial cyst.      HISTORY OF PRESENT ILLNESS:  Ms. Ivan is a 79-year-old woman who has a several month history of low back and right leg pain.  Her symptoms were progressive.  A preoperative MRI scan revealed a large epidural mass on the right at the L5-S1 level causing severe spinal canal compression that was thought to be a synovial cyst.  It was recommended that she undergo operative treatment.  Initially the plan was to do a decompression and fusion, but a subsequent CT scan showed that her bone density was so poor that there was a significant risk of failure of the instrumentation.  Therefore, discussion was undertaken regarding an uninstrumented fusion.  After the patient understood that there was a 50% healing rate with an uninstrumented fusion, she elected to have the cyst simply removed.      HOSPITAL COURSE:  On the day of admission, she was taken to the operating room where she underwent the previously mentioned procedures tolerated the procedure without difficulty and was returned to regular hospital bledsoe postoperatively.  Her postoperative course was remarkable for an episode of diplopia postoperatively.  She was evaluated by the Hospitalist Service who felt that this was not caused by an intracranial problem and it resolved uneventfully.  She made good progress in eating, voiding and ambulating.  At the time of discharge she was accomplishing these activities without difficulty.  Her preoperative right leg symptoms were completely relieved postoperatively and she had no neurologic deficits.      DISCHARGE MEDICATIONS:  She was discharged to home on postoperative day #1 with a prescription for Norco 5/325 one tablet p.o. every 6 hours for severe pain.  She was given 10  tablets.  She was instructed to resume her preoperative medications including Zocor 40 mg p.o. daily, Cymbalta 90 mg p.o. each day, Xanax 0.125-0.25 mg p.o. p.r.n., Excedrin 250/250/65 one tablet every 8 hours as needed, Prilosec 20 mg p.o. each day, propylene glycol ophthalmic solution 1 drop into both eyes daily, Atrovent 0.6% nasal spray 2 sprays into both hospitals daily and Diclofenac 1 tablet p.o. each day.      DISCHARGE INSTRUCTIONS:  She will follow up in my clinic 1 month from the time of discharge.  She was instructed to avoid lifting, bending and twisting during the followup period.         JEREMIAH HILLS MD             D: 2020   T: 2020   MT: CAYDEN      Name:     RAMON MESA   MRN:      [id number]        Account:        [id number]   :      1940           Admit Date:     2020                                  Discharge Date:       Document: [id number]       cc: Jeremiah Santiago MD

## 2020-02-05 NOTE — PROGRESS NOTES
"   02/05/20 1450   Quick Adds   Type of Visit Initial Occupational Therapy Evaluation   Living Environment   Lives With alone   Living Arrangements condominium   Home Accessibility stairs to enter home   Transportation Anticipated family or friend will provide   Living Environment Comment Sister will be staying with patient upon discharge. Sister reported \"I can stay with her as long as needed.\" Walk in shower with a built in seat.    Self-Care   Usual Activity Tolerance good   Current Activity Tolerance moderate   Regular Exercise No   Equipment Currently Used at Home walker, standard;shower chair;raised toilet  (Reacher, sock aid, shoe horn)   Functional Level   Ambulation 0-->independent   Transferring 0-->independent   Toileting 0-->independent   Bathing 0-->independent   Dressing 0-->independent   Fall history within last six months no   General Information   Onset of Illness/Injury or Date of Surgery - Date 02/04/20   Referring Physician Antwan Burkett MD   Patient/Family Goals Statement Home    Additional Occupational Profile Info/Pertinent History of Current Problem Per chart: Pt is a 79 yr old female admitted on 2/4/2020 for elective bilateral L5 laminectomy and excision of large synovial cyst.    Precautions/Limitations fall precautions;spinal precautions   Cognitive Status Examination   Orientation orientation to person, place and time   Level of Consciousness alert   Attention Distractible during evaluation   Organization/Problem Solving Problem solving impaired   Executive Function Impulsive   Sensory Examination   Sensory Quick Adds No deficits were identified   Pain Assessment   Patient Currently in Pain Yes, see Vital Sign flowsheet   Transfer Skills   Transfer Transfer Safety Analysis Bed/Chair;Transfer Skill: Stand to Sit;Transfer Safety Analysis Sit/Stand   Transfer Skill: Bed to Chair/Chair to Bed   Level of [place]: Bed to Chair contact guard   Physical Assist/Nonphysical Assist: " "Bed to Chair 1 person assist;verbal cues;set-up required   Transfer Skill: Sit to Stand   Level of [place]: Sit/Stand stand-by assist   Physical Assist/Nonphysical Assist: Sit/Stand verbal cues;set-up required   Toilet Transfer   Toilet Transfer Toilet Transfer Skill;Toilet Transfer Safety Analysis   Transfer Skill: Toilet Transfer   Level of [place]: Toilet contact guard   Physical Assist/Nonphysical Assist: Toilet 1 person assist;verbal cues;set-up required   Lower Body Dressing   Level of [place]: Dress Lower Body minimum assist (75% patients effort)   Physical Assist/Nonphysical Assist: Dress Lower Body 1 person assist;verbal cues;set-up required   Assistive Device sock-aid;reacher;long-handled shoe horn   Instrumental Activities of Daily Living (IADL)   Previous Responsibilities meal prep;housekeeping;laundry;shopping;medication management;finances;driving   General Therapy Interventions   Planned Therapy Interventions ADL retraining;IADL retraining;transfer training;cognition   Clinical Impression   Criteria for Skilled Therapeutic Interventions Met yes, treatment indicated   OT Diagnosis Decreased independence for I/ADLs    Influenced by the following impairments Decreased independence for I/ADLs    Assessment of Occupational Performance 1-3 Performance Deficits   Identified Performance Deficits Decreased independence for I/ADLs  (Dressing, bathing, toileting)   Clinical Decision Making (Complexity) Low complexity   Therapy Frequency Daily   Predicted Duration of Therapy Intervention (days/wks) 3 days    Anticipated Discharge Disposition Home with Assist;Home with Outpatient Therapy   Risks and Benefits of Treatment have been explained. Yes   Patient, Family & other staff in agreement with plan of care Yes   [facility] AM-PAC TM \"6 Clicks\"   2016, Trustees of [facility], under license to [employer].  All rights reserved.   6 Clicks Short Forms Daily Activity Inpatient Short Form " "  [facility] AM-PAC  \"6 Clicks\" Daily Activity Inpatient Short Form   1. Putting on and taking off regular lower body clothing? 3 - A Little   2. Bathing (including washing, rinsing, drying)? 3 - A Little   3. Toileting, which includes using toilet, bedpan or urinal? 3 - A Little   4. Putting on and taking off regular upper body clothing? 3 - A Little   5. Taking care of personal grooming such as brushing teeth? 3 - A Little   6. Eating meals? 4 - None   Daily Activity Raw Score (Score out of 24.Lower scores equate to lower levels of function) 19   Total Evaluation Time   Total Evaluation Time (Minutes) 10     "

## 2020-02-05 NOTE — PLAN OF CARE
Discharge Planner PT   Patient plan for discharge: Home with sister.   Current status: PT eval completed, treatment initiated. Pt is a 79 yr old female admitted on 2/4/2020 for elective bilateral L5 laminectomy and excision of large synovial cyst. Pt lives alone in a condo with [address] and has 1 rail support. Pt is ind with all ADL's, IADL's and ambulation at baseline.  Pt was received supine in bed and edu on spinal precautions, and log rolling technique. Pt is at supervision with supine<>sit with bed flat and no rail support. Pt sat at EOB independently and stood with SBA with verbal cues on proper hand placement. Pt ambulated 300 ft using RW and SBA-CGA for safety. Pt required verbal cues to maintain B hand  on RW when ambulating and to adhere with no twisting. Pt noted to be impulsive with decreased safety awareness. Pt went up/down 15 steps using 1 rail support and SBA and verbal cues for safety. Discussed safety concerns with both pt and sister. Sister agreed to stay with patient for a few days. Sister also reported that pt is usually very careful moving around in her house and is very eager to return home as as soon as possible. Sister has a RW that she can lend pt for as long as she needs it.   Barriers to return to prior living situation: Decreased safety awareness.   Recommendations for discharge: Home with assistance of sister for all mobility until independence is achieved.   Rationale for recommendations: Pt is anticipated to make progress in all functional mobility to supervision with continued skilled PT. Pt is anticipated to need CGA-SBA for all mobility upon discharge home. If sister is unable to provide this level of assistance, then pt may need a short TCU stay prior to returning home.        Entered by: Aly Foster 02/05/2020 12:39 PM

## 2020-02-06 NOTE — PLAN OF CARE
Occupational Therapy Discharge Summary    Reason for therapy discharge:    Discharged to home with outpatient therapy.    Progress towards therapy goal(s). See goals on Care Plan in [facility] electronic health record for goal details.  Goals not met.  Barriers to achieving goals:   discharge on same date as initial evaluation.    Therapy recommendation(s):    Continued therapy is recommended.  Rationale/Recommendations:  Home with 24/7 A/supervision for all I/ADLs (driving, medications, dressing, bathing etc) and OP OT (sister reported that she will provide transportation).

## 2020-02-06 NOTE — PLAN OF CARE
Alert and oriented, forgetful at times. Anxious this afternoon waiting for discharge. Up with SBA, belt, walker. Needs reminders to move slowly and carefully. Denies N/T. Drain removed and dressing changed, incision WDL. Mild to moderate pain in lower back and mild HA this afternoon, fair relief from Tylenol and oxycodone. BP low, stable. No dizziness when OOB. Voiding without difficulty. Fair appetite, advanced to regular diet for dinner. Sister at bedside at time of discharge and will stay with pt for 2-3 days after discharge to assist with ADLs and incision care. Reviewed all discharge instructions, follow up recommendations, and when to access medical attention. Dressing supplies sent home with pt. Pt and sister declined to wait for Dr. Burkett to respond to pages regarding recommendations for OP OT, they will call his office tomorrow. Pt discharged home with sister.

## 2020-02-06 NOTE — PLAN OF CARE
Physical Therapy Discharge Summary    Reason for therapy discharge:    Discharged to home.    Progress towards therapy goal(s). See goals on Care Plan in [facility] electronic health record for goal details.  Goals not met.  Barriers to achieving goals:   discharge from facility.    Therapy recommendation(s):    Home with assist for all functional mobility.

## 2022-11-28 ENCOUNTER — HOSPITAL ENCOUNTER (OUTPATIENT)
Facility: CLINIC | Age: 82
Setting detail: OBSERVATION
Discharge: NURSING, REHAB OR LONG-TERM CARE | End: 2022-12-06
Attending: EMERGENCY MEDICINE | Admitting: NURSE PRACTITIONER
Payer: COMMERCIAL

## 2022-11-28 DIAGNOSIS — F41.9 ANXIETY: ICD-10-CM

## 2022-11-28 DIAGNOSIS — F03.90 MAJOR NEUROCOGNITIVE DISORDER (H): ICD-10-CM

## 2022-11-28 DIAGNOSIS — F32.A DEPRESSION, UNSPECIFIED DEPRESSION TYPE: ICD-10-CM

## 2022-11-28 DIAGNOSIS — R45.851 SUICIDAL IDEATION: ICD-10-CM

## 2022-11-28 LAB — SARS-COV-2 RNA RESP QL NAA+PROBE: NEGATIVE

## 2022-11-28 PROCEDURE — U0005 INFEC AGEN DETEC AMPLI PROBE: HCPCS | Performed by: EMERGENCY MEDICINE

## 2022-11-28 PROCEDURE — 99220 PR INITIAL OBSERVATION CARE,LEVEL III: CPT | Performed by: NURSE PRACTITIONER

## 2022-11-28 PROCEDURE — 250N000013 HC RX MED GY IP 250 OP 250 PS 637: Performed by: NURSE PRACTITIONER

## 2022-11-28 PROCEDURE — G0378 HOSPITAL OBSERVATION PER HR: HCPCS

## 2022-11-28 PROCEDURE — C9803 HOPD COVID-19 SPEC COLLECT: HCPCS

## 2022-11-28 PROCEDURE — 90791 PSYCH DIAGNOSTIC EVALUATION: CPT

## 2022-11-28 PROCEDURE — 99285 EMERGENCY DEPT VISIT HI MDM: CPT | Mod: 25

## 2022-11-28 RX ORDER — SIMVASTATIN 40 MG
40 TABLET ORAL DAILY
Status: DISCONTINUED | OUTPATIENT
Start: 2022-11-29 | End: 2022-12-06 | Stop reason: HOSPADM

## 2022-11-28 RX ORDER — GLIPIZIDE 10 MG/1
1 TABLET ORAL DAILY PRN
Status: DISCONTINUED | OUTPATIENT
Start: 2022-11-28 | End: 2022-12-06 | Stop reason: HOSPADM

## 2022-11-28 RX ORDER — ACETAMINOPHEN 500 MG
500 TABLET ORAL EVERY 6 HOURS PRN
Status: DISCONTINUED | OUTPATIENT
Start: 2022-11-28 | End: 2022-12-06 | Stop reason: HOSPADM

## 2022-11-28 RX ORDER — DULOXETIN HYDROCHLORIDE 60 MG/1
120 CAPSULE, DELAYED RELEASE ORAL DAILY
Status: DISCONTINUED | OUTPATIENT
Start: 2022-11-29 | End: 2022-11-28

## 2022-11-28 RX ORDER — IPRATROPIUM BROMIDE 42 UG/1
2 SPRAY, METERED NASAL DAILY PRN
Status: DISCONTINUED | OUTPATIENT
Start: 2022-11-28 | End: 2022-12-06 | Stop reason: HOSPADM

## 2022-11-28 RX ORDER — MIRTAZAPINE 7.5 MG/1
7.5 TABLET, FILM COATED ORAL AT BEDTIME
Status: DISCONTINUED | OUTPATIENT
Start: 2022-11-28 | End: 2022-12-06 | Stop reason: HOSPADM

## 2022-11-28 RX ORDER — PANTOPRAZOLE SODIUM 40 MG/1
40 TABLET, DELAYED RELEASE ORAL
Status: DISCONTINUED | OUTPATIENT
Start: 2022-11-29 | End: 2022-12-06 | Stop reason: HOSPADM

## 2022-11-28 RX ADMIN — QUETIAPINE 12.5 MG: 25 TABLET, FILM COATED ORAL at 20:38

## 2022-11-28 RX ADMIN — MIRTAZAPINE 7.5 MG: 7.5 TABLET ORAL at 20:38

## 2022-11-28 ASSESSMENT — ACTIVITIES OF DAILY LIVING (ADL)
ADLS_ACUITY_SCORE: 35

## 2022-11-28 ASSESSMENT — COLUMBIA-SUICIDE SEVERITY RATING SCALE - C-SSRS
5. HAVE YOU STARTED TO WORK OUT OR WORKED OUT THE DETAILS OF HOW TO KILL YOURSELF? DO YOU INTEND TO CARRY OUT THIS PLAN?: YES
1. IN THE PAST MONTH, HAVE YOU WISHED YOU WERE DEAD OR WISHED YOU COULD GO TO SLEEP AND NOT WAKE UP?: YES
1. HAVE YOU WISHED YOU WERE DEAD OR WISHED YOU COULD GO TO SLEEP AND NOT WAKE UP?: YES
4. HAVE YOU HAD THESE THOUGHTS AND HAD SOME INTENTION OF ACTING ON THEM?: YES
REASONS FOR IDEATION PAST MONTH: MOSTLY TO END OR STOP THE PAIN (YOU COULDN'T GO ON LIVING WITH THE PAIN OR HOW YOU WERE FEELING)
5. HAVE YOU STARTED TO WORK OUT OR WORKED OUT THE DETAILS OF HOW TO KILL YOURSELF? DO YOU INTEND TO CARRY OUT THIS PLAN?: YES
2. HAVE YOU ACTUALLY HAD ANY THOUGHTS OF KILLING YOURSELF?: YES
4. HAVE YOU HAD THESE THOUGHTS AND HAD SOME INTENTION OF ACTING ON THEM?: YES
2. HAVE YOU ACTUALLY HAD ANY THOUGHTS OF KILLING YOURSELF?: YES
ATTEMPT LIFETIME: NO
3. HAVE YOU BEEN THINKING ABOUT HOW YOU MIGHT KILL YOURSELF?: YES
TOTAL  NUMBER OF ABORTED OR SELF INTERRUPTED ATTEMPTS LIFETIME: NO
TOTAL  NUMBER OF INTERRUPTED ATTEMPTS LIFETIME: NO
6. HAVE YOU EVER DONE ANYTHING, STARTED TO DO ANYTHING, OR PREPARED TO DO ANYTHING TO END YOUR LIFE?: NO
REASONS FOR IDEATION LIFETIME: MOSTLY TO END OR STOP THE PAIN (YOU COULDN'T GO ON LIVING WITH THE PAIN OR HOW YOU WERE FEELING)
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
## 2022-11-28 NOTE — PLAN OF CARE
Elzbieta Ivan  November 28, 2022  Plan of Care Hand-off Note     Patient Care Path: Observation    Plan for Care:   Pt currently is having thoughts of suicide with plan and intent.  At times she is able to remind herself of the reasons she has for living, as well as the fact that she is here to start medication that will help her to feel better. However, due to her forgetfulness from alzheimer's, she has periods in which she forgets these things and becomes quite intent on her plan for suicide, making her unsafe to return home to independent living.    Recommend a period of observation on [facility] to start medication in the hopes that the SI will subside, or that she will be able to move into the memory care area of [facility] where she will have 24/7 care.     Critical Safety Issues: forgetful, thoughts of suicide    Overview:  This patient is a child/adolescent: No    This patient has additional special visitor precautions: No    Legal Status: Voluntary    Contacts:   Bacilio Ivan, [phone]: Contact .    Psychiatry Consult:  Psychiatry Consult not requested because pt has been seen by [facility] psychiatric provider    Updated RN and Attending Provider regarding plan of care.    Kathy Storey, LICSW

## 2022-11-28 NOTE — ED NOTES
I was asked to speak to this patients sister, Bacilio re: this patients living situation.  This patient lives at [facility] in the independent living. [facility] offers: SR living, AL living, Memory Care and Stay by the Day living options.  I spoke to Bacilio and she has returned the paperwork and made plans for this patient to move into Memory Care but they don't have a bed until mid December or earlier.  I brought up the Stay by the Day option and hiring private pay help to stay with this patient.  Bacilio didn't think this patient would let anyone into her apt. Bacilio will work with the [facility] DON to get this patient into the  living as soon as possible.   Bacilio is doing everything correctly and as quickly as she can for this patient.  The  at [facility] is a locked unit.  Bacilio will also take this patient to her PCP on Wednesday--this patient trusts her PCP as she has seen her for many years.   I did update the ER provider and DEC of the above.  I will be available if further needs arise.
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
## 2022-11-28 NOTE — ED NOTES
Bed: ED17  Expected date:   Expected time:   Means of arrival:   Comments:  [address] [age]F psych eval- alzheimers/dementia

## 2022-11-28 NOTE — ED PROVIDER NOTES
"  History     Chief Complaint:  Psychiatric Evaluation       HPI   Elzbieta Ivan is a 82 year old female with severe dementia, otherwise notable anxiety, GERD and Hashimoto's presenting from her independent living for reports of suicidal ideation.  She is present with her sister who reports has had pretty labile mood and recurrent statements of wanting to be dead and kill herself reportedly with a plan to walk out in the woods with her dog and go into a lake or pond stating \"It wouldn't be bloody\".  She denies any chest pain, chest pressure fevers chills trouble urinating or defecating.    ROS:  Review of Systems  Limited  to dementia    Allergies:  Cat Hair Extract  Scopolamine     Medications:    ALPRAZolam (XANAX PO)  aspirin-acetaminophen-caffeine (EXCEDRIN EXTRA STRENGTH) 250-250-65 MG per tablet  cholecalciferol (VITAMIN D3) 1000 units (25 mcg) capsule  DICLOFENAC PO  DULoxetine (CYMBALTA) 30 MG capsule  DULoxetine (CYMBALTA) 60 MG capsule  ipratropium (ATROVENT) 0.06 % nasal spray  Omeprazole (PRILOSEC PO)  polyethylene glycol 0.4%- propylene glycol 0.3% (SYSTANE ULTRA) 0.4-0.3 % SOLN ophthalmic solution  simvastatin (ZOCOR) 40 MG tablet        Past Medical History:    Past Medical History:   Diagnosis Date     Anxiety      Arrhythmia      Arthritis      Dysphagia      Fibromyalgia      Gastroesophageal reflux disease      Hashimoto's disease      Hyperlipidemia      Malignant neoplasm of cervix (H)      Migraine      Non-toxic nodular goiter      Osteoporosis      Polyp of vagina      Sleep apnea        Past Surgical History:    Past Surgical History:   Procedure Laterality Date     APPENDECTOMY        SECTION       EYE SURGERY      Bilat blephplsty     GYN SURGERY      hysterectomy with single oophrectomy     LAMINECTOMY LUMBAR ONE LEVEL Bilateral 2020    Procedure: LUMBAR 5 LAMINECTOMY BILATERALLY FOR EXCISION OF A LARGE SYNOVIAL CYST;  Surgeon: Antwan Burkett MD;  Location: [facility]" OR     ORTHOPEDIC SURGERY      knee arthroscopy     PUNCTURE ASPIRATION ABSCESS/HEMATOMA/CYST       REVERSE ARTHROPLASTY SHOULDER Left 2018    Procedure: REVERSE ARTHROPLASTY SHOULDER;  LEFT REVERSE TOTAL SHOULDER ARTHROPLASTY;  Surgeon: Con Tucker MD;  Location: [facility] OR     TONSILLECTOMY          Family History:    family history is not on file.    Social History:   reports that she has never smoked. She has never used smokeless tobacco. She reports that she does not drink alcohol and does not use drugs.  PCP: Brigid Santiago     Physical Exam     Patient Vitals for the past 24 hrs:   BP Temp Temp src Pulse Resp SpO2   22 1019 122/63 97.4  F (36.3  C) Temporal 68 18 97 %        Physical Exam  Constitutional: Alert, attentive, GCS 15   Eyes: EOM are normal, anicteric, conjugate gaze  CV: distal extremities warm, well perfused  Chest: Non-labored breathing on RA  GI:  non tender. No distension. No guarding or rebound.    Neurological: Alert, attentive, moving all extremities equally.   Skin: Skin is warm and dry.  Psych: Tearful, labile mood, passive suicidal ideation        Emergency Department Course   Emergency Department Course:  Reviewed:  I reviewed nursing notes, vitals and past medical history    Assessments:  1110 I obtained history and examined the patient as noted above.     Consults:   Care Coordinator  DEC      Disposition:  The patient was transferred to [facility].     Impression & Plan    Medical Decision Makin-year-old woman with severe dementia currently living in independent facility presenting for reports of depression and passive suicidal ideation.  She has a severe dementia at baseline, unable to tell me a story or even remember this this morning.  I do not perceive any clear plan or intent and she became tearful over an episode that happened when she was 31.  She has no somatic complaints, I do not suspect medical condition at this point, suspect this is progression of her  dementia.  Family is working on getting her into a higher level of care, as a courtesy I did have her seen by our DEC as well as her care coordinator, given their assessment we will plan for transfer to [facility] for further evaluation given primary concern of suicidal ideation with active plan.  Family is working on memory care bed, which might be available in several weeks. Medically cleared for transfer to [facility].     Diagnosis:    ICD-10-CM    1. Suicidal ideation  R45.851       2. Moderate Alzheimer's dementia with anxiety, unspecified timing of dementia onset (H)  G30.9     F02.B4            Con Mccain MD  [employer]  11:20 AM 11/28/22       Con Mccain MD  11/28/22 1527

## 2022-11-28 NOTE — ED NOTES
"82 year old female received from the ED due to suicidal ideation. Patient has a history of anxiety, depression, and dementia. Reports she is here because \"my internal management system is breaking down,\" Patient reports experiencing gradual losses and having trouble \"letting go.\" Patient claimed she sincerely considered killing herself some years back, but does not wish to do so today. She states she hopes to have her medications adjusted so \"I don't break my sister's heart by killing myself.\"   Nursing assessment complete including patient and medication profiles. Risk assessments completed addressing suicide,fall, nutrition and safety issues. Care plan initiated. Assessments reviewed with LMHP and physician. Video monitoring in progress, patient informed.  Admission information reviewed with patient. Pt given tour of the unit and instructions on using the facility. Questions regarding the unit addressed. Pt search completed and belongings inventoried.  "

## 2022-11-28 NOTE — ED TRIAGE NOTES
Resides at [facility] [address] [place] [address] at independent senior living. Hx of Dementia and Alzheimer, family with suicidal concern. Patient has been making suicidal comments lately. Has psy appointment on Wednesday.      Triage Assessment     Row Name 11/28/22 1019       Triage Assessment (Adult)    Airway WDL WDL       Respiratory WDL    Respiratory WDL WDL       Skin Circulation/Temperature WDL    Skin Circulation/Temperature WDL WDL       Cardiac WDL    Cardiac WDL WDL       Peripheral/Neurovascular WDL    Peripheral Neurovascular WDL WDL       Cognitive/Neuro/Behavioral WDL    Cognitive/Neuro/Behavioral WDL WDL              
Improved

## 2022-11-29 PROCEDURE — 99226 PR SUBSEQUENT OBSERVATION CARE,LEVEL III: CPT | Performed by: PSYCHIATRY & NEUROLOGY

## 2022-11-29 PROCEDURE — 250N000013 HC RX MED GY IP 250 OP 250 PS 637: Performed by: NURSE PRACTITIONER

## 2022-11-29 PROCEDURE — G0378 HOSPITAL OBSERVATION PER HR: HCPCS

## 2022-11-29 RX ADMIN — MIRTAZAPINE 7.5 MG: 7.5 TABLET ORAL at 20:20

## 2022-11-29 RX ADMIN — QUETIAPINE 12.5 MG: 25 TABLET, FILM COATED ORAL at 13:13

## 2022-11-29 RX ADMIN — QUETIAPINE 12.5 MG: 25 TABLET, FILM COATED ORAL at 18:35

## 2022-11-29 RX ADMIN — DULOXETINE 90 MG: 60 CAPSULE, DELAYED RELEASE ORAL at 08:41

## 2022-11-29 RX ADMIN — PANTOPRAZOLE SODIUM 40 MG: 40 TABLET, DELAYED RELEASE ORAL at 08:41

## 2022-11-29 RX ADMIN — SIMVASTATIN 40 MG: 40 TABLET, FILM COATED ORAL at 13:13

## 2022-11-29 RX ADMIN — QUETIAPINE 12.5 MG: 25 TABLET, FILM COATED ORAL at 22:19

## 2022-11-29 ASSESSMENT — COLUMBIA-SUICIDE SEVERITY RATING SCALE - C-SSRS
TOTAL  NUMBER OF INTERRUPTED ATTEMPTS SINCE LAST CONTACT: NO
5. HAVE YOU STARTED TO WORK OUT OR WORKED OUT THE DETAILS OF HOW TO KILL YOURSELF? DO YOU INTEND TO CARRY OUT THIS PLAN?: NO
SUICIDE, SINCE LAST CONTACT: NO
1. SINCE LAST CONTACT, HAVE YOU WISHED YOU WERE DEAD OR WISHED YOU COULD GO TO SLEEP AND NOT WAKE UP?: NO
6. HAVE YOU EVER DONE ANYTHING, STARTED TO DO ANYTHING, OR PREPARED TO DO ANYTHING TO END YOUR LIFE?: NO
TOTAL  NUMBER OF ABORTED OR SELF INTERRUPTED ATTEMPTS SINCE LAST CONTACT: NO
REASONS FOR IDEATION SINCE LAST CONTACT: MOSTLY TO END OR STOP THE PAIN (YOU COULDN'T GO ON LIVING WITH THE PAIN OR HOW YOU WERE FEELING)
ATTEMPT SINCE LAST CONTACT: NO
2. HAVE YOU ACTUALLY HAD ANY THOUGHTS OF KILLING YOURSELF?: YES

## 2022-11-29 ASSESSMENT — ACTIVITIES OF DAILY LIVING (ADL)
ADLS_ACUITY_SCORE: 35

## 2022-11-29 NOTE — ED NOTES
[facility] ED Note    Spoke briefly with pt's sister via phone to provide update.  She provided [facility] Director of nursing name and phone number: Diane Reggie [phone] as the person with whom she has been in contact to arrange for pt to move into their memory care unit.  Call placed and voicemail left with Diane.    Kathy Storey, [facility]

## 2022-11-29 NOTE — ED NOTES
Patient awake. Alert and oriented to person only. Pt was reoriented to her surroundings, situation, and time. She is accepting of information but notes that she feels very confused and frightened. Reassured that she is safe here and staff are here to help. Breakfast ordered for patient. Informed that she will meet with a psychiatric provider today. She is appreciative of information. Pt. Provided with TV remote and encouraged to rest this morning.
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
## 2022-11-29 NOTE — ED NOTES
Patient slept through the night without waking. Changing positions occasionally. Chest rise and fall noted.

## 2022-11-29 NOTE — ED NOTES
[facility] Crisis Reassessment      Elzbieta Ivan was reassessed after a period of observation at [facility] Pt was first seen on 11/28/22 by this writer; see the initial assessment note for details.      Patient Presentation    Initial ED presentation details: forgetful, confused at times, plan for suicide, redirectable.    Current patient presentation: Affect is a bit brighter, less talk of suicide, continued confusion and forgetfulness.    Changes observed since initial assessment: has settled in to the unit, appearing to feel much more comfortable and has needed far less attention from staff.  Started out requiring 1:1 staff, by afternoon was engaged in reading, writing or watching a movie on her own.    Sister spoke twice with pt and reported that she laughed at one point on the phone... the first time she has laughed in over a week.      Risk of Harm    [place] Suicide Severity Rating Scale Full Clinical Version:11/28/22  Suicidal Ideation  1. Wish to be Dead (Lifetime): Yes  1. Wish to be Dead (Past 1 Month): Yes  2. Non-Specific Active Suicidal Thoughts (Lifetime): Yes  2. Non-Specific Active Suicidal Thoughts (Past 1 Month): Yes  3. Active Suicidal Ideation with any Methods (Not Plan) Without Intent to Act (Lifetime): Yes  3. Active Suicidal Ideation with any Methods (Not Plan) Without Intent to Act (Past 1 Month): Yes  4. Active Suicidal Ideation with Some Intent to Act, Without Specific Plan (Lifetime): Yes  4. Active Suicidal Ideation with Some Intent to Act, Without Specific Plan (Past 1 Month): Yes  5. Active Suicidal Ideation with Specific Plan and Intent (Lifetime): Yes  5. Active Suicidal Ideation with Specific Plan and Intent (Past 1 Month): Yes  Intensity of Ideation  Most Severe Ideation Rating (Lifetime): 5  Most Severe Ideation Rating (Past 1 Month): 5  Frequency (Lifetime): Many times each day  Duration (Lifetime): Less than 1 hour/some of the time  Duration (Past 1 Month): Less than 1 hour/some of  the time  Controllability (Lifetime): Can control thoughts with a lot of difficulty  Controllability (Past 1 Month): Can control thoughts with a lot of difficulty  Deterrents (Lifetime): Deterrents probably stopped you  Deterrents (Past 1 Month): Deterrents probably stopped you  Reasons for Ideation (Lifetime): Mostly to end or stop the pain (You couldn't go on living with the pain or how you were feeling)  Reasons for Ideation (Past 1 Month): Mostly to end or stop the pain (You couldn't go on living with the pain or how you were feeling)  Suicidal Behavior  Actual Attempt (Lifetime): No  Has subject engaged in non-suicidal self-injurious behavior? (Lifetime): No  Interrupted Attempts (Lifetime): No  Aborted or Self-Interrupted Attempt (Lifetime): No  Preparatory Acts or Behavior (Lifetime): No  C-SSRS Risk (Lifetime/Recent)  Calculated C-SSRS Risk Score (Lifetime/Recent): High Risk    [place] Suicide Severity Rating Scale Since Last Contact: 11/29/22  Suicidal Ideation (Since Last Contact)  1. Wish to be Dead (Since Last Contact): No  2. Non-Specific Active Suicidal Thoughts (Since Last Contact): Yes  3. Active Suicidal Ideation with any Methods (Not Plan) Without Intent to Act (Since Last Contact): Yes  4. Active Suicidal Ideation with Some Intent to Act, Without Specific Plan (Since Last Contact): No  5. Active Suicidal Ideation with Specific Plan and Intent (Since Last Contact): No  Suicidal Behavior (Since Last Contact)  Actual Attempt (Since Last Contact): No  Has subject engaged in non-suicidal self-injurious behavior? (Since Last Contact): No  Interrupted Attempts (Since Last Contact): No  Aborted or Self-Interrupted Attempt (Since Last Contact): No  Preparatory Acts or Behavior (Since Last Contact): No  Suicide (Since Last Contact): No     C-SSRS Risk (Since Last Contact)  Calculated C-SSRS Risk Score (Since Last Contact): Moderate Risk    Validity of evaluation is impacted by presenting factors during  interview alzheimer's with significant forgetfullness.   Comments regarding subjective versus objective responses to [place] tool: n/a  Environmental or Psychosocial Events: worsening chronic illness  Chronic Risk Factors: chronic health problems   Warning Signs: talking or writing about death, dying, or suicide  Protective Factors: strong bond to family unit, community support, or employment, responsibilities and duties to others, including pets and children, lives in a responsibly safe and stable environment, good treatment engagement, sense of importance of health and wellness, able to access care without barriers, supportive ongoing medical and mental health care relationships, help seeking and sense of belonging  Interpretation of Risk Scoring, Risk Mitigation Interventions and Safety Plan:  Will remain at [facility] while starting new medication and awaiting arrangement of apartment in memory care unit      Does the patient have thoughts of harming others? No    Mental Status Exam   Affect: Appropriate   Appearance: Appropriate    Attention Span/Concentration: Attentive?  But forgetfull  Eye Contact: Engaged   Fund of Knowledge: Appropriate    Language /Speech Content: Fluent   Language /Speech Volume: Normal    Language /Speech Rate/Productions: Normal    Recent Memory: Variable   Remote Memory: Intact   Mood: Anxious    Orientation to Person: Yes    Orientation to Place: Yes with reminders  Orientation to Time of Day: No    Orientation to Date: No    Situation (Do they understand why they are here?): Yes  With reminders  Psychomotor Behavior: Normal    Thought Content: Clear   Thought Form: Intact and Other: forgetfull and confused at times       Additional Collateral Information   Sister has been updated and continues to work with [facility] on arrangements for pt to transfer to memory [facility]      Therapeutic Intervention  The following therapeutic methodologies were employed when working with the patient:  Establishing rapport, Active listening, Assess dimensions of crisis and Brief Supportive Therapy. Patient response to intervention: engaged.    Diagnosis:   296.32 (F33.1) Major Depressive Disorder, Recurrent Episode, Moderate _   Major neurocognitive disorder due to Alzheimer s disease, Probable, Without behavioral disturbance,[G30.9 +] - (F02.80), by history    Clinical Substantiation of Recommendations  Pt continues to have some thoughts of suicide with plan, but with less frequency and less intent. Recommend continued observation on [facility] with new medication regimen.  If she no longer reports any SI she could potentially return home with sister, or in sister's care, if sister is willing.  Best plan would be for discharge to memory care unit when available.     Plan:    Disposition  Recommended disposition: Medication Management and Other: memory care unit at [facility]      Reviewed case and recommendations with attending provider. Attending Name: Dr Burnett      Attending concurs with disposition: Yes      Patient concurs with disposition: Yes      Final disposition: Other: observation.         Assessment Details  Total duration spent on the patient case in minutes: 1.50 hrs     CPT code(s) utilized: 60050 - Psychotherapy (with patient) - 60 (53+*) min       Raleigh Han, [facility], [facility]  Callback: [phone]

## 2022-11-29 NOTE — ED NOTES
Patient has been sleeping comfortably in recliner. No signs of distress. Respirations even and unlabored.

## 2022-11-29 NOTE — ED NOTES
"Pt. Continues confused and easily frustrated by milieu as she does not understand reason for being here. Ongoing/almost constant support and reality orientation required. Poor appetite but will take fluids and eat cheerios. Has ALZ written on arm  in [place] (says this helps her remember she has alzheimers) Reports that symptoms of Alzheimers started in 2019 after she experienced an \"explosion\" where everything \"went white\" . Reports that she has random subsequent episodes like this but \"less intense than first one\". C/O of anxiety. Medicated with seroquel 12.5 after it was offered for anxiety.    "

## 2022-11-30 PROCEDURE — G0378 HOSPITAL OBSERVATION PER HR: HCPCS

## 2022-11-30 PROCEDURE — 250N000013 HC RX MED GY IP 250 OP 250 PS 637: Performed by: NURSE PRACTITIONER

## 2022-11-30 RX ADMIN — MIRTAZAPINE 7.5 MG: 7.5 TABLET ORAL at 21:36

## 2022-11-30 RX ADMIN — DULOXETINE 90 MG: 60 CAPSULE, DELAYED RELEASE ORAL at 07:52

## 2022-11-30 RX ADMIN — PANTOPRAZOLE SODIUM 40 MG: 40 TABLET, DELAYED RELEASE ORAL at 07:52

## 2022-11-30 RX ADMIN — SIMVASTATIN 40 MG: 40 TABLET, FILM COATED ORAL at 07:52

## 2022-11-30 ASSESSMENT — COLUMBIA-SUICIDE SEVERITY RATING SCALE - C-SSRS
2. HAVE YOU ACTUALLY HAD ANY THOUGHTS OF KILLING YOURSELF?: YES
1. SINCE LAST CONTACT, HAVE YOU WISHED YOU WERE DEAD OR WISHED YOU COULD GO TO SLEEP AND NOT WAKE UP?: NO
SUICIDE, SINCE LAST CONTACT: NO
6. HAVE YOU EVER DONE ANYTHING, STARTED TO DO ANYTHING, OR PREPARED TO DO ANYTHING TO END YOUR LIFE?: NO
REASONS FOR IDEATION SINCE LAST CONTACT: MOSTLY TO END OR STOP THE PAIN (YOU COULDN'T GO ON LIVING WITH THE PAIN OR HOW YOU WERE FEELING)
TOTAL  NUMBER OF ABORTED OR SELF INTERRUPTED ATTEMPTS SINCE LAST CONTACT: NO
5. HAVE YOU STARTED TO WORK OUT OR WORKED OUT THE DETAILS OF HOW TO KILL YOURSELF? DO YOU INTEND TO CARRY OUT THIS PLAN?: NO
TOTAL  NUMBER OF INTERRUPTED ATTEMPTS SINCE LAST CONTACT: NO
ATTEMPT SINCE LAST CONTACT: NO

## 2022-11-30 ASSESSMENT — ACTIVITIES OF DAILY LIVING (ADL)
ADLS_ACUITY_SCORE: 61
ADLS_ACUITY_SCORE: 61
ORAL_HYGIENE: WITH ASSISTANCE
ADLS_ACUITY_SCORE: 35
DRESS: WITH SUPERVISION
ADLS_ACUITY_SCORE: 61
ADLS_ACUITY_SCORE: 35
ADLS_ACUITY_SCORE: 61
HYGIENE/GROOMING: WITH ASSISTANCE
ADLS_ACUITY_SCORE: 35
ADLS_ACUITY_SCORE: 61
ADLS_ACUITY_SCORE: 61
ADLS_ACUITY_SCORE: 35
ADLS_ACUITY_SCORE: 61
ADLS_ACUITY_SCORE: 61

## 2022-11-30 NOTE — ED NOTES
Pt. continues confused requiring almost constant reality orientation and review of events that lead to her being on [facility]. Has difficulty retaining any information. [facility] unit. Locked herself in the bathroom twice. Periods of frustration and anger especially with sister who she perceives as being controlling of her life. Ate well for dinner. Assisted to complete HS cares. Worked on puzzle with writer - unable to put pieces together-just moved pieces around. Unable to achieve sleep so medicated with additional dose of seroquel at 2230.

## 2022-11-30 NOTE — ED NOTES
[facility] Crisis Reassessment      Elzbieta Ivan was reassessed after a period of observation at [facility]. Pt was first seen on 11/28/22 by this writer; see the initial assessment note for details.    Patient Presentation    Initial ED presentation details: confused, forgetful, pleasant, with periods of feeling angry and scared. Easily redirected, tearful when reporting thoughts of suicide with plan and intent.    Current patient presentation: Confused with periods of clarity.  Less talk of suicide, affect appears brighter, though continued mood lability with periods of anger and fear.  Continues to be easily redirected and/or distracted.      Changes observed since initial assessment: Has become more comfortable as she has become more familiar with the [facility] unit.  Has taken medication as ordered.  Appears to do better having others around her at all times and the ability to interact with them when she wishes.       Risk of Harm    [place] Suicide Severity Rating Scale Full Clinical Version:11/28/22  Suicidal Ideation  1. Wish to be Dead (Lifetime): Yes  1. Wish to be Dead (Past 1 Month): Yes  2. Non-Specific Active Suicidal Thoughts (Lifetime): Yes  2. Non-Specific Active Suicidal Thoughts (Past 1 Month): Yes  3. Active Suicidal Ideation with any Methods (Not Plan) Without Intent to Act (Lifetime): Yes  3. Active Suicidal Ideation with any Methods (Not Plan) Without Intent to Act (Past 1 Month): Yes  4. Active Suicidal Ideation with Some Intent to Act, Without Specific Plan (Lifetime): Yes  4. Active Suicidal Ideation with Some Intent to Act, Without Specific Plan (Past 1 Month): Yes  5. Active Suicidal Ideation with Specific Plan and Intent (Lifetime): Yes  5. Active Suicidal Ideation with Specific Plan and Intent (Past 1 Month): Yes  Intensity of Ideation  Most Severe Ideation Rating (Lifetime): 5  Most Severe Ideation Rating (Past 1 Month): 5  Frequency (Lifetime): Many times each day  Duration (Lifetime): Less  than 1 hour/some of the time  Duration (Past 1 Month): Less than 1 hour/some of the time  Controllability (Lifetime): Can control thoughts with a lot of difficulty  Controllability (Past 1 Month): Can control thoughts with a lot of difficulty  Deterrents (Lifetime): Deterrents probably stopped you  Deterrents (Past 1 Month): Deterrents probably stopped you  Reasons for Ideation (Lifetime): Mostly to end or stop the pain (You couldn't go on living with the pain or how you were feeling)  Reasons for Ideation (Past 1 Month): Mostly to end or stop the pain (You couldn't go on living with the pain or how you were feeling)  Suicidal Behavior  Actual Attempt (Lifetime): No  Has subject engaged in non-suicidal self-injurious behavior? (Lifetime): No  Interrupted Attempts (Lifetime): No  Aborted or Self-Interrupted Attempt (Lifetime): No  Preparatory Acts or Behavior (Lifetime): No  C-SSRS Risk (Lifetime/Recent)  Calculated C-SSRS Risk Score (Lifetime/Recent): High Risk    [place] Suicide Severity Rating Scale Since Last Contact: 11/30/22    Suicidal Ideation (Since Last Contact)  1. Wish to be Dead (Since Last Contact): No  2. Non-Specific Active Suicidal Thoughts (Since Last Contact): Yes  3. Active Suicidal Ideation with any Methods (Not Plan) Without Intent to Act (Since Last Contact): Yes  4. Active Suicidal Ideation with Some Intent to Act, Without Specific Plan (Since Last Contact): No  5. Active Suicidal Ideation with Specific Plan and Intent (Since Last Contact): No  Suicidal Behavior (Since Last Contact)  Actual Attempt (Since Last Contact): No  Has subject engaged in non-suicidal self-injurious behavior? (Since Last Contact): No  Interrupted Attempts (Since Last Contact): No  Aborted or Self-Interrupted Attempt (Since Last Contact): No  Preparatory Acts or Behavior (Since Last Contact): No  Suicide (Since Last Contact): No     C-SSRS Risk (Since Last Contact)  Calculated C-SSRS Risk Score (Since Last Contact):  Moderate Risk    Validity of evaluation is not impacted by presenting factors during interview pt with worsening symptoms of alzheimer's.   Comments regarding subjective versus objective responses to [place] tool: n/a  Environmental or Psychosocial Events: helplessness/hopelessness, impulsivity/recklessness and worsening chronic illness  Chronic Risk Factors: other: forgetfulness due to alzheimer's   Warning Signs: talking or writing about death, dying, or suicide, hopelessness and no reason for living, no sense of purpose in life  Protective Factors: strong bond to family unit, community support, or employment, responsibilities and duties to others, including pets and children, lives in a responsibly safe and stable environment, good treatment engagement, sense of importance of health and wellness, able to access care without barriers, supportive ongoing medical and mental health care relationships, help seeking and sense of belonging  Interpretation of Risk Scoring, Risk Mitigation Interventions and Safety Plan:  When no longer reporting intent for suicide, will discharge to memory care unit or with 24/7care provided at her current apartment in independent living if memory care not available.    Does the patient have thoughts of harming others? No    Mental Status Exam   Affect: Appropriate   Appearance: Appropriate    Attention Span/Concentration: Attentive?    Eye Contact: Engaged   Fund of Knowledge: Appropriate    Language /Speech Content: Fluent   Language /Speech Volume: Normal    Language /Speech Rate/Productions: Normal    Recent Memory: Poor   Remote Memory: Intact   Mood: Anxious, Normal and Sad    Orientation to Person: Yes    Orientation to Place: Answer: Will state that she doesn't know where she is and accepts information about this.   Orientation to Time of Day: Yes, based upon the light outside, understands that it's either day or night.  Orientation to Date: No    Situation (Do they understand  why they are here?): Answer: Asks questions about this and eventually expressess understanding when reminded.    Psychomotor Behavior: Normal    Thought Content: Clear   Thought Form: Other: forgetfull and easily confused       Additional Collateral Information   0930: Another call was placed to the director of nursing,  Diane Paredes @ [phone] and message left regarding need for pt to go to their memory care unit ASAP.  Return call received from Diane, indicating that the soonest they can have the unit ready for pt in memory care, is Monday, 12/5/22.    Spoke with pt's sister and [facility] about option of pt returning to her apartment in independent living with 24hr care.  So far no options have been found for this to be set up in the next couple of days and for such a short amount of time.    Therapeutic Intervention  The following therapeutic methodologies were employed when working with the patient: Establishing rapport, Active listening, Assess dimensions of crisis, Apply solution-focused therapy to address current crisis and Brief Supportive Therapy. Patient response to intervention: engaged, though needing constant reminders of what we're talking about.    Diagnosis:   296.32 (F33.1) Major Depressive Disorder, Recurrent Episode, Moderate _   Major neurocognitive disorder due to Alzheimer s disease, Probable, Without behavioral disturbance,[G30.9 +] - (F02.80), by history    Clinical Substantiation of Recommendations  Pt continues to be unable to safely care for herself and can not return to her previous independent living.  Her affect has brightened and her talk of suicide with plan and intent has lessened.  Recommend continued observation on [facility] until safe disposition is available.     Plan:    Disposition  Recommended disposition: Medication Management and Other: memory care unit      Reviewed case and recommendations with attending provider. Attending Name: Dr Burnett      Attending conccarlo  with disposition: Yes      Patient concurs with disposition: Yes      Final disposition: Other: observation.         Assessment Details  Total duration spent on the patient case in minutes: 1.0 hrs     CPT code(s) utilized: 39191 - Psychotherapy (with patient) - 30 (16-37*) min       Raleigh Han, [facility], [facility]  Callback: [phone]

## 2022-11-30 NOTE — PLAN OF CARE
"[id number]: AOx1-2, disoriented to situation, time & place - needs frequent redirection. Calm throughout shift. Asked many questions about going home and not understanding why she was here. Writer explained multiple times and pt seemed satisfied with answer. Patient spent shift doing a puzzle with RN. In the AM, patient was able to successfully complete the puzzle, around noon patient became more confused and did not understand how to do a puzzle. Spent afternoon in recliner w/ magazines & book. Patient has the letters ALZ written on her L wrist \"so that she remembers that she has alzheimer's and so that other people know too.\" Compliant with meds, no PRNs needed.   "

## 2022-12-01 PROCEDURE — G0008 ADMIN INFLUENZA VIRUS VAC: HCPCS | Performed by: PSYCHIATRY & NEUROLOGY

## 2022-12-01 PROCEDURE — 90662 IIV NO PRSV INCREASED AG IM: CPT | Performed by: PSYCHIATRY & NEUROLOGY

## 2022-12-01 PROCEDURE — 250N000013 HC RX MED GY IP 250 OP 250 PS 637: Performed by: NURSE PRACTITIONER

## 2022-12-01 PROCEDURE — G0378 HOSPITAL OBSERVATION PER HR: HCPCS

## 2022-12-01 PROCEDURE — 250N000011 HC RX IP 250 OP 636: Performed by: PSYCHIATRY & NEUROLOGY

## 2022-12-01 RX ADMIN — SIMVASTATIN 40 MG: 40 TABLET, FILM COATED ORAL at 07:59

## 2022-12-01 RX ADMIN — PANTOPRAZOLE SODIUM 40 MG: 40 TABLET, DELAYED RELEASE ORAL at 07:59

## 2022-12-01 RX ADMIN — QUETIAPINE 12.5 MG: 25 TABLET, FILM COATED ORAL at 19:04

## 2022-12-01 RX ADMIN — INFLUENZA A VIRUS A/VICTORIA/2570/2019 IVR-215 (H1N1) ANTIGEN (FORMALDEHYDE INACTIVATED), INFLUENZA A VIRUS A/DARWIN/9/2021 SAN-010 (H3N2) ANTIGEN (FORMALDEHYDE INACTIVATED), INFLUENZA B VIRUS B/PHUKET/3073/2013 ANTIGEN (FORMALDEHYDE INACTIVATED), AND INFLUENZA B VIRUS B/MICHIGAN/01/2021 ANTIGEN (FORMALDEHYDE INACTIVATED) 0.7 ML: 60; 60; 60; 60 INJECTION, SUSPENSION INTRAMUSCULAR at 10:11

## 2022-12-01 RX ADMIN — MIRTAZAPINE 7.5 MG: 7.5 TABLET ORAL at 21:13

## 2022-12-01 RX ADMIN — DULOXETINE 90 MG: 60 CAPSULE, DELAYED RELEASE ORAL at 07:59

## 2022-12-01 ASSESSMENT — ACTIVITIES OF DAILY LIVING (ADL)
ADLS_ACUITY_SCORE: 61

## 2022-12-01 ASSESSMENT — COLUMBIA-SUICIDE SEVERITY RATING SCALE - C-SSRS
2. HAVE YOU ACTUALLY HAD ANY THOUGHTS OF KILLING YOURSELF?: NO
1. SINCE LAST CONTACT, HAVE YOU WISHED YOU WERE DEAD OR WISHED YOU COULD GO TO SLEEP AND NOT WAKE UP?: NO
5. HAVE YOU STARTED TO WORK OUT OR WORKED OUT THE DETAILS OF HOW TO KILL YOURSELF? DO YOU INTEND TO CARRY OUT THIS PLAN?: NO

## 2022-12-01 NOTE — ED NOTES
[facility] Crisis Reassessment      Elzbieta Ivan was reassessed for the following reasons: Observation Status. Pt was first seen on 11/28/22 by Kathy [facility]y [facility]; see the initial assessment note for details.      Patient Presentation    Initial ED presentation details: forgetful, confused at times, plan for suicide, redirectable.    Current patient presentation: patient remains calm and cooperative, although continues to be confused and forgetful at times.  Patient denies SI to writer today, no concerns regarding HI or SIB.      Changes observed since initial assessment: denies SI today, continues to be calm and cooperative, redirectable when needed.      Risk of Harm    [place] Suicide Severity Rating Scale Full Clinical Version: 11/28/22  Suicidal Ideation  1. Wish to be Dead (Lifetime): Yes  1. Wish to be Dead (Past 1 Month): Yes  2. Non-Specific Active Suicidal Thoughts (Lifetime): Yes  2. Non-Specific Active Suicidal Thoughts (Past 1 Month): Yes  3. Active Suicidal Ideation with any Methods (Not Plan) Without Intent to Act (Lifetime): Yes  3. Active Suicidal Ideation with any Methods (Not Plan) Without Intent to Act (Past 1 Month): Yes  4. Active Suicidal Ideation with Some Intent to Act, Without Specific Plan (Lifetime): Yes  4. Active Suicidal Ideation with Some Intent to Act, Without Specific Plan (Past 1 Month): Yes  5. Active Suicidal Ideation with Specific Plan and Intent (Lifetime): Yes  5. Active Suicidal Ideation with Specific Plan and Intent (Past 1 Month): Yes  Intensity of Ideation  Most Severe Ideation Rating (Lifetime): 5  Most Severe Ideation Rating (Past 1 Month): 5  Frequency (Lifetime): Many times each day  Duration (Lifetime): Less than 1 hour/some of the time  Duration (Past 1 Month): Less than 1 hour/some of the time  Controllability (Lifetime): Can control thoughts with a lot of difficulty  Controllability (Past 1 Month): Can control thoughts with a lot of difficulty  Deterrents  (Lifetime): Deterrents probably stopped you  Deterrents (Past 1 Month): Deterrents probably stopped you  Reasons for Ideation (Lifetime): Mostly to end or stop the pain (You couldn't go on living with the pain or how you were feeling)  Reasons for Ideation (Past 1 Month): Mostly to end or stop the pain (You couldn't go on living with the pain or how you were feeling)  Suicidal Behavior  Actual Attempt (Lifetime): No  Has subject engaged in non-suicidal self-injurious behavior? (Lifetime): No  Interrupted Attempts (Lifetime): No  Aborted or Self-Interrupted Attempt (Lifetime): No  Preparatory Acts or Behavior (Lifetime): No  C-SSRS Risk (Lifetime/Recent)  Calculated C-SSRS Risk Score (Lifetime/Recent): High Risk    [place] Suicide Severity Rating Scale Since Last Contact: 12/1/22  Suicidal Ideation (Since Last Contact)  1. Wish to be Dead (Since Last Contact): No  2. Non-Specific Active Suicidal Thoughts (Since Last Contact): No  3. Active Suicidal Ideation with any Methods (Not Plan) Without Intent to Act (Since Last Contact): No  4. Active Suicidal Ideation with Some Intent to Act, Without Specific Plan (Since Last Contact): No  5. Active Suicidal Ideation with Specific Plan and Intent (Since Last Contact): No  Suicidal Behavior (Since Last Contact)  Actual Attempt (Since Last Contact): No  Has subject engaged in non-suicidal self-injurious behavior? (Since Last Contact): No  Interrupted Attempts (Since Last Contact): No  Aborted or Self-Interrupted Attempt (Since Last Contact): No  Preparatory Acts or Behavior (Since Last Contact): No  Suicide (Since Last Contact): No     C-SSRS Risk (Since Last Contact)  Calculated C-SSRS Risk Score (Since Last Contact): No Risk Indicated    Validity of evaluation is impacted by presenting factors during interview: neurocognitive symptoms are primary.   Comments regarding subjective versus objective responses to [place] tool: NA  Environmental or Psychosocial Events: worsening  chronic illness  Chronic Risk Factors: chronic health problems   Warning Signs: talking or writing about death, dying, or suicide  Protective Factors: strong bond to family unit, community support, or employment, responsibilities and duties to others, including pets and children, lives in a responsibly safe and stable environment, good treatment engagement, sense of importance of health and wellness, supportive ongoing medical and mental health care relationships, help seeking and sense of belonging  Interpretation of Risk Scoring, Risk Mitigation Interventions and Safety Plan:  Risk is determine to be low, patient denies SI/HI/NSSIB today        Does the patient have thoughts of harming others? No    Mental Status Exam   Affect: Appropriate   Appearance: Appropriate    Attention Span/Concentration: Attentive and Other: but forgetful ?    Eye Contact: Engaged   Fund of Knowledge: Appropriate    Language /Speech Content: Fluent   Language /Speech Volume: Normal    Language /Speech Rate/Productions: Normal    Recent Memory: Variable   Remote Memory: Intact   Mood: Anxious    Orientation to Person: Yes    Orientation to Place: No   Orientation to Time of Day: No    Orientation to Date: No    Situation (Do they understand why they are here?): No    Psychomotor Behavior: Normal    Thought Content: Clear   Thought Form: Other: forgetful/confused        Additional Collateral Information   Writer consulted with Angelic ([phone]) [facility] ED Care Coordinator, who reported she has spoken to the patient's sister a few times, she has passed along various resources to assist with discharge, including Stay by Day options at the [facility] and private pay in-home care.  Angelic said between these two resources the patient should be able to discharge with interim care until she is moved into memory care- which will take place on Tuesday 12/6/22.      Spoke to patient's sister Bacilio Ivan at [phone] (two times).   Bacilio reports the patient has nowhere to go, she states [facility] is unable to provide Stay by Day care, she confirms memory care will begin on 12/6/22, no options for anything sooner.  Bacilio also reports she attempted to arrange private pay in-home care, however she was informed they do not provide short term care, they have to do an intake and the process is not suited for a 3-4 day need.  Bacilio states she prefers the patient admit to a medical unit, and then discharge directly to memory care on Tuesday (12/6/22).      Diane from [facility] called ([phone]), she is requesting a copy of consults and ED notes to provide to [facility] to assist with admission, patient signed YORDAN, information faxed to [facility] at [phone].  Diane also faxed [facility] a form to be completed by attending provider, form completed by Dr. Burnett and faxed back as requested (copy placed in patient's chart).  Diane confirmed patient is able to move into memory care on Tuesday 12/6/22.        Therapeutic Intervention  The following therapeutic methodologies were employed when working with the patient: Establishing rapport, Active listening, Assess dimensions of crisis, Establish a discharge plan, Motivational Interviewing, Brief Supportive Therapy and Safety planning. Patient response to intervention: appropriate in context of neurocognitive disorder.    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  Patient has been cleared for discharge.  Identified options for discharge:    -  Stay with her sister Bacilio (Bacilio declines at this time due to patient needing 24 hour supervision)  -  Establish in-home care in the interim until she  is able to move into memory care (Bacilio states this is not feasible as  in-home care does not provide short-term care)  -  Admit to medical unit while awaiting admission to memory care (request has been elevated to management, awaiting  Decision)   * [facility].       After her time at [facility], the patient's circumstances and mental state were safe for outpatient management. Patient engaged in therapeutic treatment/intervention and aftercare planning by [facility] care team, including consultation with attending provider. Follow-up recommendations are for ongoing memory care, psychiatry, and/or therapy.  Patient is to return to the ED if any urgent or potentially life-threatening concerns arise.           Plan:    Disposition  Recommended disposition: Individual Therapy, Medication Management and Other: Memory Care       Reviewed case and recommendations with attending provider. Attending Name: Dr. Burnett       Attending concurs with disposition: Yes      Patient concurs with disposition: Yes      Final disposition: Individual therapy , Medication management and Other: Memory Care .         Assessment Details  Total duration spent on the patient case in minutes: .50 hrs     CPT code(s) utilized: 18836 - Psychotherapy (with patient) - 45 (38-52*) min       CHRISTIANE Powers, [facility]  Callback: [phone]      Aftercare Plan     If I am feeling unsafe or I am in a crisis, I will:   Contact my established care providers   Call the National Suicide Prevention Lifeline: 988  Go to the nearest emergency room   Call 872              Warning signs that I or other people might notice when a crisis is developing for me: I am having increasing suicidal thoughts, these thoughts turn to plans with intent or means; I am having thoughts and urges to self-harm; my emotions are of hopelessness; feeling like there's no way out; increased rage or anger; engaging in risky  activities without thinking; withdrawing from family/friends; dramatic mood swings; drastic personality changes; use of alcohol or drugs; neglect of personal hygiene or cares        Things I am able to do on my own or with others to cope or help me feel better: Spending quality time with loved ones, Staying hydrated, Eating balanced meals, Going for a walk every day, Take care of daily responsibilities/needs, Focus on positive self-talk vs negative self-talk, listen to music, practice deep breathing, meditations, write in a journal, self-regulate, self check-in, ask for help when needed        Things I can use or do for distraction: Reach out to/spend time with family and friends, take a warm shower or bath, Exercise, chores or do a project, listen to music, watch movie/TV, journaling, reading a book, meditating, call a friend     Changes I can make to support my mental health and wellness:   -I will abstain from all mood altering chemicals not currently prescribed to me        -I will attend scheduled mental health therapy and psychiatric appointments and follow all recommendations       -I will commit to 30 minutes of self care daily - this can be as simple as taking a shower, going for a walk, cooking a meal, read, writing, etc       -I will practice square breathing when I begin to feel anxious - in breath through the nose for the count of 4 and the first line on the square. Out breath through the mouth for the count of 4 for the second line of the square. Repeat to complete the square. Repeat the square as many times as needed.       - I will use distraction skills of: going for walks, watching TV, spending time outside, calling a friend or family member, spending time with my pets        -Use community resources, including hotline numbers, [facility] crisis and support meetings       -Maintain a daily schedule/routine       -Practice deep breathing skills       -Download a meditation keyla and spend 15-20 minutes  "per day mediating/relaxing. Some apps to download include: Calm, Headspace and Insight Timer. All 3 of these apps have free version       People in my life that I can ask for help: my sister Bacilio, Assisted Living Care staff     Your [facility] has a mental health crisis team you can call 24/7: [facility] Crisis Line Number: [phone]       Other things that are important when I'm in crisis: Remember help is available, follow up with established providers, attend all appointments, take medications as prescribed??      Additional resources and information:         Ways to [place]:      Take prescribed medications as scheduled     Keep follow-up appointments     Talk to others about your concerns     Get regular exercise     Eat a healthy diet     Use community resources      Crisis Lines  Crisis Text Line  Text 076983  You will be connected with a trained live crisis counselor to provide support.     Por osito, osiriso  DENISE a 012423 o texto a 442-AYUDAME en WhatsApp     The Sudhakar Project (LGBTQ Youth Crisis Line)  5.487.524.8398  text START to 601-537        "[employer]  Fast Tracker  Linking people to mental health and substance use disorder resources  Task Messenger.TalkLife      [place] Mental Health Warm Line  Peer to peer support  Monday thru Saturday, 12 pm to 10 pm  657.830.0893 or 0.008.875.6872  Text \"Support\" to 91145     National [place] on Mental Illness (KAELA)  588.656.8175 or 1.888.KAELA.HELPS        Mental Health Apps  My3  https://myWorld Vital Recordspp.org/     VirtualHopeBox  https://Sympoz [employer]dba Craftsy).org/apps/virtual-hope-box/        Additional Information  Today you were seen by a licensed mental health professional through Triage and Transition services, [facility] ([facility])  for a crisis assessment in the Emergency Department at [facility].  It is recommended that you follow up with your established providers (psychiatrist, mental health therapist, and/or primary care " doctor - as relevant) as soon as possible. Coordinators from [facility] will be calling you in the next 24-48 hours to ensure that you have the resources you need.  You can also contact [facility] coordinators directly at [phone]. You may have been scheduled for or offered an appointment with a mental health provider. [facility] maintains an extensive network of licensed behavioral health providers to connect patients with the services they need.  We do not charge providers a fee to participate in our referral network.  We match patients with providers based on a patient's specific needs, insurance coverage, and location.  Our first effort will be to refer you to a provider within your care system, and will utilize providers outside your care system as needed.

## 2022-12-01 NOTE — ED NOTES
Pt has darío present on the unit. Pt was a bit anxious this morning and distorted to time and place. Pt does get confused and is forgetful. Pt talked with staff at length about the plan for her treatment. Pt did appear anxious when she was told that she would be staying here until they found a memory care placement. Pt was able to talk with Bacilio her sister about this and appeared to calm her down and helped reoriented her to what is happening. Pt continue to be very confused an forgetful and does need to be reoriented form time to time. Pt is eating and drinking well. Pt denies any SI. Pt was given flu vaccine this morning. Nursing to continue to monitor.

## 2022-12-01 NOTE — ED NOTES
Patient visible all shift. She is eating, drinking and taking medications as prescribed. Patient is receptive to redirection. Patient is mostly oriented to self but does talk about her sister delivering clothing here this evening which did happen. Patient also talks about her childhood and her adult life. Patient said she has two x-husbands who both had chemical dependency issues. Patient also said her father was a functional alcoholic with a respectable income as a  and her mother maintained a job at a pharmacy. Patient said she and her only sibling both elected to not have any children. At times patient needs to be reminded she is in a hospital setting as she will ask staff how she can be useful as an employee.

## 2022-12-01 NOTE — DISCHARGE INSTRUCTIONS
Aftercare Plan     If I am feeling unsafe or I am in a crisis, I will:   Contact my established care providers   Call the National Suicide Prevention Lifeline: 988  Go to the nearest emergency room   Call 911              Warning signs that I or other people might notice when a crisis is developing for me: I am having increasing suicidal thoughts, these thoughts turn to plans with intent or means; I am having thoughts and urges to self-harm; my emotions are of hopelessness; feeling like there's no way out; increased rage or anger; engaging in risky activities without thinking; withdrawing from family/friends; dramatic mood swings; drastic personality changes; use of alcohol or drugs; neglect of personal hygiene or cares        Things I am able to do on my own or with others to cope or help me feel better: Spending quality time with loved ones, Staying hydrated, Eating balanced meals, Going for a walk every day, Take care of daily responsibilities/needs, Focus on positive self-talk vs negative self-talk, listen to music, practice deep breathing, meditations, write in a journal, self-regulate, self check-in, ask for help when needed        Things I can use or do for distraction: Reach out to/spend time with family and friends, take a warm shower or bath, Exercise, chores or do a project, listen to music, watch movie/TV, journaling, reading a book, meditating, call a friend     Changes I can make to support my mental health and wellness:   -I will abstain from all mood altering chemicals not currently prescribed to me        -I will attend scheduled mental health therapy and psychiatric appointments and follow all recommendations       -I will commit to 30 minutes of self care daily - this can be as simple as taking a shower, going for a walk, cooking a meal, read, writing, etc       -I will practice square breathing when I begin to feel anxious - in breath through the nose for the count of 4 and the first line on the  "square. Out breath through the mouth for the count of 4 for the second line of the square. Repeat to complete the square. Repeat the square as many times as needed.       - I will use distraction skills of: going for walks, watching TV, spending time outside, calling a friend or family member, spending time with my pets        -Use community resources, including hotline numbers, [facility] crisis and support meetings       -Maintain a daily schedule/routine       -Practice deep breathing skills       -Download a meditation keyla and spend 15-20 minutes per day mediating/relaxing. Some apps to download include: Calm, Headspace and Insight Timer. All 3 of these apps have free version       People in my life that I can ask for help: my sister Bacilio, Assisted Living Care staff     Your [facility] has a mental health crisis team you can call 24/7: [facility] Crisis Line Number: [phone]       Other things that are important when I'm in crisis: Remember help is available, follow up with established providers, attend all appointments, take medications as prescribed??      Additional resources and information:         Ways to [place]:      Take prescribed medications as scheduled     Keep follow-up appointments     Talk to others about your concerns     Get regular exercise     Eat a healthy diet     Use community resources      Crisis Lines  Crisis Text Line  Text 537275  You will be connected with a trained live crisis counselor to provide support.     Por espanol, texto  DENISE a 851426 o texto a 442-AYUDAME en WhatsApp     The Sudhakar Project (LGBTQ Youth Crisis Line)  5.691.155.2402  text START to 863-815        Community Resources  Fast Tracker  Linking people to mental health and substance use disorder resources  fasttrackm2p-labsn.org      [place] Mental Health Warm Line  Peer to peer support  Monday thru Saturday, 12 pm to 10 pm  495.392.2597 or 8.033.926.5099  Text \"Support\" to 03378     National [place] on " Mental Illness (KAELA)  920.852.2075 or 1.888.KAELA.HELPS        Mental Health Apps  My3  https://SEDLinepp.org/     VirtualHopeBox  https://Efficient Frontier/apps/virtual-hope-box/        Additional Information  Today you were seen by a licensed mental health professional through Triage and Transition services, [facility] (P)  for a crisis assessment in the Emergency Department at [facility].  It is recommended that you follow up with your established providers (psychiatrist, mental health therapist, and/or primary care doctor - as relevant) as soon as possible. Coordinators from [facility] will be calling you in the next 24-48 hours to ensure that you have the resources you need.  You can also contact [facility] coordinators directly at [phone]. You may have been scheduled for or offered an appointment with a mental health provider. [facility] maintains an extensive network of licensed behavioral health providers to connect patients with the services they need.  We do not charge providers a fee to participate in our referral network.  We match patients with providers based on a patient's specific needs, insurance coverage, and location.  Our first effort will be to refer you to a provider within your care system, and will utilize providers outside your care system as needed.

## 2022-12-01 NOTE — ED NOTES
[facility] Progress Note    Client Name: Elzbieta Ivan  Date: November 30, 2022  Service Type:  Group Therapy  Session Start Time:  7:30pm  Session End Time: 7:50pm  Session Length: 30 mins  Attendees: Patient and other group members  Facilitator: Veronika Denise     Topic:   Guided Imagery    Intervention:    Group process: support, challenge, affirm, psycho-education.     Response:  Patient did participate in group. Behavior in group was appropriate & engaged. Patient participated in a guided imagery exercise & identified benefits of doing so.       Veronika Denise

## 2022-12-01 NOTE — ED NOTES
I gave [facility] staff private pay resources for her sister, Bacilio to set up once this patient is discharged.  I did speak with Bacilio about this on Tuesday and at that time she didn't want the resources until she spoke with the DON at [facility].  I also noted on the resources that [facility] can provide private pay in home services and had availability on Tuesday.  I left Bryan lynn VM that this patients sister Bacilio might be calling for assistance.  I have completed this consult.

## 2022-12-02 PROCEDURE — G0378 HOSPITAL OBSERVATION PER HR: HCPCS

## 2022-12-02 PROCEDURE — 250N000013 HC RX MED GY IP 250 OP 250 PS 637: Performed by: NURSE PRACTITIONER

## 2022-12-02 RX ORDER — DULOXETIN HYDROCHLORIDE 30 MG/1
30 CAPSULE, DELAYED RELEASE ORAL 2 TIMES DAILY
COMMUNITY
End: 2022-12-06

## 2022-12-02 RX ADMIN — DULOXETINE 90 MG: 60 CAPSULE, DELAYED RELEASE ORAL at 08:27

## 2022-12-02 RX ADMIN — PANTOPRAZOLE SODIUM 40 MG: 40 TABLET, DELAYED RELEASE ORAL at 08:27

## 2022-12-02 RX ADMIN — QUETIAPINE 12.5 MG: 25 TABLET, FILM COATED ORAL at 01:29

## 2022-12-02 RX ADMIN — MIRTAZAPINE 7.5 MG: 7.5 TABLET ORAL at 22:16

## 2022-12-02 RX ADMIN — SIMVASTATIN 40 MG: 40 TABLET, FILM COATED ORAL at 08:27

## 2022-12-02 ASSESSMENT — ACTIVITIES OF DAILY LIVING (ADL)
ADLS_ACUITY_SCORE: 61

## 2022-12-02 NOTE — ED NOTES
[facility] Group Progress Note    Client Name: Elzbieta Ivan  Date: December 1, 2022  Service Type:  Group Therapy  Session Start Time:  8:30pm  Session End Time: 8:48pm  Session Length: 18 minuets      Attendees: Patient      Facilitator: JOHANNA Gallagher LGSW     Topic:   Distress Tolerance/ Coping Skills    Intervention:    Group process: Discussed with patient DBT skills of distress tolerance and coping skills. We talked about breathing, meditation, and grounding self. Walked patient through box breathing.       Response:  Patient did participate in group. Behavior in group was engaged and attentive. 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 She found box breathing to be helpful and appreciated that this is a technique that can be readily accessed regardless of location. Patient acknowledged that this tool could be beneficial while on the unit and took handout.       CHRISTIANE Duckworth

## 2022-12-02 NOTE — CONSULTS
[facility] Crisis Reassessment      Elzbieta Ivan was reassessed for the following reasons: Observation Status. Pt was first seen on 11/28/22 by Kathy Storey [facility]; see the initial assessment note for details.      Patient Presentation    Initial ED presentation details: forgetful, confused at times, plan for suicide, redirectable.    Current patient presentation: patient remains calm and cooperative, although continues to be confused and forgetful at times.  No current SI endorsed, no concerns regarding HI or SIB. She reports some resentment towards her sister for having to stay on unit so long.      Changes observed since initial assessment: denies SI today, continues to be calm and cooperative, redirectable when needed.      Risk of Harm    No significant changes since last [place] assessment.       Does the patient have thoughts of harming others? No    Mental Status Exam   Affect: Appropriate   Appearance: Appropriate    Attention Span/Concentration: Attentive?    Eye Contact: Engaged   Fund of Knowledge: Appropriate    Language /Speech Content: Fluent   Language /Speech Volume: Normal    Language /Speech Rate/Productions: Normal    Recent Memory: Variable and Other: can recall many things but this is impacted by her dx of Alzheimers   Remote Memory: Variable   Mood: Anxious and Depressed    Orientation to Person: Yes    Orientation to Place: No   Orientation to Time of Day: No    Orientation to Date: No    Situation (Do they understand why they are here?): No    Psychomotor Behavior: Normal    Thought Content: Clear and Other: confused, forgetful due to alzheimers dx   Thought Form: Other: forgetful; confused       Additional Collateral Information   NA      Therapeutic Intervention  The following therapeutic methodologies were employed when working with the patient: Establishing rapport, Active listening, Assess dimensions of crisis and Brief Supportive Therapy. Patient response to intervention:  engaged.    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  Patient has been cleared for discharge.  Identified options for discharge:     -  Stay with her sister Bacilio (Bacilio declines at this time due to patient needing 24 hour supervision)  -  Establish in-home care in the interim until she is able to move into memory care (Bacilio states this is not feasible as   in-home care does not provide short-term care)  -  Admit to medical unit while awaiting admission to memory care (request has been elevated to management, awaiting    Decision)   * Patient will board on Em[facility] until appropriate placement is identified.       Plan:    Disposition  Recommended disposition: Individual Therapy, Medication Management and Other: Memory Care      Reviewed case and recommendations with attending provider. Attending Name: Dr. Burnett      Attending concurs with disposition: Yes      Patient concurs with disposition: Yes      Final disposition: Other: observation on [facility] while awaiting medical admit or memory care.         Assessment Details  Total duration spent on the patient case in minutes: 1.0 hrs     CPT code(s) utilized: 08780 - Psychotherapy (with patient) - 60 (53+*) min       RADHA Monsivais  Callback: [phone]

## 2022-12-02 NOTE — ED NOTES
Pt remains confused this morning and a bit agitated. Pt was asking about when she can leave and who can get her out of here. Pt was reoriented and explained that she is wanting to go to a memory care facility and per plan this would take place on Tuesday. Pt was struggling to recall the events but was able to talk with staff and later spoke with Bacilio who helped reiterate the plan. Pt was accepting of this as of now and understand that she will need to wait till Tuesday. Pt is eating and drinking well. Pt denies any current issues at the moment. Pt denies SI. Pt remains alert to person only. Pt is anxious at times but redirectable. Pt is pleasant dn cooperative. Nursing to continue to monitor.

## 2022-12-02 NOTE — ED NOTES
Patient visible all shift. Pleasant and cooperative with staff and peers. Patient is receptive to redirection. Patient ate about fifty percent of her dinner after selecting her dinner options with staff assistance. Patient able to win at tic-tac-toe a few times with staff. Patient also able to do crossword puzzles with staff. However, some redirection is required while completing the crossword puzzle and the tic-tac-toe game. This evening patient changed into a fresh pair of scrubs and mesh underwear. Patient also took out her upper dentures and brushed the dentures with toothpaste along with the rest of her teeth. Patient prefers to wear her dentures at night while in a public setting. A denture cup was provided in case she changes her mind.

## 2022-12-03 PROCEDURE — G0378 HOSPITAL OBSERVATION PER HR: HCPCS

## 2022-12-03 PROCEDURE — 250N000013 HC RX MED GY IP 250 OP 250 PS 637: Performed by: NURSE PRACTITIONER

## 2022-12-03 RX ORDER — ACETAMINOPHEN 325 MG/10.15ML
650 LIQUID ORAL EVERY 4 HOURS PRN
Status: DISCONTINUED | OUTPATIENT
Start: 2022-12-03 | End: 2022-12-06 | Stop reason: HOSPADM

## 2022-12-03 RX ADMIN — DULOXETINE 90 MG: 60 CAPSULE, DELAYED RELEASE ORAL at 08:51

## 2022-12-03 RX ADMIN — PANTOPRAZOLE SODIUM 40 MG: 40 TABLET, DELAYED RELEASE ORAL at 08:51

## 2022-12-03 RX ADMIN — QUETIAPINE 12.5 MG: 25 TABLET, FILM COATED ORAL at 08:51

## 2022-12-03 RX ADMIN — QUETIAPINE 12.5 MG: 25 TABLET, FILM COATED ORAL at 01:18

## 2022-12-03 RX ADMIN — SIMVASTATIN 40 MG: 40 TABLET, FILM COATED ORAL at 08:51

## 2022-12-03 RX ADMIN — QUETIAPINE 12.5 MG: 25 TABLET, FILM COATED ORAL at 22:36

## 2022-12-03 RX ADMIN — MIRTAZAPINE 7.5 MG: 7.5 TABLET ORAL at 22:36

## 2022-12-03 ASSESSMENT — ACTIVITIES OF DAILY LIVING (ADL)
ADLS_ACUITY_SCORE: 61

## 2022-12-03 NOTE — PROGRESS NOTES
Pt has been awake for about an hour, she is extremely confused and needs constant direction. She does get irritable due to her confusion and knowing where she is at.

## 2022-12-03 NOTE — PROGRESS NOTES
"Pt was awake early this AM, pacing unit. Writer assisted her with AM cares, and getting breakfast. She ate well, she is steady and ambulates well, and is cooperative but very confused and needs constant direction of what to do and how to perform certain tasks. She has been very tearful and \"afraid\". She believes that she is in longterm and is sad and frustrated \"becouse I don't know what I did wrong, I don't have a ticket or anything  I never did anything wrong.\" She has required constant one to one attention and reassurance. Her confusion is causing her to be anxious and  distressed. She has had several outbursts of crying out of \"fear of not knowing what to do, where I am, why I am here.\" She denies SI/HI, pain, or hallucinations. She talks about her Sister and worries about her Dog. Listening, comfort, redirection, and reassurance provided, Given PRN dose of Seroquel 12.5 mg given, she is now resting with her feet up and TV on. Will monitor.   "

## 2022-12-03 NOTE — ED NOTES
Patient visible this shift. Pleasant and cooperative with staff and peers. Patient eating, drinking and taking medications as prescribed.Patient demonstrated an improved ability to occupy herself this shift especially with reading. Patient also attended a guided-meditation group. No suicidal ideation endorsed by patient this shift.

## 2022-12-03 NOTE — ED NOTES
Em[facility] Group Progress Note    Client Name: Alicia Date: December 2, 2022  Service Type:  Group Therapy  Session Start Time:  645pm  Session End Time: 715pm  Session Length: 30min  Attendees: Patient and other group members  Facilitator: Ambar Zuniga [facility]     Topic:   Guided meditation     Intervention:    Group process: support, challenge, affirm, psycho-education.     Response:  Patient did participate in group. Behavior in group was appropriate and participartory. Patient shared thoughts and feelings around medication and usefulness of skill.       Ambar Zuniga [facility]

## 2022-12-04 PROCEDURE — 250N000013 HC RX MED GY IP 250 OP 250 PS 637: Performed by: NURSE PRACTITIONER

## 2022-12-04 PROCEDURE — G0378 HOSPITAL OBSERVATION PER HR: HCPCS

## 2022-12-04 RX ADMIN — PANTOPRAZOLE SODIUM 40 MG: 40 TABLET, DELAYED RELEASE ORAL at 08:57

## 2022-12-04 RX ADMIN — MIRTAZAPINE 7.5 MG: 7.5 TABLET ORAL at 21:36

## 2022-12-04 RX ADMIN — SIMVASTATIN 40 MG: 40 TABLET, FILM COATED ORAL at 09:04

## 2022-12-04 RX ADMIN — DULOXETINE 90 MG: 60 CAPSULE, DELAYED RELEASE ORAL at 08:57

## 2022-12-04 RX ADMIN — QUETIAPINE 12.5 MG: 25 TABLET, FILM COATED ORAL at 14:57

## 2022-12-04 ASSESSMENT — ACTIVITIES OF DAILY LIVING (ADL)
ADLS_ACUITY_SCORE: 61

## 2022-12-04 NOTE — ED NOTES
Pt is present on the unit. Pt remains confused to date time and place. Pt was scot to be reoriented this morning and explained that the plan is for transition to a memory care facility at the earliest of Tuesday. Pt reoriented to date and time. Pt has been watching TV socializing with peers. Pt was offered multiple food choices this morning but refused them as they  were not appetizing for her. Pt continues to have moments of forgetfulness. Pt is also [place] which also makes some communication difficult for her. Nursing to continue to monitor.

## 2022-12-04 NOTE — ED NOTES
"Patient visible all shift. Eating, drinking and taking medications. Patient at times enjoys giving advice to staff on how the unit could run more efficiently. This shift patient declined a shower stating, \"I would prefer to shower closer to my discharge so I am fresh.\" Patient said maybe she would shower tomorrow since she may discharge on Tuesday. With staff encouragement staff brushed her upper dentures and let them soak for a few minutes. Patient said she prefers to wear dentures in a public setting. Patient needs to be reminded it is ok to sleep in her scrubs as she feels her scrubs are daytime attire.  "

## 2022-12-05 PROCEDURE — G0378 HOSPITAL OBSERVATION PER HR: HCPCS

## 2022-12-05 PROCEDURE — 250N000013 HC RX MED GY IP 250 OP 250 PS 637: Performed by: NURSE PRACTITIONER

## 2022-12-05 RX ADMIN — QUETIAPINE 12.5 MG: 25 TABLET, FILM COATED ORAL at 13:14

## 2022-12-05 RX ADMIN — MIRTAZAPINE 7.5 MG: 7.5 TABLET ORAL at 20:11

## 2022-12-05 RX ADMIN — DULOXETINE 90 MG: 60 CAPSULE, DELAYED RELEASE ORAL at 08:07

## 2022-12-05 RX ADMIN — PANTOPRAZOLE SODIUM 40 MG: 40 TABLET, DELAYED RELEASE ORAL at 08:07

## 2022-12-05 RX ADMIN — ACETAMINOPHEN 500 MG: 500 TABLET ORAL at 20:11

## 2022-12-05 RX ADMIN — SIMVASTATIN 40 MG: 40 TABLET, FILM COATED ORAL at 08:07

## 2022-12-05 RX ADMIN — QUETIAPINE 12.5 MG: 25 TABLET, FILM COATED ORAL at 19:16

## 2022-12-05 ASSESSMENT — ACTIVITIES OF DAILY LIVING (ADL)
ADLS_ACUITY_SCORE: 61

## 2022-12-05 NOTE — ED NOTES
"Patient able to eat 25% of her dinner with staff at table with her. She ate 25% of her rice and chicken, a few scoops of apple sauce and consumed a small amount of apple juice and water. Patient claimed she was \"full\" after eating this small amount of food. However, patient did also complain of a bolus of food \"sticking on the way down\" causing some discomfort while trying to eat. Patient will eat more readily in the company of others while having conversation. Patient is able to converse relatively well about her past work experience and family of origin. Patient appears to not be able to track as clearly when discussing current activities going on around her. Patient declined a shower stating, \"I prefer to be in my own shower when I get to my new living situation on Tuesday.\" Patient appeared confused when presented with using warm towlettes to clean herself.   "

## 2022-12-05 NOTE — ED NOTES
[facility] ED Note    1:29PM: Message left for pt's sister Bacilio regarding plan for pt to transfer to her new apartment at [facility] tomorrow.  Bacilio spoke with one of the RNs earlier today, asking if she could pick pt up tomorrow afternoon rather than in the morning as the movers will be there between 9 and noon tomorrow.  In the message, I let Bacilio know that afternoon was fine, though before change of shift at 3pm would be ideal if possible.    Will check in with Bacilio again tomorrow, but at this time we will plan on Bacilio picking pt up around 2pm tomorrow.     Kathy Storey, [id number]

## 2022-12-05 NOTE — ED NOTES
Pt. Calmed late afternoon after struggling with high level of anxiety most of the afternoon. Currently meeting with nurse from the facility she will transition to tomorrow.

## 2022-12-05 NOTE — ED NOTES
"Pt. Started to have a panic attack around 1300 today. Tearful, fearful and confused. Ruminating about her sister working hard to make arrangements for her-fears she will get tired of her and abandon her. Reports if she doesn't have her sister she will be \"alone and lost in this world\". She is also very frustrated with sister making decisions about her life. (So is very conflicted.) Starts having memories of difficult situations in past-ruminates and cries. Does not want to be left alone. Follows staff around the unit for support. Requiring a lot of 1:1 attention. Given dose of seroquel 12.5 mg at 1315  "

## 2022-12-06 ENCOUNTER — MEDICAL CORRESPONDENCE (OUTPATIENT)
Dept: HEALTH INFORMATION MANAGEMENT | Facility: CLINIC | Age: 82
End: 2022-12-06

## 2022-12-06 VITALS
WEIGHT: 118.4 LBS | TEMPERATURE: 98.8 F | HEART RATE: 75 BPM | OXYGEN SATURATION: 96 % | SYSTOLIC BLOOD PRESSURE: 97 MMHG | DIASTOLIC BLOOD PRESSURE: 62 MMHG | BODY MASS INDEX: 23.25 KG/M2 | HEIGHT: 60 IN | RESPIRATION RATE: 16 BRPM

## 2022-12-06 PROCEDURE — 250N000013 HC RX MED GY IP 250 OP 250 PS 637: Performed by: NURSE PRACTITIONER

## 2022-12-06 PROCEDURE — G0378 HOSPITAL OBSERVATION PER HR: HCPCS

## 2022-12-06 PROCEDURE — 99217 PR OBSERVATION CARE DISCHARGE: CPT | Performed by: PSYCHIATRY & NEUROLOGY

## 2022-12-06 RX ORDER — MIRTAZAPINE 15 MG/1
7.5 TABLET, FILM COATED ORAL AT BEDTIME
Qty: 15 TABLET | Refills: 0 | Status: SHIPPED | OUTPATIENT
Start: 2022-12-06 | End: 2023-07-19

## 2022-12-06 RX ORDER — DULOXETIN HYDROCHLORIDE 30 MG/1
90 CAPSULE, DELAYED RELEASE ORAL DAILY
Qty: 90 CAPSULE | Refills: 0 | Status: SHIPPED | OUTPATIENT
Start: 2022-12-06 | End: 2023-08-09

## 2022-12-06 RX ORDER — QUETIAPINE FUMARATE 25 MG/1
12.5 TABLET, FILM COATED ORAL 2 TIMES DAILY PRN
Qty: 30 TABLET | Refills: 0 | Status: SHIPPED | OUTPATIENT
Start: 2022-12-06 | End: 2023-05-25

## 2022-12-06 RX ADMIN — SIMVASTATIN 40 MG: 40 TABLET, FILM COATED ORAL at 07:49

## 2022-12-06 RX ADMIN — PANTOPRAZOLE SODIUM 40 MG: 40 TABLET, DELAYED RELEASE ORAL at 07:50

## 2022-12-06 RX ADMIN — DULOXETINE 90 MG: 60 CAPSULE, DELAYED RELEASE ORAL at 07:49

## 2022-12-06 ASSESSMENT — ACTIVITIES OF DAILY LIVING (ADL)
ADLS_ACUITY_SCORE: 61

## 2022-12-06 ASSESSMENT — COLUMBIA-SUICIDE SEVERITY RATING SCALE - C-SSRS
ATTEMPT SINCE LAST CONTACT: NO
6. HAVE YOU EVER DONE ANYTHING, STARTED TO DO ANYTHING, OR PREPARED TO DO ANYTHING TO END YOUR LIFE?: NO
2. HAVE YOU ACTUALLY HAD ANY THOUGHTS OF KILLING YOURSELF?: NO
TOTAL  NUMBER OF INTERRUPTED ATTEMPTS SINCE LAST CONTACT: NO
TOTAL  NUMBER OF ABORTED OR SELF INTERRUPTED ATTEMPTS SINCE LAST CONTACT: NO
SUICIDE, SINCE LAST CONTACT: NO
1. SINCE LAST CONTACT, HAVE YOU WISHED YOU WERE DEAD OR WISHED YOU COULD GO TO SLEEP AND NOT WAKE UP?: NO
5. HAVE YOU STARTED TO WORK OUT OR WORKED OUT THE DETAILS OF HOW TO KILL YOURSELF? DO YOU INTEND TO CARRY OUT THIS PLAN?: NO

## 2022-12-06 NOTE — ED NOTES
Pt. very confused and  paranoid this AM.  Requires almost constant reassurance of safety and reality orientation. VSS. Smiles when conversation involves sister (kairna) and dog ( gertrude).

## 2022-12-06 NOTE — ED NOTES
[facility] Note      Patient was invited to group programming but declined to attend.       SUE Eric on 12/5/2022 at 9:07 PM

## 2022-12-06 NOTE — ED PROVIDER NOTES
EmPATH Unit - Psychiatric Observation Discharge Summary  [facility] Emergency Department  Discharge Date: 12/6/2022    Elzbieta Ivan MRN: [id number]   Age: 82 year old YOB: 1940     Brief HPI & Initial ED Course     Chief Complaint   Patient presents with     Psychiatric Evaluation     HPI  Elzbieta Ivan is a 82 year old female with history notable for major neurocognitive disorder further complicated by depressive symptoms and recent suicidal ideation.  She has been in the emergency department, currently on the EmPATH unit, for over 190 hours.  Her stay has been prolonged due to barriers related to disposition.  During her stay on the unit, she was monitored for suicidal ideation however maintained symptom stability and did not report reoccurrence of suicidal thoughts.  She did not engage in any self-injurious or hostile behaviors.  Cognitive impairments remain prominent and consistent.  She was awaiting bed availability on the memory care unit which prolonged her stay on the EmPATH unit.  Once a bed was available for her, she was transferred directly there.      During her stay on our unit, Cymbalta was continued however the dose was lowered from 120 mg daily to 90 mg daily to minimize potential side effects which could have been contributing to mood instability and/or irritability at the higher dose while noting no meaningful benefit at the higher dose.  Remeron was added at a dose of 7.5 mg nightly to maintain mood stability while promoting sleep.  Xanax was discontinued to minimize medications that can negatively contribute to cognitive impairment.  It was replaced with Seroquel 12.5 mg twice a day as needed for management of anxiety and/or agitation.        Physical Examination   BP: 97/62  Pulse: 75  Temp: 98.8  F (37.1  C)  Resp: 16  Height: 152.4 cm (5')  Weight: 53.7 kg (118 lb 6.4 oz)  SpO2: 96 %    Physical Exam  General: Appears stated age.   Neuro: Alert and fully oriented.  Extremities appear to demonstrate normal strength on visual inspection.   Integumentary/Skin: no rash visualized, normal color    Psychiatric Examination   Appearance: awake, alert  Attitude:  cooperative  Eye Contact:  fair  Mood:  good  Affect:  appropriate and in normal range  Speech:  clear, coherent  Psychomotor Behavior:  no evidence of tardive dyskinesia, dystonia, or tics  Thought Process:  linear  Associations:  no loose associations  Thought Content:  no evidence of suicidal ideation or homicidal ideation and no evidence of psychotic thought  Insight:  limited  Judgement:  limited  Oriented to:  person only  Attention Span and Concentration:  limited  Recent and Remote Memory:  limited  Language: able to name/identify objects without impairment  Fund of Knowledge: intact with awareness of current and past events    Results        Labs Ordered and Resulted from Time of ED Arrival to Time of ED Departure   COVID-19 VIRUS (CORONAVIRUS) BY PCR - Normal       Result Value    SARS CoV2 PCR Negative         Observation Course   The patient was found to have a psychiatric condition that would benefit from an observation stay in the emergency department for further psychiatric stabilization and/or coordination of a safe disposition. The plan upon observation admission included serial assessments of psychiatric condition, potential administration of medications if indicated, further disposition pending the patient's psychiatric course during the monitoring period.     Serial assessments of the patient's psychiatric condition were performed. Nursing notes were reviewed. During the observation period, the patient did not require medications for agitation, and did not require restraints/seclusion for patient and/or provider safety.     After a period of working with the treatment team on the EmPATH unit, the patient's mental state improved to allow a safe transition to outpatient care. After counseling on the  diagnosis, work-up, and treatment plan, the patient was discharged. Close follow-up with a psychiatrist and/or therapist was recommended and community psychiatric resources were provided. Patient is to return to the ED if any urgent or potentially life-threatening concerns.     Discharge Diagnoses:   Final diagnoses:   Suicidal ideation   Depression, unspecified depression type   Major neurocognitive disorder (H) - r/o Alzheimer's dementia   Anxiety       Treatment Plan:  -Continue Cymbalta for mood and anxiety management; noting that the dose has been reduced by 30 mg to a total of 90 mg once a day.  -Continue mirtazapine 7.5 mg for insomnia; noting this to be the newly added medication  -Continue Seroquel 12.5 mg twice a day as needed for management of anxiety or agitation; noting this to be newly added medication which has replaced Xanax  -Resume outpatient medication management appointments  -Transfer to a memory care facility today.      At the time of discharge, the patient's acute suicide risk was determined to be low due to the following factors: Reduction in the intensity of mood/anxiety symptoms that preceded the admission, denial of suicidal thoughts, denies feeling helpless or helpless, not currently under the influence of alcohol or illicit substances, denies experiencing command hallucinations, no immediate access to firearms. The patient's acute risk could be higher if noncompliant with their treatment plan, medications, follow-up appointments or using illicit substances or alcohol. Protective factors include: social supports, stable supportive housing    --  Ulisses Burnett MD  [facility] EMERGENCY DEPT  EmPATH Unit  12/6/2022      Ulisses Burnett MD  12/06/22 0905

## 2022-12-06 NOTE — ED NOTES
"Pt. had a difficult evening tonight after interview with RN from  [facility]. Very upset and paranoid that her sister is trying to steal her money. Suspicious of other patients \"motives\". Following writer around the unit requesting ongoing support, reassurance and reality orientation.  Difficult to distract with activities-reports \" I have too much on my mind\"  Medicated for 2nd time today with dose of Seroquel 12.5    "

## 2022-12-06 NOTE — ED NOTES
Pt. calmer this afternoon after having a dose of seroquel. Continues confused but less paranoid. Preparing for discharge around 1400.

## 2022-12-06 NOTE — ED NOTES
Pt. daughter arrived for discharge at 1345. Discharge instructions and medication regime reviewed with sister-copies given for care center. Pt. appeared very happy to see her sister. Eagerly gathered belongings and left with sister.    Discharge instructions reviewed with patient including follow-up care plan. Educated on medication regime and advised not to stop prescribed medication without consulting their physician. Reviewed safety plan. Denies  SI. All belongings which where brought into the hospital have been returned to patient. Escorted off the unit at 1400  accompanied writer. Discharged back to living situation with increased supervision and support in place.

## 2022-12-06 NOTE — ED NOTES
[facility] Crisis Reassessment      Elzbieta Ivan was reassessed after a period of observation at [facility] and in readiness for planned discharge today. Pt was first seen on 11/28/22 by this writer; see the initial assessment note for details.      Patient Presentation    Initial ED presentation details: quite forgetful and confused, with plan and intent for suicide.    Current patient presentation: some paranoia, considerable confusion and forgetfulness, no longer reporting thoughts, intent or plan for suicide.     Changes observed since initial assessment: after a period of adjustment, pt's affect and mood appeared to improve.  She was willing and able to engage with other patients and the treatment team, take medications as prescribed and sleep well through the nights. She experienced some lability in mood, with periods of anxiety likely related to her worsening forgetfulness and confusion due to alzheimer's. .       Risk of Harm    [place] Suicide Severity Rating Scale Full Clinical Version:11/28/22  Suicidal Ideation  1. Wish to be Dead (Lifetime): Yes  1. Wish to be Dead (Past 1 Month): Yes  2. Non-Specific Active Suicidal Thoughts (Lifetime): Yes  2. Non-Specific Active Suicidal Thoughts (Past 1 Month): Yes  3. Active Suicidal Ideation with any Methods (Not Plan) Without Intent to Act (Lifetime): Yes  3. Active Suicidal Ideation with any Methods (Not Plan) Without Intent to Act (Past 1 Month): Yes  4. Active Suicidal Ideation with Some Intent to Act, Without Specific Plan (Lifetime): Yes  4. Active Suicidal Ideation with Some Intent to Act, Without Specific Plan (Past 1 Month): Yes  5. Active Suicidal Ideation with Specific Plan and Intent (Lifetime): Yes  5. Active Suicidal Ideation with Specific Plan and Intent (Past 1 Month): Yes  Intensity of Ideation  Most Severe Ideation Rating (Lifetime): 5  Most Severe Ideation Rating (Past 1 Month): 5  Frequency (Lifetime): Many times each day  Duration (Lifetime): Less  than 1 hour/some of the time  Duration (Past 1 Month): Less than 1 hour/some of the time  Controllability (Lifetime): Can control thoughts with a lot of difficulty  Controllability (Past 1 Month): Can control thoughts with a lot of difficulty  Deterrents (Lifetime): Deterrents probably stopped you  Deterrents (Past 1 Month): Deterrents probably stopped you  Reasons for Ideation (Lifetime): Mostly to end or stop the pain (You couldn't go on living with the pain or how you were feeling)  Reasons for Ideation (Past 1 Month): Mostly to end or stop the pain (You couldn't go on living with the pain or how you were feeling)  Suicidal Behavior  Actual Attempt (Lifetime): No  Has subject engaged in non-suicidal self-injurious behavior? (Lifetime): No  Interrupted Attempts (Lifetime): No  Aborted or Self-Interrupted Attempt (Lifetime): No  Preparatory Acts or Behavior (Lifetime): No  C-SSRS Risk (Lifetime/Recent)  Calculated C-SSRS Risk Score (Lifetime/Recent): High Risk    [place] Suicide Severity Rating Scale Since Last Contact: 12/06/22    Suicidal Ideation (Since Last Contact)  1. Wish to be Dead (Since Last Contact): No  2. Non-Specific Active Suicidal Thoughts (Since Last Contact): No  3. Active Suicidal Ideation with any Methods (Not Plan) Without Intent to Act (Since Last Contact): No  4. Active Suicidal Ideation with Some Intent to Act, Without Specific Plan (Since Last Contact): No  5. Active Suicidal Ideation with Specific Plan and Intent (Since Last Contact): No  Suicidal Behavior (Since Last Contact)  Actual Attempt (Since Last Contact): No  Has subject engaged in non-suicidal self-injurious behavior? (Since Last Contact): No  Interrupted Attempts (Since Last Contact): No  Aborted or Self-Interrupted Attempt (Since Last Contact): No  Preparatory Acts or Behavior (Since Last Contact): No  Suicide (Since Last Contact): No     C-SSRS Risk (Since Last Contact)  Calculated C-SSRS Risk Score (Since Last Contact): No  Risk Indicated    Validity of evaluation is not impacted by presenting factors during interview - pt continues to experience confusion and forgetfulness due to alzheimer's.   Comments regarding subjective versus objective responses to [place] tool: n/a  Environmental or Psychosocial Events: impulsivity/recklessness, neither working nor attending school and recent life events: having to move from independent living to memory care apartment  Chronic Risk Factors: chronic health problems   Warning Signs: seeking access to means to hurt or kill self, talking or writing about death, dying, or suicide, anxiety, agitation, unable to sleep, sleeping all the time, no reason for living, no sense of purpose in life and recent losses (physical, financial, personal)  Protective Factors: strong bond to family unit, community support, or employment, responsibilities and duties to others, including pets and children, lives in a responsibly safe and stable environment, good treatment engagement, sense of importance of health and wellness, able to access care without barriers, supportive ongoing medical and mental health care relationships, help seeking and sense of belonging  Interpretation of Risk Scoring, Risk Mitigation Interventions and Safety Plan: Pt will be d/c'd today to her new apartment in memory care at [facility]. Their director of nursing, Diane Paredes, met with pt here at [facility] last night for the initial intake.    Does the patient have thoughts of harming others? No    Mental Status Exam   Affect: Constricted   Appearance: Appropriate    Attention Span/Concentration: Attentive?    Eye Contact: Engaged   Fund of Knowledge: Appropriate    Language /Speech Content: Fluent   Language /Speech Volume: Normal    Language /Speech Rate/Productions: Normal    Recent Memory: Variable   Remote Memory: Intact   Mood: Anxious and Irritable    Orientation to Person: Yes    Orientation to Place: No   Orientation to Time of  Day: Yes , with prompting.  Orientation to Date: No    Situation (Do they understand why they are here?): No    Psychomotor Behavior: Normal    Thought Content: Paranoia   Thought Form: Loose Associations and Tangential       Additional Collateral Information    is anticipating pt's arrival to her new apartment this afternoon. Pt's sister, Baciloi will be picking her up around 2pm to bring her there.       Therapeutic Intervention  The following therapeutic methodologies were employed when working with the patient: Establishing rapport, Active listening, Assess dimensions of crisis, Apply solution-focused therapy to address current crisis, Identify additional supports and alternative coping skills, Establish a discharge plan, Brief Supportive Therapy and Safety planning. Patient response to intervention: engaged though forgetfull.    Diagnosis:   296.32 (F33.1) Major Depressive Disorder, Recurrent Episode, Moderate _   Major neurocognitive disorder due to Alzheimer s disease, Probable, Without behavioral disturbance,[G30.9 +] - (F02.80), by history      Clinical Substantiation of Recommendations  Pt's [facility] and [facility] pt taking medications as prescribed and able to sleep through the night most nights. She continues to struggle with significant confusion and forgetfulness, but no longer reports any thoughts, intent or plan for suicide. [facility] is recommended at this time.    Plan:    Disposition  Recommended disposition: Other: [facility]       Reviewed case and recommendations with attending provider. Attending Name: Dr Burnett      Attending concurs with disposition: Yes      Patient concurs with disposition: Yes      Final disposition: Other: [facility] apartment at [facility].         Assessment Details  Total duration spent on the patient case in minutes: 1.50 hrs     CPT code(s) utilized: 09547  - Psychotherapy (with patient) - 45 (99-52*) min       Raleigh Han, [facility], [facility]  Callback: [phone]

## 2022-12-06 NOTE — PROGRESS NOTES
"Patient very confused and fearful upon waking. She was oriented x 0 and this writer reoriented patient to person, place, time, and situation. She requires 1:1 and sits with this writer at computer holding hand. She was provided with warm blanket, orange juice, and hand warmer. Patient repeast the same questions every 1-2 minutes \"What is this place?\" \"Do I belong here?\" \"What happened?\" \"Am I a nurse?\". Patient was reassured that she is safe here. Does not respond well to distraction at this time.   "

## 2023-05-24 ENCOUNTER — ASSISTED LIVING VISIT (OUTPATIENT)
Dept: GERIATRICS | Facility: CLINIC | Age: 83
End: 2023-05-24
Payer: COMMERCIAL

## 2023-05-24 VITALS
SYSTOLIC BLOOD PRESSURE: 123 MMHG | TEMPERATURE: 97.2 F | DIASTOLIC BLOOD PRESSURE: 77 MMHG | BODY MASS INDEX: 25.02 KG/M2 | WEIGHT: 128.1 LBS | RESPIRATION RATE: 18 BRPM | HEART RATE: 97 BPM

## 2023-05-24 DIAGNOSIS — I51.7 LVH (LEFT VENTRICULAR HYPERTROPHY): ICD-10-CM

## 2023-05-24 DIAGNOSIS — F33.41 RECURRENT MAJOR DEPRESSIVE DISORDER, IN PARTIAL REMISSION (H): ICD-10-CM

## 2023-05-24 DIAGNOSIS — K21.9 GASTROESOPHAGEAL REFLUX DISEASE, UNSPECIFIED WHETHER ESOPHAGITIS PRESENT: ICD-10-CM

## 2023-05-24 DIAGNOSIS — E78.5 HYPERLIPIDEMIA, UNSPECIFIED HYPERLIPIDEMIA TYPE: ICD-10-CM

## 2023-05-24 DIAGNOSIS — G30.1 MODERATE LATE ONSET ALZHEIMER'S DEMENTIA WITH PSYCHOTIC DISTURBANCE (H): Primary | ICD-10-CM

## 2023-05-24 DIAGNOSIS — M81.0 AGE-RELATED OSTEOPOROSIS WITHOUT CURRENT PATHOLOGICAL FRACTURE: ICD-10-CM

## 2023-05-24 DIAGNOSIS — D64.9 ANEMIA, UNSPECIFIED TYPE: ICD-10-CM

## 2023-05-24 DIAGNOSIS — I25.10 CORONARY ARTERY DISEASE INVOLVING NATIVE HEART WITHOUT ANGINA PECTORIS, UNSPECIFIED VESSEL OR LESION TYPE: ICD-10-CM

## 2023-05-24 DIAGNOSIS — M15.9 OSTEOARTHRITIS OF MULTIPLE JOINTS, UNSPECIFIED OSTEOARTHRITIS TYPE: ICD-10-CM

## 2023-05-24 DIAGNOSIS — M79.7 FIBROMYALGIA: ICD-10-CM

## 2023-05-24 DIAGNOSIS — Z71.89 ADVANCED DIRECTIVES, COUNSELING/DISCUSSION: ICD-10-CM

## 2023-05-24 DIAGNOSIS — I70.0 AORTIC CALCIFICATION (H): ICD-10-CM

## 2023-05-24 DIAGNOSIS — F02.B2 MODERATE LATE ONSET ALZHEIMER'S DEMENTIA WITH PSYCHOTIC DISTURBANCE (H): Primary | ICD-10-CM

## 2023-05-24 PROCEDURE — 99344 HOME/RES VST NEW MOD MDM 60: CPT | Performed by: NURSE PRACTITIONER

## 2023-05-24 RX ORDER — PANTOPRAZOLE SODIUM 40 MG/1
40 TABLET, DELAYED RELEASE ORAL DAILY
COMMUNITY
End: 2024-04-26

## 2023-05-24 RX ORDER — ONDANSETRON 4 MG/1
4 TABLET, FILM COATED ORAL EVERY 8 HOURS PRN
COMMUNITY
End: 2024-05-15

## 2023-05-24 RX ORDER — RIVASTIGMINE TARTRATE 3 MG/1
3 CAPSULE ORAL 2 TIMES DAILY
COMMUNITY
End: 2024-02-27

## 2023-05-24 RX ORDER — ASPIRIN 325 MG
325 TABLET, DELAYED RELEASE (ENTERIC COATED) ORAL DAILY
COMMUNITY
End: 2023-05-25 | Stop reason: DRUGHIGH

## 2023-05-24 RX ORDER — CHOLECALCIFEROL (VITAMIN D3) 50 MCG
100 TABLET ORAL DAILY
COMMUNITY
End: 2023-05-25 | Stop reason: DRUGHIGH

## 2023-05-24 RX ORDER — SENNOSIDES 8.6 MG
2 TABLET ORAL DAILY PRN
COMMUNITY

## 2023-05-24 RX ORDER — MUPIROCIN 20 MG/G
OINTMENT TOPICAL 2 TIMES DAILY PRN
COMMUNITY
End: 2024-01-31

## 2023-05-24 RX ORDER — FERROUS SULFATE 325(65) MG
325 TABLET ORAL EVERY OTHER DAY
COMMUNITY
End: 2023-06-21

## 2023-05-24 NOTE — PROGRESS NOTES
"[facility]    PRIMARY CARE PROVIDER AND CLINIC:  ENID Taylor CNP, [address] [address]  Chief Complaint   Patient presents with     [facility] Medical Record Number:  [id number]  Place of Service where encounter took place:  [facility] ([facility] [269]    Elzbieta Ivan  is a 82 year old  (1940), living in above facility since 6/22/2022 and now choosing to change PCPs to [facility].  Initially in AL and now on Memory Care.     HPI:    Medical history significant for dementia, major depression, anxiety, suicidal ideation, anemia, GERD, CAD, hyperlipidemia, Hashimoto's,  essential tremor, fibromyalgia, osteoarthritis, osteoporosis.   She was hospitalized on the [facility] 11/28-12/6/2022 with suicidal ideation and a plan. Cymbalta was decreased from 120 mg daily to 90 mg due to potential side effects of mood instability and irritability. Xanax was dc'd. Mirtazapine and prn seroquel were started. She returned to the facility and moved from AL to Memory Care for a higher level of care.   She was seen by Dr Giang at [facility] 3/7/2023 and rivastigmine was started. Note indicates that Neuropsych testing showed memory markedly affected,  executive abilities moderately affected, verbal processing mild to moderately affected, nonverbal processing minimally affected. Findings consistent with Alzheimer's disease.     She is a poor historian. She's anxious and conversation is disorganized and repetitive.  She talks about the hospitalization in Dec and a suicide attempt when she was \"younger.\"  Denies thoughts of self harm or a plan. States that she feels \"trapped\" and \"fireworks are going off in my head.\" She's aware that she has \"Alzheimer's\" but feels she can live independently. She has a small dog and wants to walk outside. Denies feeling physically ill. Good appetite. Denies pain. Sleep is poor.   She has made " "comments to staff about bugs and worms in her apartment and burrowing in her skin. Seroquel prn has been used 4 times in the past month.   Spoke with her sister/POA Bacilio Ivan, who reports a long history of anxiety,depression and \"drama.\" During one of her visits, patient had 16 pill cups with small pieces of lint or dust in them and thought they were bugs. She has also commented to Bacilio about bugs and worms in her skin. Bacilio reports that episodes of anxiety and agitation can last for 8-10 hrs and patient will vomit any meds that are given when she is anxious.     CODE STATUS/ADVANCE DIRECTIVES DISCUSSION:  Prior  DNR / DNI  ALLERGIES:   Allergies   Allergen Reactions     Cat Hair Extract      Scopolamine Other (See Comments)     Hallucianations      PAST MEDICAL HISTORY:   Past Medical History:   Diagnosis Date     Anemia      Anxiety      Arrhythmia     hx of SVT     Arthritis      CAD (coronary artery disease)      Cardiomegaly      Cervical cancer (H) 1960     Dementia (H)      Dysphagia      Essential tremor      Fibromyalgia      Gastroesophageal reflux disease      GERD (gastroesophageal reflux disease)      Goiter, nodular      Hashimoto's disease      Hyperlipidemia      LVH (left ventricular hypertrophy)      Major depression      Malignant neoplasm of cervix (H)      Migraine      Non-toxic nodular goiter      Osteoporosis      Polyp of vagina      Proximal humerus fracture 2018    left     Pulmonary nodules      Sleep apnea     intolerant of CPAP     Suicidal ideation 2022     SVT (supraventricular tachycardia) (H) 2017    resolved     Synovial cyst     lumbar spine      PAST SURGICAL HISTORY:   has a past surgical history that includes Eye surgery;  section; appendectomy; GYN surgery; orthopedic surgery (Left); tonsillectomy; puncture aspiration abscess/hematoma/cyst; Reverse arthroplasty shoulder (Left, 2018); Laminectomy lumbar one level (Bilateral, 2020); Lumbar " Laminectomy (07/21/2017); Liposuction (location) (2014); and Synovial Cyst Excision.  FAMILY HISTORY: family history includes Bladder Cancer in her mother; Dementia in her father and mother; Rheumatoid Arthritis in her father.  SOCIAL HISTORY:   reports that she quit smoking about 21 years ago. Her smoking use included cigarettes. She has never used smokeless tobacco. She reports that she does not currently use alcohol. She reports that she does not use drugs.  Patient's living condition: lives in an assisted living facility. , no children. Retired psychologist in private practice.       Current Outpatient Medications   Medication Sig     aspirin 81 MG EC tablet Take 1 tablet (81 mg) by mouth daily     Biotin 5000 MCG CAPS Take 1 capsule by mouth daily     DULoxetine (CYMBALTA) 30 MG capsule Take 3 capsules (90 mg) by mouth daily     ferrous sulfate (FEROSUL) 325 (65 Fe) MG tablet Take 325 mg by mouth every other day     mirtazapine (REMERON) 15 MG tablet Take 0.5 tablets (7.5 mg) by mouth At Bedtime     mupirocin (BACTROBAN) 2 % external ointment Apply topically 2 times daily as needed (nasal crusting)     ondansetron (ZOFRAN) 4 MG tablet Take 4 mg by mouth every 8 hours as needed for nausea     pantoprazole (PROTONIX) 40 MG EC tablet Take 40 mg by mouth daily     QUEtiapine (SEROQUEL) 25 MG tablet 12.5 mg po at HS plus 12.5 mg daily prn dementia with psychosis     rivastigmine (EXELON) 3 MG capsule Take 3 mg by mouth 2 times daily     sennosides (SENOKOT) 8.6 MG tablet Take 2 tablets by mouth daily as needed for constipation     simvastatin (ZOCOR) 40 MG tablet Take 40 mg by mouth daily      Vitamin D3 (CHOLECALCIFEROL) 25 mcg (1000 units) tablet Take 1 tablet (25 mcg) by mouth daily     No current facility-administered medications for this visit.       ROS:  Limited due to dementia. Positives as noted under HPI     Vitals:  /77   Pulse 97   Temp 97.2  F (36.2  C)   Resp 18   Wt 58.1 kg (128 lb  1.6 oz)   BMI 25.02 kg/m    Exam:  GENERAL APPEARANCE:  Alert, in no distress  ENT:  [place], oropharynx clear  EYES:  conjunctiva and lids normal  NECK:  no adenopathy, no thyromegaly  RESP:  lungs clear to auscultation  CV:  regular rate and rhythm, no murmur,  no edema  ABDOMEN:  soft, non-tender, no distension, no masses  M/S:   gait steady. EVANGELISTA with good strength. No joint inflammation   SKIN:  no rashes or open areas  PSYCH:  oriented to self, place, situation, insight and judgement impaired, memory impaired , word finding difficulty, anxious    Lab/Diagnostic data:  Recent labs in [facility] reviewed by me today.      MRI brain 9/13/2022:  IMPRESSION:   1. No evidence for acute infarction, abnormal intracranial enhancement or intracranial mass.   2. Increased mild to moderate cerebral and cerebellar volume loss.   3. Increased mild to moderate chronic small vessel ischemic changes as above.        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   Plan: start seroquel 12.5 mg HS plus daily prn. Continue mirtazapine and duloxetine as below. Memory Care staff assistance with cares, meals, activities, med admin  Discussed with her sister/ANAMARIAA Bacilio Ivan, who agrees with plan of care.   Discussed with staff.     (F33.41) Recurrent major depressive disorder, in partial remission (H)  Comment: compounded by cognitive decline and loss of coping skills. No suicidal ideation today   Sister no longer able to take her out for Psych appointments.   Plan: continue mirtazapine 7.5 mg HS, duloxetine 90 mg daily.     (I25.10) Coronary artery disease involving native heart without angina pectoris, unspecified vessel or lesion type  (I51.7) LVH (left ventricular hypertrophy)  (I70.0) Aortic calcification (H)  (E78.5)  Hyperlipidemia, unspecified hyperlipidemia type  Comment: no chest pain or acute issues  Lipid panel 3/7/2022 acceptable   Extensive abdominal aortic calcification noted on DEXA 2020. No history of cardiac event or CVA  Plan: decrease ASA EC to 81 mg daily, continue statin.     (D64.9) Anemia, unspecified type  Comment: Hgb 13.3, ferritin 85 on 5/10/2022  Plan: CBC, CMP.  If Hgb within range, discontinue ferrous sulfate     (K21.9) Gastroesophageal reflux disease, unspecified whether esophagitis present  Comment: symptoms managed   Note history of dysphagia. She had esophageal manometry and EGD 2016  Plan: continue pantoprazole. Taper dose as able     (M81.0) Age-related osteoporosis without current pathological fracture  Comment: per DEXA 7/16/2021. Previously on alendronate and received Reclast in 5/2021.   Plan: decrease vitamin D to 1000 units daily. Dietary calcium.     (M79.7) Fibromyalgia  (M15.9) Osteoarthritis of multiple joints, unspecified osteoarthritis type  Comment: no significant pain issues   Plan: add tylenol if needed     (Z71.89) Advanced directives, counseling/discussion  Comment: goals of care discussed with her sister, who confirms DNR/DNI. POLST and healthcare directive on file   Plan: orders updated         Electronically signed by:  ENID Taylor CNP

## 2023-05-24 NOTE — LETTER
2023        RE: Elzbieta Ivan  [address]  [place] [address]        [facility]    PRIMARY CARE PROVIDER AND CLINIC:  ENID Taylor CNP, [address] [facility] [address]***  Chief Complaint   Patient presents with    [facility] Medical Record Number:  [id number]  Place of Service where encounter took place:  [facility] (jail) [269]    Elzbieta Ivan  is a 82 year old  (1940), living in above facility since 2022 and now choosing to change PCPs to [facility]. .   HPI:    ***    CODE STATUS/ADVANCE DIRECTIVES DISCUSSION:  Prior  DNR / DNI  ALLERGIES:   Allergies   Allergen Reactions    Cat Hair Extract     Scopolamine Other (See Comments)     Hallucianations      PAST MEDICAL HISTORY:   Past Medical History:   Diagnosis Date    Anxiety     Arrhythmia     hx of SVT    Arthritis     Dysphagia     Fibromyalgia     Gastroesophageal reflux disease     Hashimoto's disease     Hyperlipidemia     Malignant neoplasm of cervix (H)     Migraine     Non-toxic nodular goiter     Osteoporosis     Polyp of vagina     Sleep apnea       PAST SURGICAL HISTORY:   has a past surgical history that includes Eye surgery;  section; appendectomy; GYN surgery; orthopedic surgery; tonsillectomy; puncture aspiration abscess/hematoma/cyst; Reverse arthroplasty shoulder (Left, 2018); and Laminectomy lumbar one level (Bilateral, 2020).  FAMILY HISTORY: family history is not on file.  SOCIAL HISTORY:   reports that she has never smoked. She has never used smokeless tobacco. She reports that she does not drink alcohol and does not use drugs.  Patient's living condition: lives in an assisted living facility    Post Discharge Medication Reconciliation Status:   MED REC REQUIRED{TIP  Click the link below to document or use med rec list, use list to pull in response :[id number]}  Post Medication Reconciliation Status: {MED REC  LIST:932029}       Current Outpatient Medications   Medication Sig    aspirin (ASA) 325 MG EC tablet Take 325 mg by mouth daily    Biotin 5000 MCG CAPS Take 1 capsule by mouth daily    DULoxetine (CYMBALTA) 30 MG capsule Take 3 capsules (90 mg) by mouth daily    ferrous sulfate (FEROSUL) 325 (65 Fe) MG tablet Take 325 mg by mouth daily (with breakfast)    mirtazapine (REMERON) 15 MG tablet Take 0.5 tablets (7.5 mg) by mouth At Bedtime    mupirocin (BACTROBAN) 2 % external ointment Apply topically 2 times daily as needed    ondansetron (ZOFRAN) 4 MG tablet Take 4 mg by mouth every 8 hours as needed for nausea    pantoprazole (PROTONIX) 40 MG EC tablet Take 40 mg by mouth daily    QUEtiapine (SEROQUEL) 25 MG tablet Take 0.5 tablets (12.5 mg) by mouth 2 times daily as needed (severe anxiety)    rivastigmine (EXELON) 3 MG capsule Take 3 mg by mouth 2 times daily    sennosides (SENOKOT) 8.6 MG tablet Take 2 tablets by mouth daily as needed for constipation    simvastatin (ZOCOR) 40 MG tablet Take 40 mg by mouth daily     vitamin D3 (CHOLECALCIFEROL) 50 mcg (2000 units) tablet Take 100 mcg by mouth daily    aspirin-acetaminophen-caffeine (EXCEDRIN EXTRA STRENGTH) 250-250-65 MG per tablet Take 1 tablet by mouth every 8 hours as needed  (Patient not taking: Reported on 5/24/2023)    cholecalciferol (VITAMIN D3) 1000 units (25 mcg) capsule Take 2 capsules (2,000 Units) by mouth daily (Patient not taking: Reported on 5/24/2023)    DICLOFENAC PO Take 1 tablet by mouth daily as needed Filled with [employer] 2017. Unable to confirm dose. (Patient not taking: Reported on 5/24/2023)    ipratropium (ATROVENT) 0.06 % nasal spray Spray 2 sprays into both nostrils daily as needed for rhinitis (Patient not taking: Reported on 5/24/2023)    Omeprazole (PRILOSEC PO) Take 20 mg by mouth daily  (Patient not taking: Reported on 5/24/2023)    polyethylene glycol 0.4%- propylene glycol 0.3% (SYSTANE ULTRA) 0.4-0.3 % SOLN ophthalmic solution  Place 1 drop into both eyes daily as needed for dry eyes (Patient not taking: Reported on 5/24/2023)     No current facility-administered medications for this visit.       ROS:  {ROS FGS:360406}    Vitals:  /77   Pulse 97   Temp 97.2  F (36.2  C)   Resp 18   Wt 58.1 kg (128 lb 1.6 oz)   BMI 25.02 kg/m    Exam:  {Nursing home physical exam :156955}    Lab/Diagnostic data:  {fgslab:868811}    ASSESSMENT/PLAN:    {FGS DX2:144516}    Orders:  {fgsorders:566166}  ***    Total time spent with patient visit at the skilled nursing facility was {1/2/3/4/5:432916} including {1/2/3/4/5:491761}. Greater than 50% of total time spent with counseling and coordinating care due to ***.     Electronically signed by:  Jonah Hernández ***                    Sincerely,        ENID Taylor CNP

## 2023-05-25 RX ORDER — QUETIAPINE FUMARATE 25 MG/1
TABLET, FILM COATED ORAL
Qty: 30 TABLET | Refills: 11 | Status: SHIPPED | OUTPATIENT
Start: 2023-05-25 | End: 2023-06-21

## 2023-05-25 RX ORDER — ASPIRIN 81 MG/1
81 TABLET ORAL DAILY
Qty: 30 TABLET | Refills: 11 | Status: SHIPPED | OUTPATIENT
Start: 2023-05-25 | End: 2024-04-26

## 2023-05-25 RX ORDER — VITAMIN B COMPLEX
1 TABLET ORAL DAILY
Qty: 30 TABLET | Refills: 11 | Status: SHIPPED | OUTPATIENT
Start: 2023-05-25 | End: 2024-04-26

## 2023-05-25 NOTE — PROGRESS NOTES
Elzbieta Ivan   1940    Orders 2023:  1. Labs next routine lab day: CBC, CMP  (anemia)  2. DISCONTINUE current seroquel order  3. Start seroquel 12.5 mg po at HS plus 12.5 mg po daily prn dementia with psychosis, agitation  4. Decrease ASA to ASA EC 81 mg po daily for CAD  5. Decrease vitamin D to 1000 units po daily for osteoporosis  Scripts sent to pharmacy  Discussed with her sister        Cornell Calvo ENID Junior CNP on 2023 at 5:29 PM

## 2023-05-28 ENCOUNTER — LAB REQUISITION (OUTPATIENT)
Dept: LAB | Facility: CLINIC | Age: 83
End: 2023-05-28
Payer: COMMERCIAL

## 2023-05-28 DIAGNOSIS — D64.9 ANEMIA, UNSPECIFIED: ICD-10-CM

## 2023-05-30 LAB
ALBUMIN SERPL BCG-MCNC: 4.4 G/DL (ref 3.5–5.2)
ALP SERPL-CCNC: 49 U/L (ref 35–104)
ALT SERPL W P-5'-P-CCNC: 19 U/L (ref 10–35)
ANION GAP SERPL CALCULATED.3IONS-SCNC: 13 MMOL/L (ref 7–15)
AST SERPL W P-5'-P-CCNC: 28 U/L (ref 10–35)
BASOPHILS # BLD AUTO: 0.1 10E3/UL (ref 0–0.2)
BASOPHILS NFR BLD AUTO: 1 %
BILIRUB SERPL-MCNC: 0.6 MG/DL
BUN SERPL-MCNC: 17.1 MG/DL (ref 8–23)
CALCIUM SERPL-MCNC: 9.5 MG/DL (ref 8.8–10.2)
CHLORIDE SERPL-SCNC: 106 MMOL/L (ref 98–107)
CREAT SERPL-MCNC: 0.98 MG/DL (ref 0.51–0.95)
DEPRECATED HCO3 PLAS-SCNC: 26 MMOL/L (ref 22–29)
EOSINOPHIL # BLD AUTO: 0.3 10E3/UL (ref 0–0.7)
EOSINOPHIL NFR BLD AUTO: 3 %
ERYTHROCYTE [DISTWIDTH] IN BLOOD BY AUTOMATED COUNT: 13.9 % (ref 10–15)
GFR SERPL CREATININE-BSD FRML MDRD: 58 ML/MIN/1.73M2
GLUCOSE SERPL-MCNC: 75 MG/DL (ref 70–99)
HCT VFR BLD AUTO: 44.2 % (ref 35–47)
HGB BLD-MCNC: 13.3 G/DL (ref 11.7–15.7)
IMM GRANULOCYTES # BLD: 0 10E3/UL
IMM GRANULOCYTES NFR BLD: 0 %
LYMPHOCYTES # BLD AUTO: 3.2 10E3/UL (ref 0.8–5.3)
LYMPHOCYTES NFR BLD AUTO: 38 %
MCH RBC QN AUTO: 29.3 PG (ref 26.5–33)
MCHC RBC AUTO-ENTMCNC: 30.1 G/DL (ref 31.5–36.5)
MCV RBC AUTO: 97 FL (ref 78–100)
MONOCYTES # BLD AUTO: 0.6 10E3/UL (ref 0–1.3)
MONOCYTES NFR BLD AUTO: 7 %
NEUTROPHILS # BLD AUTO: 4.2 10E3/UL (ref 1.6–8.3)
NEUTROPHILS NFR BLD AUTO: 51 %
NRBC # BLD AUTO: 0 10E3/UL
NRBC BLD AUTO-RTO: 0 /100
PLATELET # BLD AUTO: 307 10E3/UL (ref 150–450)
POTASSIUM SERPL-SCNC: 4 MMOL/L (ref 3.4–5.3)
PROT SERPL-MCNC: 6.3 G/DL (ref 6.4–8.3)
RBC # BLD AUTO: 4.54 10E6/UL (ref 3.8–5.2)
SODIUM SERPL-SCNC: 145 MMOL/L (ref 136–145)
WBC # BLD AUTO: 8.3 10E3/UL (ref 4–11)

## 2023-05-30 PROCEDURE — 36415 COLL VENOUS BLD VENIPUNCTURE: CPT | Mod: ORL | Performed by: NURSE PRACTITIONER

## 2023-05-30 PROCEDURE — 85025 COMPLETE CBC W/AUTO DIFF WBC: CPT | Mod: ORL | Performed by: NURSE PRACTITIONER

## 2023-05-30 PROCEDURE — P9604 ONE-WAY ALLOW PRORATED TRIP: HCPCS | Mod: ORL | Performed by: NURSE PRACTITIONER

## 2023-05-30 PROCEDURE — 80053 COMPREHEN METABOLIC PANEL: CPT | Mod: ORL | Performed by: NURSE PRACTITIONER

## 2023-05-31 PROBLEM — G30.1 MODERATE LATE ONSET ALZHEIMER'S DEMENTIA WITH PSYCHOTIC DISTURBANCE (H): Status: ACTIVE | Noted: 2023-05-31

## 2023-05-31 PROBLEM — F02.B2 MODERATE LATE ONSET ALZHEIMER'S DEMENTIA WITH PSYCHOTIC DISTURBANCE (H): Status: ACTIVE | Noted: 2023-05-31

## 2023-05-31 PROBLEM — G25.0 ESSENTIAL TREMOR: Status: ACTIVE | Noted: 2023-05-31

## 2023-05-31 PROBLEM — D64.9 ANEMIA: Status: ACTIVE | Noted: 2023-05-31

## 2023-05-31 PROBLEM — I70.0 AORTIC CALCIFICATION (H): Status: ACTIVE | Noted: 2023-05-31

## 2023-05-31 PROBLEM — K21.9 GERD (GASTROESOPHAGEAL REFLUX DISEASE): Status: ACTIVE | Noted: 2023-05-31

## 2023-05-31 PROBLEM — M81.0 AGE-RELATED OSTEOPOROSIS WITHOUT CURRENT PATHOLOGICAL FRACTURE: Status: ACTIVE | Noted: 2023-05-31

## 2023-05-31 PROBLEM — I51.7 LVH (LEFT VENTRICULAR HYPERTROPHY): Status: ACTIVE | Noted: 2023-05-31

## 2023-05-31 PROBLEM — F33.41 RECURRENT MAJOR DEPRESSIVE DISORDER, IN PARTIAL REMISSION (H): Status: ACTIVE | Noted: 2023-05-31

## 2023-05-31 PROBLEM — R45.851 SUICIDAL IDEATION: Status: ACTIVE | Noted: 2023-05-31

## 2023-05-31 PROBLEM — M79.7 FIBROMYALGIA: Status: ACTIVE | Noted: 2023-05-31

## 2023-05-31 PROBLEM — M15.9 OSTEOARTHRITIS OF MULTIPLE JOINTS, UNSPECIFIED OSTEOARTHRITIS TYPE: Status: ACTIVE | Noted: 2023-05-31

## 2023-05-31 PROBLEM — I25.10 CAD (CORONARY ARTERY DISEASE): Status: ACTIVE | Noted: 2023-05-31

## 2023-06-15 ENCOUNTER — TELEPHONE (OUTPATIENT)
Dept: GERIATRICS | Facility: CLINIC | Age: 83
End: 2023-06-15
Payer: COMMERCIAL

## 2023-06-15 DIAGNOSIS — R52 PAIN: Primary | ICD-10-CM

## 2023-06-15 RX ORDER — ACETAMINOPHEN 325 MG/1
650 TABLET ORAL EVERY 6 HOURS PRN
Qty: 30 TABLET | Refills: 11 | Status: SHIPPED | OUTPATIENT
Start: 2023-06-15 | End: 2024-03-28

## 2023-06-15 NOTE — TELEPHONE ENCOUNTER
Elzbieta Ivan   1940    Orders 6/15/2023:  1. Tylenol 650 mg po every 6 hrs prn pain/headache  Sent to pharmacy    ENID Taylor CNP on 6/15/2023 at 4:19 PM

## 2023-06-21 ENCOUNTER — ASSISTED LIVING VISIT (OUTPATIENT)
Dept: GERIATRICS | Facility: CLINIC | Age: 83
End: 2023-06-21
Payer: COMMERCIAL

## 2023-06-21 VITALS
RESPIRATION RATE: 22 BRPM | SYSTOLIC BLOOD PRESSURE: 121 MMHG | TEMPERATURE: 97.9 F | HEIGHT: 60 IN | OXYGEN SATURATION: 98 % | BODY MASS INDEX: 24.94 KG/M2 | WEIGHT: 127 LBS | DIASTOLIC BLOOD PRESSURE: 82 MMHG | HEART RATE: 90 BPM

## 2023-06-21 DIAGNOSIS — G30.1 MODERATE LATE ONSET ALZHEIMER'S DEMENTIA WITH PSYCHOTIC DISTURBANCE (H): ICD-10-CM

## 2023-06-21 DIAGNOSIS — F33.41 RECURRENT MAJOR DEPRESSIVE DISORDER, IN PARTIAL REMISSION (H): ICD-10-CM

## 2023-06-21 DIAGNOSIS — F02.B2 MODERATE LATE ONSET ALZHEIMER'S DEMENTIA WITH PSYCHOTIC DISTURBANCE (H): ICD-10-CM

## 2023-06-21 DIAGNOSIS — D64.9 ANEMIA, UNSPECIFIED TYPE: ICD-10-CM

## 2023-06-21 DIAGNOSIS — M81.0 AGE-RELATED OSTEOPOROSIS WITHOUT CURRENT PATHOLOGICAL FRACTURE: ICD-10-CM

## 2023-06-21 DIAGNOSIS — I25.10 CORONARY ARTERY DISEASE INVOLVING NATIVE HEART WITHOUT ANGINA PECTORIS, UNSPECIFIED VESSEL OR LESION TYPE: ICD-10-CM

## 2023-06-21 DIAGNOSIS — R44.3 HALLUCINATIONS: Primary | ICD-10-CM

## 2023-06-21 PROCEDURE — 99349 HOME/RES VST EST MOD MDM 40: CPT | Performed by: INTERNAL MEDICINE

## 2023-06-21 RX ORDER — QUETIAPINE FUMARATE 25 MG/1
TABLET, FILM COATED ORAL
Qty: 30 TABLET | Refills: 11 | Status: SHIPPED | OUTPATIENT
Start: 2023-06-21 | End: 2023-07-19 | Stop reason: DRUGHIGH

## 2023-06-21 NOTE — LETTER
"    6/21/2023        RE: Elzbieta Ivan  [address]  [place] [address]        Elzbieta Ivan is a 82 year old female seen June 21, 2023 at [facility] unit where she has resided for one year (admit to AL 6/2022, moved to Memory Care 12/2022) recently turned over to [facility] and seen for initial visit.   Pt is seen in her apartment, up ambulating about without assistive device.   Gives a repetitive and tangential history.   States she has \"a scared feeling\"   Comes and goes, has had for many years.\"   Very upset that she is in secure Memory Care unit, can't go out for walks with her little dog: \"I'm not allowed up the stairs by myself.\"   Refers to the November hospitalization for SI as \"something went off in my brain, like red lightning flashes, and that's why I'm here.\"     States she sleeps well, appetite not good.     She has reported visual and tactile hallucinations.     Talked with sister Bacilio who feels pt has improved since addition of quetiapine to her regimen   She is pt's only living relative, and is decision maker.      By chart review, pt has a CAD, Hashimoto's thyroiditis, tremor, osteoporosis and GERD.   In November 2022 she had an inpatient Psychiatric stay at [facility] unit for suicidal ideation with a plan.   Medications were adjusted and she moved to Memory Care at that time.     Pt underwent Neuropsychiatric testing in March 2023, and that showed poor memory, decreased executive function, verbal processing difficulties, all c/w dx of Alzheimer's dementia   She was started on rivastigmine.       Past Medical History:   Diagnosis Date     Anemia      Anxiety      Arrhythmia     hx of SVT     Arthritis      CAD (coronary artery disease)      Cardiomegaly      Cervical cancer (H) 1960     Dementia (H)      Dysphagia      Essential tremor      Fibromyalgia      Gastroesophageal reflux disease      GERD (gastroesophageal reflux disease)      Goiter, nodular      " Hashimoto's disease      Hyperlipidemia      LVH (left ventricular hypertrophy)      Major depression      Malignant neoplasm of cervix (H)      Migraine      Non-toxic nodular goiter      Osteoporosis      Polyp of vagina      Proximal humerus fracture 2018    left     Pulmonary nodules      Sleep apnea     intolerant of CPAP     Suicidal ideation 2022     SVT (supraventricular tachycardia) (H) 2017    resolved     Synovial cyst     lumbar spine       Past Surgical History:   Procedure Laterality Date     APPENDECTOMY        SECTION       EYE SURGERY      Bilat blephplsty     GYN SURGERY      hysterectomy with single oophrectomy     LAMINECTOMY LUMBAR ONE LEVEL Bilateral 2020    Procedure: LUMBAR 5 LAMINECTOMY BILATERALLY FOR EXCISION OF A LARGE SYNOVIAL CYST;  Surgeon: Antwan Burkett MD;  Location: [facility] OR     LIPOSUCTION (LOCATION)       LUMBAR LAMINECTOMY  2017     ORTHOPEDIC SURGERY Left     knee arthroscopy     PUNCTURE ASPIRATION ABSCESS/HEMATOMA/CYST       REVERSE ARTHROPLASTY SHOULDER Left 2018    Procedure: REVERSE ARTHROPLASTY SHOULDER;  LEFT REVERSE TOTAL SHOULDER ARTHROPLASTY;  Surgeon: Con Tucker MD;  Location: [facility] OR     SYNOVIAL CYST EXCISION       TONSILLECTOMY       SH: Single   Only relative is her sister Bacilio (who has pancreatic cancer)    Pt has a long time friend Wisam who visits and takes her for walks   Has also been friends with the [employer] for 30 years   Pt is a retired Psychologist, had a clinic in [facility]   Past smoker >40 pack year history      ROS:   Today weight 124 lbs   Wt Readings from Last 5 Encounters:   23 57.6 kg (127 lb)   23 58.1 kg (128 lb 1.6 oz)   22 53.7 kg (118 lb 6.4 oz)   20 62.6 kg (138 lb)   18 63.8 kg (140 lb 10.5 oz)      EXAM: NAD  /82   Pulse 90   Temp 97.9  F (36.6  C)   Resp 22   Ht 1.524 m (5')   Wt 57.6 kg (127 lb)   SpO2 98%   BMI 24.80 kg/m      Neck supple without adenopathy  Lungs clear bilaterally with fair air movement  Heart RRR s1s2 @80   Abd soft, NT, no distention or guarding, +BS   Ext with trace edema  Neuro: rises easily and quickly from her chair, ambulates without device   Repetitive, disjointed history   Psych: affect labile.     Lab Results   Component Value Date     05/30/2023    POTASSIUM 4.0 05/30/2023    CHLORIDE 106 05/30/2023    CO2 26 05/30/2023    ANIONGAP 13 05/30/2023    GLC 75 05/30/2023    BUN 17.1 05/30/2023    CR 0.98 (H) 05/30/2023    GFRESTIMATED 58 (L) 05/30/2023    KALANI 9.5 05/30/2023     Lab Results   Component Value Date    AST 28 05/30/2023      ALBUMIN 4.4 05/30/2023      ALKPHOS 49 05/30/2023     Lab Results   Component Value Date    WBC 8.3 05/30/2023      HGB 13.3 05/30/2023      MCV 97 05/30/2023       05/30/2023      Brain MRI 9/2022:    FINDINGS: Increased now mild to moderate cerebral and cerebellar volume loss. Increased mild to moderate chronic small vessel ischemic foci within the deep and subcortical white matter of the cerebral hemispheres bilaterally. No evidence for intracranial mass, mass effect, abnormal enhancement, acute infarction or flow void  abnormality. Paranasal sinuses, mastoid air cell regions, sellar region and craniocervical junction otherwise unremarkable.   IMPRESSION:   1. No evidence for acute infarction, abnormal intracranial enhancement or intracranial mass.   2. Increased mild to moderate cerebral and cerebellar volume loss.   3. Increased mild to moderate chronic small vessel ischemic changes as above.      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   Reviewed with her sister who feels this has made a significant difference and okay with dose increase to further lower pt's distress  [facility] unit for assist with med admin, meals, activity and secure unit    Continue rivastigmine 3 mg/ bid   Monitor for GI or cardiac side effects     (F33.41) Recurrent major depressive disorder, in partial remission (H)  Comment: with recent suicidal ideation     Plan: duloxetine 90 mg/day, mirtazapine 7.5 mg/HS   Follow up with Psychiatry Dr Daya Sosa     (I25.10) Coronary artery disease involving native heart without angina pectoris, unspecified vessel or lesion type  Comment: by imaging, no h/o event or symptoms   Plan: aspirin 81 mg/day and simvastatin 40 mg/day for secondary prevention     (M81.0) Age-related osteoporosis without current pathological fracture  Comment: DEXA in 2021 showed progression of osteoporosis despite alendronate tx  Received Reclast in 2021     Plan: vit D 25 mcg/day, dietary calcium   Will defer any further bisphosphonate or other tx until CNS issues are more stable       (D64.9) Anemia, unspecified type  Comment: resolved  Plan: discontinue Fe      Advance directive: DNR/DNI per signed POLST Julia Worley MD         Sincerely,        Julia Worley MD

## 2023-07-19 ENCOUNTER — ASSISTED LIVING VISIT (OUTPATIENT)
Dept: GERIATRICS | Facility: CLINIC | Age: 83
End: 2023-07-19
Payer: COMMERCIAL

## 2023-07-19 VITALS
RESPIRATION RATE: 22 BRPM | DIASTOLIC BLOOD PRESSURE: 82 MMHG | SYSTOLIC BLOOD PRESSURE: 121 MMHG | BODY MASS INDEX: 24.8 KG/M2 | HEART RATE: 90 BPM | TEMPERATURE: 97.9 F | WEIGHT: 127 LBS

## 2023-07-19 DIAGNOSIS — F02.B2 MODERATE LATE ONSET ALZHEIMER'S DEMENTIA WITH PSYCHOTIC DISTURBANCE (H): Primary | ICD-10-CM

## 2023-07-19 DIAGNOSIS — K21.9 GASTROESOPHAGEAL REFLUX DISEASE, UNSPECIFIED WHETHER ESOPHAGITIS PRESENT: ICD-10-CM

## 2023-07-19 DIAGNOSIS — M15.9 OSTEOARTHRITIS OF MULTIPLE JOINTS, UNSPECIFIED OSTEOARTHRITIS TYPE: ICD-10-CM

## 2023-07-19 DIAGNOSIS — F33.41 RECURRENT MAJOR DEPRESSIVE DISORDER, IN PARTIAL REMISSION (H): ICD-10-CM

## 2023-07-19 DIAGNOSIS — F22 DELUSIONS (H): ICD-10-CM

## 2023-07-19 DIAGNOSIS — I51.7 LVH (LEFT VENTRICULAR HYPERTROPHY): ICD-10-CM

## 2023-07-19 DIAGNOSIS — I25.10 CORONARY ARTERY DISEASE INVOLVING NATIVE HEART WITHOUT ANGINA PECTORIS, UNSPECIFIED VESSEL OR LESION TYPE: ICD-10-CM

## 2023-07-19 DIAGNOSIS — D64.9 ANEMIA, UNSPECIFIED TYPE: ICD-10-CM

## 2023-07-19 DIAGNOSIS — R44.3 HALLUCINATIONS: ICD-10-CM

## 2023-07-19 DIAGNOSIS — G30.1 MODERATE LATE ONSET ALZHEIMER'S DEMENTIA WITH PSYCHOTIC DISTURBANCE (H): Primary | ICD-10-CM

## 2023-07-19 PROCEDURE — 99349 HOME/RES VST EST MOD MDM 40: CPT | Performed by: NURSE PRACTITIONER

## 2023-07-19 RX ORDER — MIRTAZAPINE 7.5 MG/1
7.5 TABLET, FILM COATED ORAL AT BEDTIME
Qty: 15 TABLET | Refills: 11 | Status: SHIPPED | OUTPATIENT
Start: 2023-07-19 | End: 2024-01-30

## 2023-07-19 RX ORDER — MIRTAZAPINE 15 MG/1
15 TABLET, FILM COATED ORAL AT BEDTIME
Qty: 30 TABLET | Refills: 11 | Status: SHIPPED | OUTPATIENT
Start: 2023-07-19 | End: 2023-07-19

## 2023-07-19 RX ORDER — QUETIAPINE FUMARATE 25 MG/1
25 TABLET, FILM COATED ORAL 2 TIMES DAILY
Qty: 60 TABLET | Refills: 11 | Status: SHIPPED | OUTPATIENT
Start: 2023-07-19 | End: 2024-05-21

## 2023-07-19 NOTE — PROGRESS NOTES
"[facility] GERIATRICS    Chief Complaint   Patient presents with     RECHECK     HPI:  Elzbieta Ivan is a 82 year old  (1940), who is being seen today for an episodic care visit at: [facility] ([facility]) [[id number]].   She has lived at this facility since 6/2022, initially in AL then moved to Memory Care 12/2022. We assumed primary care 5/2023.     Medical history significant for dementia, major depression, anxiety, suicidal ideation, anemia, GERD, CAD, hyperlipidemia, Hashimoto's,  essential tremor, fibromyalgia, osteoarthritis, osteoporosis.   She was hospitalized on the [facility] 11/28-12/6/2022 with suicidal ideation and a plan. Cymbalta was decreased from 120 mg daily to 90 mg due to potential side effects of mood instability and irritability. Xanax was dc'd. Mirtazapine and prn seroquel were started. She returned to the facility and moved to Memory Care for a higher level of care.   She was seen by Dr Giang at [facility] 3/7/2023 and rivastigmine was started. Note indicates that Neuropsych testing showed memory markedly affected,  executive abilities moderately affected, verbal processing mild to moderately affected, nonverbal processing minimally affected. Findings consistent with Alzheimer's disease.     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 Anxious and repetitive with word finding difficulty. Repeatedly states that she doesn't want to live here for the rest of her life, \"I don't want to " "die here.\" Talks about not wanting her sister, who has pancreatic cancer, to \"do too much\" for her.  Ambulates without a device. No falls. Good appetite.   Spoke with her sister/POA Bacilio Ivan, who feels her agitation has improved after seroquel was increased last month. She continues to believe there are bugs and worms in her apartment, even after the facility had an  come in. She has told Bacilio that the bugs and worms are under her skin and Bacilio has recently noticed more scratching and picking. Bacilio reports mornings are \"tough\" with increased anxiety and delusions, she's better later in the day. Per Bacilio, \"she is desperately afraid.\"     Allergies, and PMH/PSH reviewed in EPIC today    REVIEW OF SYSTEMS:  Limited due to dementia. Positives as noted under HPI     Objective:   /82   Pulse 90   Temp 97.9  F (36.6  C)   Resp 22   Wt 57.6 kg (127 lb)   BMI 24.80 kg/m     Exam unchanged   GENERAL APPEARANCE:  Alert, in no distress  ENT:  [place], oropharynx clear  EYES:  conjunctiva and lids normal  NECK:  no adenopathy, no thyromegaly  RESP:  lungs clear to auscultation  CV:  regular rate and rhythm, no murmur,  no edema  ABDOMEN:  soft, non-tender, no distension, no masses  M/S:   gait steady. EVANGELISTA with good strength. No joint inflammation   SKIN:  no rashes or open areas  PSYCH:  oriented to self, place, situation, insight and judgement impaired, memory impaired,  anxious    Recent labs in [facility] reviewed by me today.       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 Seroquel has been helpful, per sister. Patient has minimal insight re: her cognitive deficits and feels she can live independently.   Plan: increase seroquel to 25 mg bid and daily prn. Closely monitor for side effects. Continue " rivastigmine.  Memory Care staff assistance with cares, meals, activities, med admin  Her sister/POA agrees with plan of care.   Discussed with staff.     (F33.41) Recurrent major depressive disorder, in partial remission (H)  Comment: long standing and predates onset of dementia   Plan: continue duloxetine, mirtazapine. Seroquel as above.     (I25.10) Coronary artery disease involving native heart without angina pectoris, unspecified vessel or lesion type  (I51.7) LVH (left ventricular hypertrophy)  Comment: no acute symptoms.   Extensive abdominal aortic calcification noted on DEXA 2020. No history of cardiac event or CVA  Plan: continue ASA, statin     (K21.9) Gastroesophageal reflux disease, unspecified whether esophagitis present  Comment: symptoms managed   Plan: continue pantoprazole     (D64.9) Anemia, unspecified type  Comment: ferrous sulfate dc'd last month for Hgb 13.3  Plan: recheck Hgb in a few months     (M15.9) Osteoarthritis of multiple joints, unspecified osteoarthritis type  Comment: very mobile, no significant pain issues   Plan: tylenol prn           Electronically signed by: ENID Taylor CNP

## 2023-07-19 NOTE — LETTER
7/19/2023        RE: Elzbieta Ivan  [address]  [place] [address]        [facility] GERIATRICS    Chief Complaint   Patient presents with     RECHECK     HPI:  Elzbieta Ivan is a 82 year old  (1940), who is being seen today for an episodic care visit at: [facility]) [[id number]].   She has lived at this facility since 6/2022, initially in AL then moved to Memory Care 12/2022. We assumed primary care 5/2023.     Medical history significant for dementia, major depression, anxiety, suicidal ideation, anemia, GERD, CAD, hyperlipidemia, Hashimoto's,  essential tremor, fibromyalgia, osteoarthritis, osteoporosis.   She was hospitalized on the [facility] 11/28-12/6/2022 with suicidal ideation and a plan. Cymbalta was decreased from 120 mg daily to 90 mg due to potential side effects of mood instability and irritability. Xanax was dc'd. Mirtazapine and prn seroquel were started. She returned to the facility and moved to Memory Care for a higher level of care.   She was seen by Dr Giang at [facility] 3/7/2023 and rivastigmine was started. Note indicates that Neuropsych testing showed memory markedly affected,  executive abilities moderately affected, verbal processing mild to moderately affected, nonverbal processing minimally affected. Findings consistent with Alzheimer's disease.     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 Anxious and repetitive with word finding  "difficulty. Repeatedly states that she doesn't want to live here for the rest of her life, \"I don't want to die here.\" Talks about not wanting her sister, who has pancreatic cancer, to \"do too much\" for her.  Ambulates without a device. No falls. Good appetite.   Spoke with her sister/POA Bacilio Ivan, who feels her agitation has improved after seroquel was increased last month. She continues to believe there are bugs and worms in her apartment, even after the facility had an  come in. She has told Bacilio that the bugs and worms are under her skin and Bacilio has recently noticed more scratching and picking. Bacilio reports mornings are \"tough\" with increased anxiety and delusions, she's better later in the day. Per Bacilio, \"she is desperately afraid.\"     Allergies, and PMH/PSH reviewed in EPIC today    REVIEW OF SYSTEMS:  Limited due to dementia. Positives as noted under HPI     Objective:   /82   Pulse 90   Temp 97.9  F (36.6  C)   Resp 22   Wt 57.6 kg (127 lb)   BMI 24.80 kg/m     Exam unchanged   GENERAL APPEARANCE:  Alert, in no distress  ENT:  [place], oropharynx clear  EYES:  conjunctiva and lids normal  NECK:  no adenopathy, no thyromegaly  RESP:  lungs clear to auscultation  CV:  regular rate and rhythm, no murmur,  no edema  ABDOMEN:  soft, non-tender, no distension, no masses  M/S:   gait steady. EVANGELISTA with good strength. No joint inflammation   SKIN:  no rashes or open areas  PSYCH:  oriented to self, place, situation, insight and judgement impaired, memory impaired,  anxious    Recent labs in [facility] reviewed by me today.       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 Seroquel has been helpful, per sister. Patient has minimal insight re: her cognitive deficits and feels she can live " independently.   Plan: increase seroquel to 25 mg bid and daily prn. Closely monitor for side effects. Continue rivastigmine.  Memory Care staff assistance with cares, meals, activities, med admin  Her sister/POA agrees with plan of care.   Discussed with staff.     (F33.41) Recurrent major depressive disorder, in partial remission (H)  Comment: long standing and predates onset of dementia   Plan: continue duloxetine, mirtazapine. Seroquel as above.     (I25.10) Coronary artery disease involving native heart without angina pectoris, unspecified vessel or lesion type  (I51.7) LVH (left ventricular hypertrophy)  Comment: no acute symptoms.   Extensive abdominal aortic calcification noted on DEXA 2020. No history of cardiac event or CVA  Plan: continue ASA, statin     (K21.9) Gastroesophageal reflux disease, unspecified whether esophagitis present  Comment: symptoms managed   Plan: continue pantoprazole     (D64.9) Anemia, unspecified type  Comment: ferrous sulfate dc'd last month for Hgb 13.3  Plan: recheck Hgb in a few months     (M15.9) Osteoarthritis of multiple joints, unspecified osteoarthritis type  Comment: very mobile, no significant pain issues   Plan: tylenol prn           Electronically signed by: ENID Taylor CNP             Sincerely,        ENID Taylor CNP

## 2023-08-23 ENCOUNTER — ASSISTED LIVING VISIT (OUTPATIENT)
Dept: GERIATRICS | Facility: CLINIC | Age: 83
End: 2023-08-23
Payer: COMMERCIAL

## 2023-08-23 VITALS
RESPIRATION RATE: 20 BRPM | WEIGHT: 129.5 LBS | TEMPERATURE: 96.9 F | DIASTOLIC BLOOD PRESSURE: 80 MMHG | HEART RATE: 84 BPM | BODY MASS INDEX: 25.29 KG/M2 | SYSTOLIC BLOOD PRESSURE: 134 MMHG

## 2023-08-23 DIAGNOSIS — F33.41 RECURRENT MAJOR DEPRESSIVE DISORDER, IN PARTIAL REMISSION (H): ICD-10-CM

## 2023-08-23 DIAGNOSIS — F22 DELUSIONS (H): ICD-10-CM

## 2023-08-23 DIAGNOSIS — I25.10 CORONARY ARTERY DISEASE INVOLVING NATIVE HEART WITHOUT ANGINA PECTORIS, UNSPECIFIED VESSEL OR LESION TYPE: ICD-10-CM

## 2023-08-23 DIAGNOSIS — M81.0 AGE-RELATED OSTEOPOROSIS WITHOUT CURRENT PATHOLOGICAL FRACTURE: ICD-10-CM

## 2023-08-23 DIAGNOSIS — M15.9 OSTEOARTHRITIS OF MULTIPLE JOINTS, UNSPECIFIED OSTEOARTHRITIS TYPE: ICD-10-CM

## 2023-08-23 DIAGNOSIS — G30.1 MODERATE LATE ONSET ALZHEIMER'S DEMENTIA WITH PSYCHOTIC DISTURBANCE (H): Primary | ICD-10-CM

## 2023-08-23 DIAGNOSIS — F02.B2 MODERATE LATE ONSET ALZHEIMER'S DEMENTIA WITH PSYCHOTIC DISTURBANCE (H): Primary | ICD-10-CM

## 2023-08-23 DIAGNOSIS — R44.3 HALLUCINATIONS: ICD-10-CM

## 2023-08-23 DIAGNOSIS — F33.41 RECURRENT MAJOR DEPRESSIVE DISORDER, IN PARTIAL REMISSION (H): Primary | ICD-10-CM

## 2023-08-23 PROCEDURE — 99350 HOME/RES VST EST HIGH MDM 60: CPT | Performed by: NURSE PRACTITIONER

## 2023-08-23 RX ORDER — DULOXETIN HYDROCHLORIDE 60 MG/1
CAPSULE, DELAYED RELEASE ORAL
Qty: 31 CAPSULE | Refills: 11 | Status: SHIPPED | OUTPATIENT
Start: 2023-08-23 | End: 2024-09-03

## 2023-08-23 NOTE — PROGRESS NOTES
"[facility] GERIATRICS    Chief Complaint   Patient presents with    RECHECK     HPI:  Elzbieta Ivan is a 82 year old  (1940), who is being seen today for an episodic care visit at: [facility] ([facility]) [[id number]].   She has lived at this facility since 6/2022, initially in AL then moved to Memory Care 12/2022. We assumed primary care 5/2023.   Medical history significant for dementia, major depression, anxiety, suicidal ideation, anemia, GERD, CAD, hyperlipidemia, Hashimoto's,  essential tremor, fibromyalgia, osteoarthritis, osteoporosis.   She was hospitalized on the [facility] 11/28-12/6/2022 with suicidal ideation and a plan. Cymbalta was decreased from 120 mg daily to 90 mg due to potential side effects of mood instability and irritability. Xanax was dc'd. Mirtazapine and prn seroquel were started. She returned to the facility and moved to Memory Care for a higher level of care.   She was seen by Dr Giang at [facility] 3/7/2023 and rivastigmine was started. Note indicates that Neuropsych testing showed memory markedly affected,  executive abilities moderately affected, verbal processing mild to moderately affected, nonverbal processing minimally affected. Findings consistent with Alzheimer's disease.       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 She doesn't want to live on the Memory Care unit and wants to be able to go outside by herself, \" I feel trapped. I can't be here for the rest of my life.\" Reports she is " "\"surrounded by helplessness and loss\" in reference to other residents on the unit. She is higher functioning than the other residents and feels that she should help them, which is stressful. Also states that she is \"terribly worried\" about her sister, who has pancreatic cancer. Denies feeling ill. Sleeping well. Good appetite. Denies pain. Ambulates without a device. Independent with cares.   Spoke with her sister/POA Bacilio Ivan, who reports delusions and agitation have improved with seroquel. Patient has told her that she feels \"listless\" in the mornings, but no specific physical symptoms.       Allergies, and PMH/PSH reviewed in EPIC today    REVIEW OF SYSTEMS:  Limited due to dementia. Positives as noted under HPI    Objective:   /80   Pulse 84   Temp 96.9  F (36.1  C)   Resp 20   Wt 58.7 kg (129 lb 8 oz)   BMI 25.29 kg/m    GENERAL APPEARANCE:  Alert, in no distress  ENT:  [place], oropharynx clear  EYES:  conjunctiva and lids normal  NECK:  no adenopathy, no thyromegaly  RESP:  lungs clear to auscultation  CV:  regular rate and rhythm, no murmur,  no edema  ABDOMEN:  soft, non-tender, no distension, no masses  M/S:   gait steady. EVANGELISTA with good strength. No joint inflammation   SKIN:  no rashes or open areas  PSYCH:  oriented to self, place, situation, insight and judgement impaired, memory impaired, affect normal     Recent labs in [facility] reviewed by me today.     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 Discussed with her sister Bacilio Ivan, who has a good understanding of the situation. She would like patient to have more freedom, but agrees she's not safe to leave the unit unattended. We discussed getting a private , which she will look into.   Plan: continue seroquel, " mirtazapine, duloxetine. Memory Care staff assistance with cares, meals, activities, med admin. Encourage activities and socialization.   Discussed with staff.     (F33.41) Recurrent major depressive disorder, in partial remission (H)  Comment: long standing depression and anxiety, now compounded by dementia and loss of coping skills  Plan: continue duloxetine, mirtazapine, seroquel    (I25.10) Coronary artery disease involving native heart without angina pectoris, unspecified vessel or lesion type  Comment: asymptomatic   Extensive abdominal aortic calcification on DEXA 2020.   Plan: continue ASA, statin     (M15.9) Osteoarthritis of multiple joints, unspecified osteoarthritis type  Comment: no significant pain   Plan: continue tylenol prn     (M81.0) Age-related osteoporosis without current pathological fracture  Comment: remains ambulatory, no falls   Plan: continue vitamin D, dietary calcium         Total time spent in patient visit at the AL facility was 62 minutes with >50% of total time spent in counseling and coordinating care, including patient visit, review of past records and 33 min phone care with her sister. Counseling sister re: nature of dementia, medications and potential side effects, management of depression, plan of care. Coordinating plan of care with facility staff.         Electronically signed by: ENID Taylor CNP

## 2023-10-04 ENCOUNTER — ASSISTED LIVING VISIT (OUTPATIENT)
Dept: GERIATRICS | Facility: CLINIC | Age: 83
End: 2023-10-04
Payer: COMMERCIAL

## 2023-10-04 VITALS
HEIGHT: 60 IN | SYSTOLIC BLOOD PRESSURE: 122 MMHG | TEMPERATURE: 97 F | WEIGHT: 130.2 LBS | BODY MASS INDEX: 25.56 KG/M2 | HEART RATE: 95 BPM | DIASTOLIC BLOOD PRESSURE: 78 MMHG | RESPIRATION RATE: 18 BRPM

## 2023-10-04 DIAGNOSIS — F33.41 RECURRENT MAJOR DEPRESSIVE DISORDER, IN PARTIAL REMISSION (H): ICD-10-CM

## 2023-10-04 DIAGNOSIS — F41.9 ANXIETY: ICD-10-CM

## 2023-10-04 DIAGNOSIS — R44.3 HALLUCINATIONS: ICD-10-CM

## 2023-10-04 DIAGNOSIS — G30.1 MODERATE LATE ONSET ALZHEIMER'S DEMENTIA WITH PSYCHOTIC DISTURBANCE (H): Primary | ICD-10-CM

## 2023-10-04 DIAGNOSIS — F02.B2 MODERATE LATE ONSET ALZHEIMER'S DEMENTIA WITH PSYCHOTIC DISTURBANCE (H): Primary | ICD-10-CM

## 2023-10-04 DIAGNOSIS — F22 DELUSIONS (H): ICD-10-CM

## 2023-10-04 PROCEDURE — 99349 HOME/RES VST EST MOD MDM 40: CPT | Performed by: NURSE PRACTITIONER

## 2023-10-04 RX ORDER — BUSPIRONE HYDROCHLORIDE 5 MG/1
5 TABLET ORAL 2 TIMES DAILY
Qty: 60 TABLET | Refills: 11 | Status: SHIPPED | OUTPATIENT
Start: 2023-10-04 | End: 2024-01-31

## 2023-10-04 NOTE — PROGRESS NOTES
"[facility] GERIATRICS    Chief Complaint   Patient presents with    RECHECK     HPI:  Elzbieta Ivan is a 82 year old  (1940), who is being seen today for an episodic care visit at: [facility] ([facility]) [269].   She has lived at this facility since 6/2022, initially in AL then moved to Memory Care 12/2022. We assumed primary care 5/2023.   Medical history significant for dementia, major depression, anxiety, suicidal ideation, anemia, GERD, CAD, hyperlipidemia, Hashimoto's,  essential tremor, fibromyalgia, osteoarthritis, osteoporosis.   She was hospitalized on the [facility] Unit 11/28-12/6/2022 with suicidal ideation and a plan. Cymbalta was decreased from 120 mg daily to 90 mg due to potential side effects of mood instability and irritability. Xanax was dc'd. Mirtazapine and prn seroquel were started. She returned to the facility and moved to Memory Care for a higher level of care.   She was seen by Dr Giang at [facility] 3/7/2023 and rivastigmine was started. Note indicates that Neuropsych testing showed memory markedly affected,  executive abilities moderately affected, verbal processing mild to moderately affected, nonverbal processing minimally affected. Findings consistent with Alzheimer's disease.     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 Pleasant and conversant, but became increasingly anxious as the visit progressed.  Repeatedly states that she doesn't belong here and feels trapped. Requests Xanax,    \"I took it for years.\" Reports sleeping well but has \"nightmares and complicated dreams.\" She attributes this to staff turning on the lights during safety checks. Worries about her sister, who has pancreatic cancer, \"there's nothing I can do to help her.\"   Spoke with her " sister/POA Bacilio Ivan, who is very involved. She feels the delusional thinking, hallucinations and agitation are much improved with seroquel, but anxiety remains severe at times.     Allergies, and PMH/PSH reviewed in EPIC today    REVIEW OF SYSTEMS:  Limited due to dementia. Positives as noted under HPI.     Objective:   /78   Pulse 95   Temp 97  F (36.1  C)   Resp 18   Ht 1.524 m (5')   Wt 59.1 kg (130 lb 3.2 oz)   BMI 25.43 kg/m    GENERAL APPEARANCE:  Alert, in no distress  ENT:  [place], oropharynx clear  EYES:  conjunctiva and lids normal  NECK:  no adenopathy, no thyromegaly  RESP:  lungs clear to auscultation  CV:  regular rate and rhythm, no murmur,  no edema  ABDOMEN:  soft, non-tender, no distension   M/S:   gait steady. EVANGELISTA with good strength. No joint inflammation   SKIN:  no rashes or open areas  PSYCH:  oriented to self, place, situation, insight and judgement impaired, memory impaired, anxious     Recent labs in [facility] reviewed by me today.     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 She has minimal insight re: her deficits.   Plan: continue seroquel, mirtazapine, duloxetine Memory Care staff assistance with cares, meals, activities, med admin  Sister agrees with plan of care  Discussed with staff     (F33.41) Recurrent major depressive disorder, in partial remission (H24)  (F41.9) Anxiety  Comment: long standing and predates dementia. Compounded by loss of coping skills associated with cognitive decline. Mood has improved, but remains anxious. Question if mirtazapine is contributing to nightmares, but will make 1 med change at a time.   Plan: start buspar 5 mg bid. Continue duloxetine, mirtazapine, seroquel.                  Electronically signed by: Cornell  ENID Dupree CNP

## 2023-10-04 NOTE — LETTER
10/4/2023        RE: Elzbieta Ivan  [address]  [place] [address]        [facility] GERIATRICS    Chief Complaint   Patient presents with    RECHECK     HPI:  Elzbieta Ivan is a 82 year old  (1940), who is being seen today for an episodic care visit at: [facility] ([facility]) [269]. Today's concern is: ***    Allergies, and PMH/PSH reviewed in [facility] today.  REVIEW OF SYSTEMS:  {uvtzrk98:041336}    Objective:   /78   Pulse 95   Temp 97  F (36.1  C)   Resp 18   Ht 1.524 m (5')   Wt 59.1 kg (130 lb 3.2 oz)   BMI 25.43 kg/m    {Nursing home physical exam :950121}    {fgslab:535029}    Assessment/Plan:  {FGS DX2:352367}    MED REC REQUIRED{TIP  Click the link below to document or use med rec list, use list to pull in response :310674}  Post Medication Reconciliation Status: {MED REC LIST:721284}      Orders:  {fgsorders:523046}  ***    Electronically signed by: Jonah Hernández ***           Sincerely,        ENID Taylor CNP

## 2023-10-04 NOTE — PROGRESS NOTES
Elzbieta Ivan   1940    Orders 10/4/2023:  Buspar 5 mg po bid for anxiety  Sent to pharmacy  Discussed with her sister      Cornell Calvo ENID Junior CNP on 10/4/2023 at 4:21 PM

## 2023-11-04 PROBLEM — Z97.2 WEARS DENTURES: Status: ACTIVE | Noted: 2023-11-04

## 2023-11-04 PROBLEM — F33.1 MAJOR DEPRESSIVE DISORDER, RECURRENT, MODERATE (H): Status: ACTIVE | Noted: 2020-03-06

## 2023-11-04 PROBLEM — R93.89 ABNORMAL CAROTID ULTRASOUND: Status: ACTIVE | Noted: 2020-05-04

## 2023-11-04 PROBLEM — M17.12 PRIMARY OSTEOARTHRITIS OF LEFT KNEE: Status: ACTIVE | Noted: 2020-03-31

## 2023-11-04 PROBLEM — M19.042 PRIMARY OSTEOARTHRITIS OF BOTH HANDS: Status: ACTIVE | Noted: 2021-04-23

## 2023-11-04 PROBLEM — K63.5 HYPERPLASTIC COLON POLYP: Status: ACTIVE | Noted: 2021-11-20

## 2023-11-04 PROBLEM — M19.041 PRIMARY OSTEOARTHRITIS OF BOTH HANDS: Status: ACTIVE | Noted: 2021-04-23

## 2023-12-13 ENCOUNTER — ASSISTED LIVING VISIT (OUTPATIENT)
Dept: GERIATRICS | Facility: CLINIC | Age: 83
End: 2023-12-13
Payer: COMMERCIAL

## 2023-12-13 VITALS
TEMPERATURE: 98.2 F | BODY MASS INDEX: 24.65 KG/M2 | RESPIRATION RATE: 16 BRPM | DIASTOLIC BLOOD PRESSURE: 79 MMHG | WEIGHT: 126.2 LBS | SYSTOLIC BLOOD PRESSURE: 142 MMHG | HEART RATE: 82 BPM

## 2023-12-13 DIAGNOSIS — F41.9 ANXIETY: Primary | ICD-10-CM

## 2023-12-13 DIAGNOSIS — G30.1 MODERATE LATE ONSET ALZHEIMER'S DEMENTIA WITH PSYCHOTIC DISTURBANCE (H): ICD-10-CM

## 2023-12-13 DIAGNOSIS — R44.3 HALLUCINATIONS: ICD-10-CM

## 2023-12-13 DIAGNOSIS — F22 DELUSIONS (H): ICD-10-CM

## 2023-12-13 DIAGNOSIS — F33.41 RECURRENT MAJOR DEPRESSIVE DISORDER, IN PARTIAL REMISSION (H): ICD-10-CM

## 2023-12-13 DIAGNOSIS — F02.B2 MODERATE LATE ONSET ALZHEIMER'S DEMENTIA WITH PSYCHOTIC DISTURBANCE (H): ICD-10-CM

## 2023-12-13 PROCEDURE — 99349 HOME/RES VST EST MOD MDM 40: CPT | Performed by: NURSE PRACTITIONER

## 2023-12-13 RX ORDER — BUSPIRONE HYDROCHLORIDE 5 MG/1
7.5 TABLET ORAL 2 TIMES DAILY
Qty: 45 TABLET | Refills: 11 | Status: SHIPPED | OUTPATIENT
Start: 2023-12-13 | End: 2024-01-30

## 2023-12-13 NOTE — PROGRESS NOTES
[facility] GERIATRICS    Chief Complaint   Patient presents with    RECHECK     HPI:  Elzbieta Ivan is a 83 year old  (1940), who is being seen today for an episodic care visit at: [facility] ([facility]) [269].   She has lived at this facility since 6/2022, initially in AL then moved to Memory Care 12/2022. We assumed primary care 5/2023.   Medical history significant for dementia, major depression, anxiety, suicidal ideation, anemia, GERD, CAD, hyperlipidemia, Hashimoto's,  essential tremor, fibromyalgia, osteoarthritis, osteoporosis.   She was hospitalized on the [facility] Unit 11/28-12/6/2022 with suicidal ideation and a plan. Cymbalta was decreased from 120 mg daily to 90 mg due to potential side effects of mood instability and irritability. Xanax was dc'd. Mirtazapine and prn seroquel were started. She returned to the facility and moved to Memory Care for a higher level of care.   She was seen by Dr Giang at [facility] 3/7/2023 and rivastigmine was started. Note indicates that Neuropsych testing showed memory markedly affected,  executive abilities moderately affected, verbal processing mild to moderately affected, nonverbal processing minimally affected. Findings consistent with Alzheimer's disease.        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 Her sister has been ill and recently enrolled with hospice, which has been very stressful. She is waiting for a friend to arrive to take her to visit her sister. Pleasant, but anxious and pacing about her kitchen. Worried about her sister and states this isn't a good time to meet. Up ad katharina and independent with cares.     Allergies, and PMH/PSH reviewed in EPIC today    REVIEW OF SYSTEMS:  Limited due to  dementia. Positives as noted above     Objective:   BP (!) 142/79   Pulse 82   Temp 98.2  F (36.8  C)   Resp 16   Wt 57.2 kg (126 lb 3.2 oz)   BMI 24.65 kg/m    GENERAL APPEARANCE:  Alert, in no distress   ENT:  [place]  EYES:  conjunctiva and lids normal  RESP:  no respiratory distress  M/S:   gait steady. EVANGELISTA with good strength  PSYCH:  oriented to self, place, situation , insight and judgement impaired, memory impaired , anxious      ASSESSMENT / PLAN:  (F41.9) Anxiety  (primary encounter diagnosis)  (F33.41) Recurrent major depressive disorder, in partial remission (H24)  Comment: long standing, exacerbated by cognitive decline with loss of coping skills. She is understandably having a difficult time coping with her sister's illness.   Plan: increase buspar to 7.5 mg bid. Continue seroquel, mirtazapine, duloxetine.     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 Hallucinations are better managed  Plan: continues meds as above. Memory Care staff assistance with cares, meals, activities, med admin        Electronically signed by: ENID Taylor CNP

## 2023-12-13 NOTE — LETTER
12/13/2023        RE: Elzbieta Ivan  [address]  [place] [address]        [facility] GERIATRICS    Chief Complaint   Patient presents with     RECHECK     HPI:  Elzbieta Ivan is a 83 year old  (1940), who is being seen today for an episodic care visit at: [facility]) [269].   She has lived at this facility since 6/2022, initially in AL then moved to Memory Care 12/2022. We assumed primary care 5/2023.   Medical history significant for dementia, major depression, anxiety, suicidal ideation, anemia, GERD, CAD, hyperlipidemia, Hashimoto's,  essential tremor, fibromyalgia, osteoarthritis, osteoporosis.   She was hospitalized on the [facility] Unit 11/28-12/6/2022 with suicidal ideation and a plan. Cymbalta was decreased from 120 mg daily to 90 mg due to potential side effects of mood instability and irritability. Xanax was dc'd. Mirtazapine and prn seroquel were started. She returned to the facility and moved to Memory Care for a higher level of care.   She was seen by Dr Giang at [facility] 3/7/2023 and rivastigmine was started. Note indicates that Neuropsych testing showed memory markedly affected,  executive abilities moderately affected, verbal processing mild to moderately affected, nonverbal processing minimally affected. Findings consistent with Alzheimer's disease.        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 Her sister has been ill and recently enrolled with hospice, which has been very stressful. She is waiting for a friend to arrive to take her to visit her sister. Pleasant, but anxious and pacing about her kitchen. Worried about her sister and states this isn't a good time to meet. Up ad katharina and independent with  cares.     Allergies, and PMH/PSH reviewed in EPIC today    REVIEW OF SYSTEMS:  Limited due to dementia. Positives as noted above     Objective:   BP (!) 142/79   Pulse 82   Temp 98.2  F (36.8  C)   Resp 16   Wt 57.2 kg (126 lb 3.2 oz)   BMI 24.65 kg/m    GENERAL APPEARANCE:  Alert, in no distress   ENT:  [place]  EYES:  conjunctiva and lids normal  RESP:  no respiratory distress  M/S:   gait steady. EVANGELISTA with good strength  PSYCH:  oriented to self, place, situation , insight and judgement impaired, memory impaired , anxious      ASSESSMENT / PLAN:  (F41.9) Anxiety  (primary encounter diagnosis)  (F33.41) Recurrent major depressive disorder, in partial remission (H24)  Comment: long standing, exacerbated by cognitive decline with loss of coping skills. She is understandably having a difficult time coping with her sister's illness.   Plan: increase buspar to 7.5 mg bid. Continue seroquel, mirtazapine, duloxetine.     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 Hallucinations are better managed  Plan: continues meds as above. Memory Care staff assistance with cares, meals, activities, med admin        Electronically signed by: ENID Taylor CNP           Sincerely,        ENID Taylor CNP

## 2023-12-27 ENCOUNTER — DOCUMENTATION ONLY (OUTPATIENT)
Dept: OTHER | Facility: CLINIC | Age: 83
End: 2023-12-27
Payer: COMMERCIAL

## 2023-12-29 DIAGNOSIS — E78.00 PURE HYPERCHOLESTEROLEMIA: Primary | ICD-10-CM

## 2024-01-04 RX ORDER — DIPHENHYDRAMINE HYDROCHLORIDE 25 MG/1
1 TABLET ORAL DAILY
Qty: 31 CAPSULE | Refills: 11 | Status: SHIPPED | OUTPATIENT
Start: 2024-01-04

## 2024-01-17 ENCOUNTER — ASSISTED LIVING VISIT (OUTPATIENT)
Dept: GERIATRICS | Facility: CLINIC | Age: 84
End: 2024-01-17
Payer: COMMERCIAL

## 2024-01-17 VITALS
SYSTOLIC BLOOD PRESSURE: 127 MMHG | DIASTOLIC BLOOD PRESSURE: 77 MMHG | WEIGHT: 123 LBS | TEMPERATURE: 97.2 F | HEART RATE: 79 BPM | BODY MASS INDEX: 24.02 KG/M2 | RESPIRATION RATE: 18 BRPM

## 2024-01-17 DIAGNOSIS — R44.3 HALLUCINATIONS: ICD-10-CM

## 2024-01-17 DIAGNOSIS — F41.9 ANXIETY: Primary | ICD-10-CM

## 2024-01-17 DIAGNOSIS — F33.41 RECURRENT MAJOR DEPRESSIVE DISORDER, IN PARTIAL REMISSION (H): ICD-10-CM

## 2024-01-17 DIAGNOSIS — G30.1 MODERATE LATE ONSET ALZHEIMER'S DEMENTIA WITH PSYCHOTIC DISTURBANCE (H): ICD-10-CM

## 2024-01-17 DIAGNOSIS — F02.B2 MODERATE LATE ONSET ALZHEIMER'S DEMENTIA WITH PSYCHOTIC DISTURBANCE (H): ICD-10-CM

## 2024-01-17 DIAGNOSIS — F43.21 GRIEF: ICD-10-CM

## 2024-01-17 DIAGNOSIS — F22 DELUSIONS (H): ICD-10-CM

## 2024-01-17 PROCEDURE — 99349 HOME/RES VST EST MOD MDM 40: CPT | Performed by: NURSE PRACTITIONER

## 2024-01-17 RX ORDER — ALPRAZOLAM 0.25 MG
0.25 TABLET ORAL DAILY
Qty: 10 TABLET | Refills: 0 | Status: SHIPPED | OUTPATIENT
Start: 2024-01-17 | End: 2024-01-27

## 2024-01-17 NOTE — LETTER
1/17/2024        RE: Elzbieta Ivan  [address]  [place] [address]        No notes on file      Sincerely,        ENID Taylor CNP

## 2024-01-17 NOTE — PROGRESS NOTES
"[facility] GERIATRICS    Chief Complaint   Patient presents with    RECHECK     HPI:  Elzbieta Ivan is a 83 year old  (1940), who is being seen today for an episodic care visit at: [facility] ([facility]) [269].   She has lived at this facility since 6/2022, initially in AL then moved to Memory Care 12/2022. We assumed primary care 5/2023.   Medical history significant for dementia, major depression, anxiety, suicidal ideation, anemia, GERD, CAD, hyperlipidemia, Hashimoto's,  essential tremor, fibromyalgia, osteoarthritis, osteoporosis.   She was hospitalized on the [facility] Unit 11/28-12/6/2022 with suicidal ideation and a plan. Cymbalta was decreased from 120 mg daily to 90 mg due to potential side effects of mood instability and irritability. Xanax was dc'd. Mirtazapine and prn seroquel were started. She returned to the facility and moved to Memory Care for a higher level of care.   She was seen by Dr Giang at [facility] 3/7/2023 and rivastigmine was started. Note indicates that Neuropsych testing showed memory markedly affected,  executive abilities moderately affected, verbal processing mild to moderately affected, nonverbal processing minimally affected. Findings were consistent with Alzheimer's disease.     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 She is distraught and crying. Her sister is on hospice care and it sounds like the end is near. She was able to calm down long enough to state that she plans to move out of Memory Care after her sister dies. \"I can't stay here for the rest of my life.\" She requests Xanax, stating that she took it for years and it's  the only med that helps her. Up ad katharina, independent with cares. Staff reports daily episodes of " crying and agitation.     Allergies, and PMH/PSH reviewed in EPIC today    REVIEW OF SYSTEMS:  Limited due to dementia. Positives as noted under HPI    Objective:   /77   Pulse 79   Temp 97.2  F (36.2  C)   Resp 18   Wt 55.8 kg (123 lb)   BMI 24.02 kg/m    GENERAL APPEARANCE:  Alert, crying, anxious   ENT:  [place]  EYES:  conjunctiva and lids normal  RESP:  no respiratory distress  M/S:   gait steady. EVANGELISTA with good strength  PSYCH:  oriented to self, place, situation , insight and judgement impaired, memory impaired     Recent labs in [facility] reviewed by me today.     ASSESSMENT / PLAN:  (F41.9) Anxiety  (primary encounter diagnosis)  (F43.21) Grief   (F33.41) Recurrent major depressive disorder, in partial remission (H24)  Comment: having extreme difficulty coping with her sister's impending death   Plan: Xanax 0.25 mg daily prn for 10 days, then discontinue. Continue buspar, seroquel, mirtazapine, duloxetine. Discussed with staff-perhaps the  can meet with her.     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   Plan: meds as above. Memory Care staff assistance with cares, meals, activities, med admin        Electronically signed by: ENID Taylor CNP

## 2024-01-30 DIAGNOSIS — G30.1 MODERATE LATE ONSET ALZHEIMER'S DEMENTIA WITH PSYCHOTIC DISTURBANCE (H): ICD-10-CM

## 2024-01-30 DIAGNOSIS — R44.3 HALLUCINATIONS: ICD-10-CM

## 2024-01-30 DIAGNOSIS — F41.9 ANXIETY: ICD-10-CM

## 2024-01-30 DIAGNOSIS — F02.B2 MODERATE LATE ONSET ALZHEIMER'S DEMENTIA WITH PSYCHOTIC DISTURBANCE (H): ICD-10-CM

## 2024-01-30 RX ORDER — MIRTAZAPINE 7.5 MG/1
7.5 TABLET, FILM COATED ORAL AT BEDTIME
Qty: 90 TABLET | Refills: 97 | Status: SHIPPED | OUTPATIENT
Start: 2024-01-30 | End: 2024-05-15 | Stop reason: DRUGHIGH

## 2024-01-30 RX ORDER — BUSPIRONE HYDROCHLORIDE 7.5 MG/1
TABLET ORAL
Qty: 180 TABLET | Refills: 97 | Status: SHIPPED | OUTPATIENT
Start: 2024-01-30

## 2024-02-09 ENCOUNTER — DOCUMENTATION ONLY (OUTPATIENT)
Dept: GERIATRICS | Facility: CLINIC | Age: 84
End: 2024-02-09
Payer: COMMERCIAL

## 2024-02-21 ENCOUNTER — ASSISTED LIVING VISIT (OUTPATIENT)
Dept: GERIATRICS | Facility: CLINIC | Age: 84
End: 2024-02-21
Payer: COMMERCIAL

## 2024-02-21 VITALS
RESPIRATION RATE: 16 BRPM | TEMPERATURE: 98.1 F | DIASTOLIC BLOOD PRESSURE: 69 MMHG | WEIGHT: 126.8 LBS | HEART RATE: 78 BPM | BODY MASS INDEX: 24.76 KG/M2 | SYSTOLIC BLOOD PRESSURE: 138 MMHG

## 2024-02-21 DIAGNOSIS — F41.9 ANXIETY: ICD-10-CM

## 2024-02-21 DIAGNOSIS — F02.B2 MODERATE LATE ONSET ALZHEIMER'S DEMENTIA WITH PSYCHOTIC DISTURBANCE (H): ICD-10-CM

## 2024-02-21 DIAGNOSIS — R44.3 HALLUCINATIONS: ICD-10-CM

## 2024-02-21 DIAGNOSIS — F33.41 RECURRENT MAJOR DEPRESSIVE DISORDER, IN PARTIAL REMISSION (H): ICD-10-CM

## 2024-02-21 DIAGNOSIS — F43.21 GRIEF: Primary | ICD-10-CM

## 2024-02-21 DIAGNOSIS — F22 DELUSIONS (H): ICD-10-CM

## 2024-02-21 DIAGNOSIS — G30.1 MODERATE LATE ONSET ALZHEIMER'S DEMENTIA WITH PSYCHOTIC DISTURBANCE (H): ICD-10-CM

## 2024-02-21 PROCEDURE — 99349 HOME/RES VST EST MOD MDM 40: CPT | Performed by: NURSE PRACTITIONER

## 2024-02-21 NOTE — PROGRESS NOTES
"[facility] GERIATRICS    Chief Complaint   Patient presents with    RECHECK     HPI:  Elzbieta Ivan is a 83 year old  (1940), who is being seen today for an episodic care visit at: [facility] ([facility]) [269].   She has lived at this facility since 6/2022, initially in AL then moved to Memory Care 12/2022. We assumed primary care 5/2023.   Medical history significant for dementia, major depression, anxiety, suicidal ideation, anemia, GERD, CAD, hyperlipidemia, Hashimoto's,  essential tremor, fibromyalgia, osteoarthritis, osteoporosis.   She was hospitalized on the [facility] Unit 11/28-12/6/2022 with suicidal ideation and a plan. Cymbalta was decreased from 120 mg daily to 90 mg due to potential side effects of mood instability and irritability. Xanax was dc'd. Mirtazapine and prn seroquel were started. She returned to the facility and moved to Memory Care for a higher level of care.   She was seen by Dr Giang at [facility] 3/7/2023 and rivastigmine was started. Note indicates that Neuropsych testing showed memory markedly affected,  executive abilities moderately affected, verbal processing mild to moderately affected, nonverbal processing minimally affected. Findings were consistent with Alzheimer's disease.     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 Daily Xanax X 10 days was ordered 1/17/2024 to help with extreme anxiety, crying and agitation. She is calm today, getting ready to go out with a friend. She talks little about her sister and most of her conversation is focused on the friend (Wisam) who is taking her out today. Denies feeling ill. \"I'm doing the best I can.\" Sleeping well.Up ad katharina and independent with cares. " Staff reports no new concerns.     Allergies, and PMH/PSH reviewed in EPIC today    REVIEW OF SYSTEMS:  4 point ROS including Respiratory, CV, GI and , other than that noted in the HPI,  is negative    Objective:   /69   Pulse 78   Temp 98.1  F (36.7  C)   Resp 16   Wt 57.5 kg (126 lb 12.8 oz)   BMI 24.76 kg/m    GENERAL APPEARANCE:  Alert, in no distress  ENT:  [place]  EYES:  conjunctiva and lids normal  RESP:  lungs clear to auscultation   CV:  regular rate and rhythm, no murmur, no edema  ABDOMEN:  soft, non-tender, no distension  M/S:   gait steady. EVANGELISTA with good strength. No joint inflammation   SKIN:  no rashes or open areas  PSYCH:  oriented to self, place, situation, insight and judgement impaired, memory impaired , affect and mood normal    Recent labs in [facility] reviewed by me today.       ASSESSMENT / PLAN:  (F43.21) Grief  (primary encounter diagnosis)  (F41.9) Anxiety  (F33.41) Recurrent major depressive disorder, in partial remission (H24)  Comment: she is unusually calm and in good spirits today  Plan: continue buspar, seroquel, mirtazapine, duloxetine.     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 Hallucinations and delusions much improved with current meds   Plan: continue meds as above. Memory Care staff assistance with cares, meals, activities, med admin        Electronically signed by: ENID Taylor CNP

## 2024-02-21 NOTE — LETTER
2/21/2024        RE: Elzbieta Ivan  [address]  [place] [address]        [facility] GERIATRICS    Chief Complaint   Patient presents with     RECHECK     HPI:  Elzbieta Ivan is a 83 year old  (1940), who is being seen today for an episodic care visit at: [facility]) [269].   She has lived at this facility since 6/2022, initially in AL then moved to Memory Care 12/2022. We assumed primary care 5/2023.   Medical history significant for dementia, major depression, anxiety, suicidal ideation, anemia, GERD, CAD, hyperlipidemia, Hashimoto's,  essential tremor, fibromyalgia, osteoarthritis, osteoporosis.   She was hospitalized on the [facility] Unit 11/28-12/6/2022 with suicidal ideation and a plan. Cymbalta was decreased from 120 mg daily to 90 mg due to potential side effects of mood instability and irritability. Xanax was dc'd. Mirtazapine and prn seroquel were started. She returned to the facility and moved to Memory Care for a higher level of care.   She was seen by Dr Giang at [facility] 3/7/2023 and rivastigmine was started. Note indicates that Neuropsych testing showed memory markedly affected,  executive abilities moderately affected, verbal processing mild to moderately affected, nonverbal processing minimally affected. Findings were consistent with Alzheimer's disease.     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 Daily Xanax X 10 days was ordered 1/17/2024 to help with extreme anxiety, crying and agitation. She is calm today, getting ready to go out with a friend. She talks little about her sister and most of her conversation is focused on the friend (Wisam) who is taking her out today.  "Denies feeling ill. \"I'm doing the best I can.\" Sleeping well.Up ad katharina and independent with cares. Staff reports no new concerns.     Allergies, and PMH/PSH reviewed in EPIC today    REVIEW OF SYSTEMS:  4 point ROS including Respiratory, CV, GI and , other than that noted in the HPI,  is negative    Objective:   /69   Pulse 78   Temp 98.1  F (36.7  C)   Resp 16   Wt 57.5 kg (126 lb 12.8 oz)   BMI 24.76 kg/m    GENERAL APPEARANCE:  Alert, in no distress  ENT:  [place]  EYES:  conjunctiva and lids normal  RESP:  lungs clear to auscultation   CV:  regular rate and rhythm, no murmur, no edema  ABDOMEN:  soft, non-tender, no distension  M/S:   gait steady. EVANGELISTA with good strength. No joint inflammation   SKIN:  no rashes or open areas  PSYCH:  oriented to self, place, situation, insight and judgement impaired, memory impaired , affect and mood normal    Recent labs in [facility] reviewed by me today.       ASSESSMENT / PLAN:  (F43.21) Grief  (primary encounter diagnosis)  (F41.9) Anxiety  (F33.41) Recurrent major depressive disorder, in partial remission (H24)  Comment: she is unusually calm and in good spirits today  Plan: continue buspar, seroquel, mirtazapine, duloxetine.     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 Hallucinations and delusions much improved with current meds   Plan: continue meds as above. Memory Care staff assistance with cares, meals, activities, med admin        Electronically signed by: ENID Taylor CNP           Sincerely,        ENID Taylor CNP      "

## 2024-02-26 ENCOUNTER — TELEPHONE (OUTPATIENT)
Dept: GERIATRICS | Facility: CLINIC | Age: 84
End: 2024-02-26
Payer: COMMERCIAL

## 2024-02-26 DIAGNOSIS — R21 RASH: Primary | ICD-10-CM

## 2024-02-26 DIAGNOSIS — G30.1 MODERATE LATE ONSET ALZHEIMER'S DEMENTIA WITH PSYCHOTIC DISTURBANCE (H): Primary | ICD-10-CM

## 2024-02-26 DIAGNOSIS — F02.B2 MODERATE LATE ONSET ALZHEIMER'S DEMENTIA WITH PSYCHOTIC DISTURBANCE (H): Primary | ICD-10-CM

## 2024-02-26 RX ORDER — BENZOCAINE/MENTHOL 6 MG-10 MG
LOZENGE MUCOUS MEMBRANE 2 TIMES DAILY
Qty: 120 G | Refills: 1 | Status: SHIPPED | OUTPATIENT
Start: 2024-02-26 | End: 2024-08-21

## 2024-02-26 NOTE — TELEPHONE ENCOUNTER
Elzbieta Ivan   1940    Orders 2024:  HC cream 1%, apply thin layer to both LE bid for rash. Please update me in 3-4 days.   Sent to pharmacy      ENID Taylor CNP on 2024 at 2:58 PM

## 2024-02-27 RX ORDER — RIVASTIGMINE TARTRATE 3 MG/1
3 CAPSULE ORAL 2 TIMES DAILY
Qty: 180 CAPSULE | Refills: 3 | Status: SHIPPED | OUTPATIENT
Start: 2024-02-27

## 2024-03-27 DIAGNOSIS — E78.5 HYPERLIPIDEMIA: Primary | Chronic | ICD-10-CM

## 2024-03-27 RX ORDER — SIMVASTATIN 40 MG
40 TABLET ORAL DAILY
Qty: 90 TABLET | Refills: 3 | Status: SHIPPED | OUTPATIENT
Start: 2024-03-27

## 2024-03-28 ENCOUNTER — TELEPHONE (OUTPATIENT)
Dept: GERIATRICS | Facility: CLINIC | Age: 84
End: 2024-03-28
Payer: COMMERCIAL

## 2024-03-28 DIAGNOSIS — M54.50 ACUTE LEFT-SIDED LOW BACK PAIN, UNSPECIFIED WHETHER SCIATICA PRESENT: Primary | ICD-10-CM

## 2024-03-28 DIAGNOSIS — R52 PAIN: ICD-10-CM

## 2024-03-28 RX ORDER — LIDOCAINE 4 G/G
1 PATCH TOPICAL EVERY 24 HOURS
Qty: 30 PATCH | Refills: 11 | Status: SHIPPED | OUTPATIENT
Start: 2024-03-28 | End: 2024-04-06

## 2024-03-28 RX ORDER — ACETAMINOPHEN 325 MG/1
TABLET ORAL
Qty: 120 TABLET | Refills: 11 | Status: SHIPPED | OUTPATIENT
Start: 2024-03-28

## 2024-03-28 NOTE — TELEPHONE ENCOUNTER
Elzbieta Ivan   1940    Orders 3/28/2024:  Lidocaine patch 4%, apply 1 patch to low back for 12 hrs daily. On in am, off at HS for back pain  Tylenol 650 mg po tid scheduled plus 650 mg po bid prn pain  DISCONTINUE previous prn tylenol order  Call if symptoms worsen  Sent to pharmacy    ENID Taylor CNP on 3/28/2024 at 2:47 PM

## 2024-03-28 NOTE — TELEPHONE ENCOUNTER
AL nurse reports patient has had low back pain and LLE pain for the past 2-3 days. No fall or injury. Up ad katharina per usual. No numbness, tingling or weakness of her legs per nurse report.   Orders given for scheduled tylenol and lidocaine patch.     ENID Taylor CNP on 3/28/2024 at 2:43 PM

## 2024-04-05 ENCOUNTER — ASSISTED LIVING VISIT (OUTPATIENT)
Dept: GERIATRICS | Facility: CLINIC | Age: 84
End: 2024-04-05
Payer: COMMERCIAL

## 2024-04-05 VITALS
HEART RATE: 80 BPM | BODY MASS INDEX: 23.51 KG/M2 | TEMPERATURE: 97.5 F | SYSTOLIC BLOOD PRESSURE: 110 MMHG | DIASTOLIC BLOOD PRESSURE: 78 MMHG | RESPIRATION RATE: 16 BRPM | WEIGHT: 120.4 LBS

## 2024-04-05 DIAGNOSIS — G30.1 MODERATE LATE ONSET ALZHEIMER'S DEMENTIA WITH PSYCHOTIC DISTURBANCE (H): Primary | ICD-10-CM

## 2024-04-05 DIAGNOSIS — F22 DELUSIONS (H): ICD-10-CM

## 2024-04-05 DIAGNOSIS — I25.10 CORONARY ARTERY DISEASE INVOLVING NATIVE HEART WITHOUT ANGINA PECTORIS, UNSPECIFIED VESSEL OR LESION TYPE: ICD-10-CM

## 2024-04-05 DIAGNOSIS — F33.41 RECURRENT MAJOR DEPRESSIVE DISORDER, IN PARTIAL REMISSION (H): ICD-10-CM

## 2024-04-05 DIAGNOSIS — R44.3 HALLUCINATIONS: ICD-10-CM

## 2024-04-05 DIAGNOSIS — F41.9 ANXIETY: ICD-10-CM

## 2024-04-05 DIAGNOSIS — M54.50 ACUTE LEFT-SIDED LOW BACK PAIN, UNSPECIFIED WHETHER SCIATICA PRESENT: ICD-10-CM

## 2024-04-05 DIAGNOSIS — I70.0 AORTIC CALCIFICATION (H): ICD-10-CM

## 2024-04-05 DIAGNOSIS — F02.B2 MODERATE LATE ONSET ALZHEIMER'S DEMENTIA WITH PSYCHOTIC DISTURBANCE (H): Primary | ICD-10-CM

## 2024-04-05 PROCEDURE — 99349 HOME/RES VST EST MOD MDM 40: CPT | Performed by: NURSE PRACTITIONER

## 2024-04-05 NOTE — PROGRESS NOTES
[facility] GERIATRICS    Chief Complaint   Patient presents with    RECHECK     HPI:  Elzbieta Ivan is a 83 year old  (1940), who is being seen today for an episodic care visit at: [facility] ([facility]) [269].   She has lived at this facility since 6/2022, initially in AL then moved to Memory Care 12/2022. We assumed primary care 5/2023.   Medical history significant for dementia, major depression, anxiety, suicidal ideation, anemia, GERD, CAD, hyperlipidemia, Hashimoto's,  essential tremor, fibromyalgia, osteoarthritis, osteoporosis.   She was hospitalized on the [facility] 11/28-12/6/2022 with suicidal ideation and a plan. Cymbalta was decreased from 120 mg daily to 90 mg due to potential side effects of mood instability and irritability. Xanax was dc'd. Mirtazapine and prn seroquel were started. She returned to the facility and moved to Memory Care for a higher level of care.   She was seen by Dr Giang at [facility] 3/7/2023 and rivastigmine was started. Note indicates that Neuropsych testing showed memory markedly affected,  executive abilities moderately affected, verbal processing mild to moderately affected, nonverbal processing minimally affected. Findings were consistent with Alzheimer's disease.     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 She is a poor historian. Conversation is more disorganized than usual.  Reports back pain has resolved. Denies fall or injury. States that she read an article about a physician at [place] who has a new  "treatment for \"people with my brain issues\" but she's not sure if she wants to go to [place]. As usual, she becomes more anxious during the visit, talking about wanting to move and live independently. She feels that other residents on the unit were \"dumped here by their families.\"  Up ad katharina without a device and independent with cares.       Allergies, and PMH/PSH reviewed in EPIC today    REVIEW OF SYSTEMS:  Limited due to dementia. Positives as noted under HPI    Objective:   /78   Pulse 80   Temp 97.5  F (36.4  C)   Resp 16   Wt 54.6 kg (120 lb 6.4 oz)   BMI 23.51 kg/m    Exam unchanged   GENERAL APPEARANCE:  Alert, in no distress  ENT:  [place]  EYES:  conjunctiva and lids normal  RESP:  lungs clear to auscultation   CV:  regular rate and rhythm, no murmur, no edema  ABDOMEN:  soft, non-tender, no distension  M/S:   gait steady. EVANGELISTA with good strength. 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 She lacks insight re: her deficits. Hallucinations improved with seroquel   Plan: continue seroquel, buspar, mirtazapine, duloxetine. Memory Care staff assistance with cares, meals, activities, med admin.   Her sister Bacilio was POA. Bacilio's  Amandeep Velazquez is now POA and medical decision maker-left him a message.     (M54.50) Acute left-sided low back pain, unspecified whether sciatica present  Comment: resolved  Plan: discontinue lidocaine patch. She requests continuing scheduled tylenol     (F33.41) Recurrent major depressive disorder, in partial remission (H24)  (F41.9) Anxiety  Comment: exacerbated by cognitive " decline and loss of coping skills   Plan: continue buspar, duloxetine, mirtazapine    (I25.10) Coronary artery disease involving native heart without angina pectoris, unspecified vessel or lesion type  (I70.0) Aortic calcification (H24)  Comment: no chest pain or acute symptoms   Plan: continue ASA, statin       Electronically signed by: ENID Taylor CNP

## 2024-04-05 NOTE — LETTER
4/5/2024        RE: Elzbieta Ivan  [address]  [place] [address]        [facility] GERIATRICS    Chief Complaint   Patient presents with     RECHECK     HPI:  Elzbieta Ivan is a 83 year old  (1940), who is being seen today for an episodic care visit at: [facility]) [269].   She has lived at this facility since 6/2022, initially in AL then moved to Memory Care 12/2022. We assumed primary care 5/2023.   Medical history significant for dementia, major depression, anxiety, suicidal ideation, anemia, GERD, CAD, hyperlipidemia, Hashimoto's,  essential tremor, fibromyalgia, osteoarthritis, osteoporosis.   She was hospitalized on the [facility] Unit 11/28-12/6/2022 with suicidal ideation and a plan. Cymbalta was decreased from 120 mg daily to 90 mg due to potential side effects of mood instability and irritability. Xanax was dc'd. Mirtazapine and prn seroquel were started. She returned to the facility and moved to Memory Care for a higher level of care.   She was seen by Dr Giang at [facility] 3/7/2023 and rivastigmine was started. Note indicates that Neuropsych testing showed memory markedly affected,  executive abilities moderately affected, verbal processing mild to moderately affected, nonverbal processing minimally affected. Findings were consistent with Alzheimer's disease.     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 She is a poor historian. Conversation is more disorganized than usual.  Reports back pain has resolved.  "Denies fall or injury. States that she read an article about a physician at [place] who has a new treatment for \"people with my brain issues\" but she's not sure if she wants to go to [place]. As usual, she becomes more anxious during the visit, talking about wanting to move and live independently. She feels that other residents on the unit were \"dumped here by their families.\"  Up ad katharina without a device and independent with cares.       Allergies, and PMH/PSH reviewed in EPIC today    REVIEW OF SYSTEMS:  Limited due to dementia. Positives as noted under HPI    Objective:   /78   Pulse 80   Temp 97.5  F (36.4  C)   Resp 16   Wt 54.6 kg (120 lb 6.4 oz)   BMI 23.51 kg/m    Exam unchanged   GENERAL APPEARANCE:  Alert, in no distress  ENT:  [place]  EYES:  conjunctiva and lids normal  RESP:  lungs clear to auscultation   CV:  regular rate and rhythm, no murmur, no edema  ABDOMEN:  soft, non-tender, no distension  M/S:   gait steady. EVANGELISTA with good strength. 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 She lacks insight re: her deficits. Hallucinations improved with seroquel   Plan: continue seroquel, buspar, mirtazapine, duloxetine. Memory Care staff assistance with cares, meals, activities, med admin.   Her sister Bacilio was POA. Bacilio's  Amandeep Velazquez is now POA and medical decision maker-left him a message.     (M54.50) Acute left-sided low back pain, unspecified whether sciatica present  Comment: resolved  Plan: discontinue lidocaine patch. She requests continuing scheduled tylenol     (F33.41) Recurrent major " depressive disorder, in partial remission (H24)  (F41.9) Anxiety  Comment: exacerbated by cognitive decline and loss of coping skills   Plan: continue buspar, duloxetine, mirtazapine    (I25.10) Coronary artery disease involving native heart without angina pectoris, unspecified vessel or lesion type  (I70.0) Aortic calcification (H24)  Comment: no chest pain or acute symptoms   Plan: continue ASA, statin       Electronically signed by: ENID Taylor CNP           Sincerely,        ENID Taylor CNP

## 2024-04-08 DIAGNOSIS — R11.0 NAUSEA: Primary | ICD-10-CM

## 2024-04-08 RX ORDER — ONDANSETRON 4 MG/1
4 TABLET, ORALLY DISINTEGRATING ORAL EVERY 8 HOURS PRN
Qty: 10 TABLET | Refills: 97 | Status: SHIPPED | OUTPATIENT
Start: 2024-04-08

## 2024-04-25 DIAGNOSIS — I25.10 CORONARY ARTERY DISEASE INVOLVING NATIVE HEART WITHOUT ANGINA PECTORIS, UNSPECIFIED VESSEL OR LESION TYPE: ICD-10-CM

## 2024-04-25 DIAGNOSIS — K21.9 GASTROESOPHAGEAL REFLUX DISEASE, UNSPECIFIED WHETHER ESOPHAGITIS PRESENT: Primary | ICD-10-CM

## 2024-04-25 DIAGNOSIS — M81.0 AGE-RELATED OSTEOPOROSIS WITHOUT CURRENT PATHOLOGICAL FRACTURE: ICD-10-CM

## 2024-04-26 RX ORDER — CHOLECALCIFEROL (VITAMIN D3) 25 MCG
1 TABLET ORAL DAILY
Qty: 90 TABLET | Refills: 3 | Status: SHIPPED | OUTPATIENT
Start: 2024-04-26

## 2024-04-26 RX ORDER — PANTOPRAZOLE SODIUM 40 MG/1
40 TABLET, DELAYED RELEASE ORAL DAILY
Qty: 90 TABLET | Refills: 3 | Status: SHIPPED | OUTPATIENT
Start: 2024-04-26

## 2024-04-26 RX ORDER — ASPIRIN 81 MG/1
81 TABLET, COATED ORAL DAILY
Qty: 90 TABLET | Refills: 3 | Status: SHIPPED | OUTPATIENT
Start: 2024-04-26

## 2024-05-14 NOTE — PROGRESS NOTES
"[facility] GERIATRICS    Chief Complaint   Patient presents with    RECHECK     HPI:  Elzbieta Ivan is a 83 year old  (1940), who is being seen today for an episodic care visit at: [facility] ([facility]) [269].   She has lived at this facility since 6/2022, initially in AL then moved to Memory Care 12/2022. We assumed primary care 5/2023.   Medical history significant for dementia, major depression, anxiety, suicidal ideation, anemia, GERD, CAD, hyperlipidemia, Hashimoto's,  essential tremor, fibromyalgia, osteoarthritis, osteoporosis.   She was hospitalized on the [facility] 11/28-12/6/2022 with suicidal ideation and a plan. Cymbalta was decreased from 120 mg daily to 90 mg due to potential side effects of mood instability and irritability. Xanax was dc'd. Mirtazapine and prn seroquel were started. She returned to the facility and moved to Memory Care for a higher level of care.   She was seen by Dr Giang at [facility] 3/7/2023 and rivastigmine was started. Note indicates that Neuropsych testing showed memory markedly affected,  executive abilities moderately affected, verbal processing mild to moderately affected, nonverbal processing minimally affected. Findings were consistent with Alzheimer's disease.     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 Very anxious and repetitive, focused on wanting to move out of the facility to an apartment on her own. She's not as easy to redirect today. Reports poor sleep, \"because I can't stay here another day.\" Denies feeling ill. Staff reports her appetite is down. Remains independent with cares, ambulates without a " device. Lives with her little dog.   Her sister Bacilio was POA and recently . Bacilio's  Amandeep Velazquez is now POA and we spoke on the phone. She has also been talking to him about moving. She has a close friend who takes her out for groceries and activities, which is helpful. Amandeep reports she eats well when out with him and friends.       Allergies, and PMH/PSH reviewed in EPIC today    REVIEW OF SYSTEMS:  Limited due to dementia. Positives as noted under HPI     Objective:   BP (!) 156/59   Pulse 86   Temp 98.1  F (36.7  C)   Resp 16   Wt 56.2 kg (124 lb)   BMI 24.22 kg/m    GENERAL APPEARANCE:  Alert, anxious   ENT:  [place]  EYES:  conjunctiva and lids normal  RESP:  lungs clear to auscultation   CV:  regular rate and rhythm, no murmur, no edema  ABDOMEN:  soft, non-tender, no distension  M/S:   gait steady. EVANGELISTA with good strength. No joint inflammation   SKIN:  no rashes or open areas  PSYCH:  oriented to self, place, situation, insight and judgement impaired, word finding difficulty, memory impaired     Recent labs in [facility] reviewed by me today.     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 Hallucinations have improved with seroquel. Anxious and repetitive today.   Discussed with her brother in law/POA  Amandeep Velazquez, who has a good understanding of her cognitive decline, anxiety and poor insight.   Plan: increase mirtazapine to 15 mg HS for insomnia and anxiety. Continue seroquel, buspar, duloxetine. Memory Care staff assistance with cares, meals, activities, med admin. Encourage activities, socialization.   Discussed with staff     (F33.41) Recurrent major depressive disorder, in partial remission (H24)  (F41.9) Anxiety  Comment: long standing and predates dementia. Now exacerbated by  cognitive decline and loss of coping skills   Plan: increase mirtazapine to 15 mg HS. Continue buspirone 7.5 mg bid, duloxetine 90 mg daily, seroquel as above. Consider reducing duloxetine due to potential side effects of insomnia, restlessness, irritability.     (I25.10) Coronary artery disease involving native heart without angina pectoris, unspecified vessel or lesion type  (I70.0) Aortic calcification (H24)  Comment: no acute issues   Plan: continue ASA, statin     Total time of today's visit in the AL setting was 60 mins, including patient visit, chart review, phone call with POA and discussion with staff. Greater than 50% of total time spent in counseling and coordinating care due to progressive dementia and chronic mental health issues. Counseling patient and brother in law re: nature of dementia, medications and potential side effects, plan of care. Coordinating the following with facility staff: medication orders, behavior management, plan of care.       Electronically signed by: ENID Taylor CNP

## 2024-05-15 ENCOUNTER — ASSISTED LIVING VISIT (OUTPATIENT)
Dept: GERIATRICS | Facility: CLINIC | Age: 84
End: 2024-05-15
Payer: COMMERCIAL

## 2024-05-15 VITALS
WEIGHT: 124 LBS | SYSTOLIC BLOOD PRESSURE: 156 MMHG | DIASTOLIC BLOOD PRESSURE: 59 MMHG | RESPIRATION RATE: 16 BRPM | TEMPERATURE: 98.1 F | HEART RATE: 86 BPM | BODY MASS INDEX: 24.22 KG/M2

## 2024-05-15 DIAGNOSIS — R44.3 HALLUCINATIONS: ICD-10-CM

## 2024-05-15 DIAGNOSIS — F33.41 RECURRENT MAJOR DEPRESSIVE DISORDER, IN PARTIAL REMISSION (H): ICD-10-CM

## 2024-05-15 DIAGNOSIS — G30.1 MODERATE LATE ONSET ALZHEIMER'S DEMENTIA WITH PSYCHOTIC DISTURBANCE (H): Primary | ICD-10-CM

## 2024-05-15 DIAGNOSIS — F02.B2 MODERATE LATE ONSET ALZHEIMER'S DEMENTIA WITH PSYCHOTIC DISTURBANCE (H): Primary | ICD-10-CM

## 2024-05-15 DIAGNOSIS — F22 DELUSIONS (H): ICD-10-CM

## 2024-05-15 DIAGNOSIS — I25.10 CORONARY ARTERY DISEASE INVOLVING NATIVE HEART WITHOUT ANGINA PECTORIS, UNSPECIFIED VESSEL OR LESION TYPE: ICD-10-CM

## 2024-05-15 DIAGNOSIS — I70.0 AORTIC CALCIFICATION (H): ICD-10-CM

## 2024-05-15 DIAGNOSIS — F41.9 ANXIETY: ICD-10-CM

## 2024-05-15 PROCEDURE — 99350 HOME/RES VST EST HIGH MDM 60: CPT | Performed by: NURSE PRACTITIONER

## 2024-05-15 RX ORDER — MIRTAZAPINE 15 MG/1
15 TABLET, FILM COATED ORAL AT BEDTIME
Qty: 30 TABLET | Refills: 11 | Status: SHIPPED | OUTPATIENT
Start: 2024-05-15

## 2024-05-15 NOTE — LETTER
5/15/2024        RE: Elzbieta Ivan  [address]  [place] [address]        No notes on file      Sincerely,        ENID Taylor CNP

## 2024-05-20 DIAGNOSIS — G30.1 MODERATE LATE ONSET ALZHEIMER'S DEMENTIA WITH PSYCHOTIC DISTURBANCE (H): ICD-10-CM

## 2024-05-20 DIAGNOSIS — F02.B2 MODERATE LATE ONSET ALZHEIMER'S DEMENTIA WITH PSYCHOTIC DISTURBANCE (H): ICD-10-CM

## 2024-05-21 RX ORDER — QUETIAPINE FUMARATE 25 MG/1
TABLET, FILM COATED ORAL
Qty: 180 TABLET | Refills: 97 | Status: SHIPPED | OUTPATIENT
Start: 2024-05-21 | End: 2024-08-21 | Stop reason: ALTCHOICE

## 2024-06-09 ENCOUNTER — TELEPHONE (OUTPATIENT)
Dept: GERIATRICS | Facility: CLINIC | Age: 84
End: 2024-06-09
Payer: COMMERCIAL

## 2024-06-10 NOTE — TELEPHONE ENCOUNTER
Nursing called this afternoon due to fall for Elzbieta.    Took a hit right above right brow, abrasion elsewhere on body.  Complained of rib pain but mild.  Offered a rib detail x-ray but decided to wait to see how she is tomorrow and nursing can all triage if feeling she needs an -xray.    ENID Baker CNP

## 2024-07-03 ENCOUNTER — HOSPITAL ENCOUNTER (EMERGENCY)
Facility: CLINIC | Age: 84
Discharge: NURSING, REHAB OR LONG-TERM CARE | End: 2024-07-03
Attending: EMERGENCY MEDICINE | Admitting: EMERGENCY MEDICINE
Payer: COMMERCIAL

## 2024-07-03 ENCOUNTER — ASSISTED LIVING VISIT (OUTPATIENT)
Dept: GERIATRICS | Facility: CLINIC | Age: 84
End: 2024-07-03
Payer: COMMERCIAL

## 2024-07-03 VITALS
RESPIRATION RATE: 18 BRPM | HEART RATE: 85 BPM | SYSTOLIC BLOOD PRESSURE: 108 MMHG | DIASTOLIC BLOOD PRESSURE: 64 MMHG | WEIGHT: 122.4 LBS | BODY MASS INDEX: 23.9 KG/M2

## 2024-07-03 VITALS
HEART RATE: 81 BPM | RESPIRATION RATE: 16 BRPM | DIASTOLIC BLOOD PRESSURE: 78 MMHG | SYSTOLIC BLOOD PRESSURE: 139 MMHG | OXYGEN SATURATION: 96 % | TEMPERATURE: 97.5 F

## 2024-07-03 DIAGNOSIS — R55 PRE-SYNCOPE: Primary | ICD-10-CM

## 2024-07-03 DIAGNOSIS — R55 VASOVAGAL NEAR SYNCOPE: ICD-10-CM

## 2024-07-03 DIAGNOSIS — R44.3 HALLUCINATIONS: ICD-10-CM

## 2024-07-03 DIAGNOSIS — G30.1 MODERATE LATE ONSET ALZHEIMER'S DEMENTIA WITH PSYCHOTIC DISTURBANCE (H): ICD-10-CM

## 2024-07-03 DIAGNOSIS — F41.9 ANXIETY: ICD-10-CM

## 2024-07-03 DIAGNOSIS — M62.81 GENERALIZED MUSCLE WEAKNESS: ICD-10-CM

## 2024-07-03 DIAGNOSIS — F02.B2 MODERATE LATE ONSET ALZHEIMER'S DEMENTIA WITH PSYCHOTIC DISTURBANCE (H): ICD-10-CM

## 2024-07-03 DIAGNOSIS — N18.9 CHRONIC RENAL FAILURE, UNSPECIFIED CKD STAGE: ICD-10-CM

## 2024-07-03 DIAGNOSIS — F33.41 RECURRENT MAJOR DEPRESSIVE DISORDER, IN PARTIAL REMISSION (H): ICD-10-CM

## 2024-07-03 DIAGNOSIS — I25.10 CORONARY ARTERY DISEASE INVOLVING NATIVE HEART WITHOUT ANGINA PECTORIS, UNSPECIFIED VESSEL OR LESION TYPE: ICD-10-CM

## 2024-07-03 DIAGNOSIS — F22 DELUSIONS (H): ICD-10-CM

## 2024-07-03 LAB
ALBUMIN UR-MCNC: 30 MG/DL
ANION GAP SERPL CALCULATED.3IONS-SCNC: 8 MMOL/L (ref 7–15)
APPEARANCE UR: CLEAR
ATRIAL RATE - MUSE: 82 BPM
BASOPHILS # BLD AUTO: 0.1 10E3/UL (ref 0–0.2)
BASOPHILS NFR BLD AUTO: 1 %
BILIRUB UR QL STRIP: NEGATIVE
BUN SERPL-MCNC: 15.6 MG/DL (ref 8–23)
CALCIUM SERPL-MCNC: 9.1 MG/DL (ref 8.8–10.2)
CHLORIDE SERPL-SCNC: 107 MMOL/L (ref 98–107)
COLOR UR AUTO: YELLOW
CREAT SERPL-MCNC: 1.01 MG/DL (ref 0.51–0.95)
DEPRECATED HCO3 PLAS-SCNC: 28 MMOL/L (ref 22–29)
DIASTOLIC BLOOD PRESSURE - MUSE: NORMAL MMHG
EGFRCR SERPLBLD CKD-EPI 2021: 56 ML/MIN/1.73M2
EOSINOPHIL # BLD AUTO: 0.4 10E3/UL (ref 0–0.7)
EOSINOPHIL NFR BLD AUTO: 4 %
ERYTHROCYTE [DISTWIDTH] IN BLOOD BY AUTOMATED COUNT: 13.3 % (ref 10–15)
GLUCOSE SERPL-MCNC: 103 MG/DL (ref 70–99)
GLUCOSE UR STRIP-MCNC: NEGATIVE MG/DL
HCT VFR BLD AUTO: 36.4 % (ref 35–47)
HGB BLD-MCNC: 11.1 G/DL (ref 11.7–15.7)
HGB UR QL STRIP: NEGATIVE
HYALINE CASTS: 43 /LPF
IMM GRANULOCYTES # BLD: 0 10E3/UL
IMM GRANULOCYTES NFR BLD: 0 %
INTERPRETATION ECG - MUSE: NORMAL
KETONES UR STRIP-MCNC: NEGATIVE MG/DL
LEUKOCYTE ESTERASE UR QL STRIP: ABNORMAL
LYMPHOCYTES # BLD AUTO: 1.7 10E3/UL (ref 0.8–5.3)
LYMPHOCYTES NFR BLD AUTO: 18 %
MCH RBC QN AUTO: 28.1 PG (ref 26.5–33)
MCHC RBC AUTO-ENTMCNC: 30.5 G/DL (ref 31.5–36.5)
MCV RBC AUTO: 92 FL (ref 78–100)
MONOCYTES # BLD AUTO: 0.5 10E3/UL (ref 0–1.3)
MONOCYTES NFR BLD AUTO: 6 %
MUCOUS THREADS #/AREA URNS LPF: PRESENT /LPF
NEUTROPHILS # BLD AUTO: 6.7 10E3/UL (ref 1.6–8.3)
NEUTROPHILS NFR BLD AUTO: 71 %
NITRATE UR QL: NEGATIVE
NRBC # BLD AUTO: 0 10E3/UL
NRBC BLD AUTO-RTO: 0 /100
P AXIS - MUSE: 66 DEGREES
PH UR STRIP: 6.5 [PH] (ref 5–7)
PLATELET # BLD AUTO: 266 10E3/UL (ref 150–450)
POTASSIUM SERPL-SCNC: 3.7 MMOL/L (ref 3.4–5.3)
PR INTERVAL - MUSE: 170 MS
QRS DURATION - MUSE: 74 MS
QT - MUSE: 382 MS
QTC - MUSE: 446 MS
R AXIS - MUSE: 86 DEGREES
RBC # BLD AUTO: 3.95 10E6/UL (ref 3.8–5.2)
RBC URINE: 1 /HPF
SODIUM SERPL-SCNC: 143 MMOL/L (ref 135–145)
SP GR UR STRIP: 1.03 (ref 1–1.03)
SYSTOLIC BLOOD PRESSURE - MUSE: NORMAL MMHG
T AXIS - MUSE: 30 DEGREES
TROPONIN T SERPL HS-MCNC: 11 NG/L
UROBILINOGEN UR STRIP-MCNC: 2 MG/DL
VENTRICULAR RATE- MUSE: 82 BPM
WBC # BLD AUTO: 9.5 10E3/UL (ref 4–11)
WBC URINE: 5 /HPF

## 2024-07-03 PROCEDURE — 36415 COLL VENOUS BLD VENIPUNCTURE: CPT | Performed by: EMERGENCY MEDICINE

## 2024-07-03 PROCEDURE — 93005 ELECTROCARDIOGRAM TRACING: CPT

## 2024-07-03 PROCEDURE — 96361 HYDRATE IV INFUSION ADD-ON: CPT

## 2024-07-03 PROCEDURE — 99350 HOME/RES VST EST HIGH MDM 60: CPT | Performed by: NURSE PRACTITIONER

## 2024-07-03 PROCEDURE — 84484 ASSAY OF TROPONIN QUANT: CPT | Performed by: EMERGENCY MEDICINE

## 2024-07-03 PROCEDURE — 82565 ASSAY OF CREATININE: CPT | Performed by: EMERGENCY MEDICINE

## 2024-07-03 PROCEDURE — 96360 HYDRATION IV INFUSION INIT: CPT

## 2024-07-03 PROCEDURE — 82374 ASSAY BLOOD CARBON DIOXIDE: CPT | Performed by: EMERGENCY MEDICINE

## 2024-07-03 PROCEDURE — 81001 URINALYSIS AUTO W/SCOPE: CPT | Performed by: EMERGENCY MEDICINE

## 2024-07-03 PROCEDURE — 99284 EMERGENCY DEPT VISIT MOD MDM: CPT | Mod: 25

## 2024-07-03 PROCEDURE — 258N000003 HC RX IP 258 OP 636: Performed by: EMERGENCY MEDICINE

## 2024-07-03 PROCEDURE — 87086 URINE CULTURE/COLONY COUNT: CPT | Performed by: EMERGENCY MEDICINE

## 2024-07-03 PROCEDURE — 85025 COMPLETE CBC W/AUTO DIFF WBC: CPT | Performed by: EMERGENCY MEDICINE

## 2024-07-03 RX ADMIN — SODIUM CHLORIDE 500 ML: 9 INJECTION, SOLUTION INTRAVENOUS at 12:26

## 2024-07-03 ASSESSMENT — ACTIVITIES OF DAILY LIVING (ADL)
ADLS_ACUITY_SCORE: 37

## 2024-07-03 ASSESSMENT — COLUMBIA-SUICIDE SEVERITY RATING SCALE - C-SSRS
1. IN THE PAST MONTH, HAVE YOU WISHED YOU WERE DEAD OR WISHED YOU COULD GO TO SLEEP AND NOT WAKE UP?: NO
2. HAVE YOU ACTUALLY HAD ANY THOUGHTS OF KILLING YOURSELF IN THE PAST MONTH?: NO
6. HAVE YOU EVER DONE ANYTHING, STARTED TO DO ANYTHING, OR PREPARED TO DO ANYTHING TO END YOUR LIFE?: NO

## 2024-07-03 NOTE — ED NOTES
Diane BUITRAGO, director of health services from [address] [place] [address] called back, report given. Informed her patient will be coming back via EMS.

## 2024-07-03 NOTE — PROGRESS NOTES
[facility] GERIATRICS    Chief Complaint   Patient presents with    RECHECK     HPI:  Elzbieta Ivan is a 83 year old  (1940), who is being seen today for an episodic care visit at: [facility] ([facility]) [269].   She has lived at this facility since 6/2022, initially in AL then moved to Memory Care 12/2022. We assumed primary care 5/2023.   Medical history significant for dementia, major depression, anxiety, suicidal ideation, anemia, GERD, CAD, hyperlipidemia, Hashimoto's,  essential tremor, fibromyalgia, osteoarthritis, osteoporosis.   She was hospitalized on the [facility] 11/28-12/6/2022 with suicidal ideation and a plan. Cymbalta was decreased from 120 mg daily to 90 mg due to potential side effects of mood instability and irritability. Xanax was dc'd. Mirtazapine and prn seroquel were started. She returned to the facility and moved to Memory Care for a higher level of care.   She was seen by Dr Giang at [facility] 3/7/2023 and rivastigmine was started. Note indicates that Neuropsych testing showed memory markedly affected,  executive abilities moderately affected, verbal processing mild to moderately affected, nonverbal processing minimally affected. Findings were consistent with Alzheimer's disease.     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 Nurses report patient felt dizzy and/or faint, lowered herself to the ground and was unable to get up. She had a small emesis. She is a poor historian. Initially sitting on the floor in her bathroom. Was able  "to get up and walk to the couch with assist of 2. Reports feeling \"shaky and weak.\" States that for the past few days, she \"breaks out in a sweat everywhere, then I'm cold.\" Denies cough, shortness of breath or chest pain. No diarrhea or abdominal pain. She looks thinner today and her rings are slipping off her fingers. Nurses report that she has meals in her room and her intake has been poor.   Most recent weight of 122 lbs was taken 6/17/2024, down 2 lbs in a month.   She is usually up and about without a device and independent with cares.   Initial /64 HR 85. After walking to her couch, /73  HR 65    Allergies, and PMH/PSH reviewed in EPIC today    REVIEW OF SYSTEMS:  Limited due to dementia. Positives as noted under HPI     Objective:   /64   Pulse 85   Resp 18   Wt 55.5 kg (122 lb 6.4 oz)   BMI 23.90 kg/m    GENERAL APPEARANCE:  Alert, appears pale and fatigued   ENT:  [place]  EYES:  conjunctiva and lids normal  RESP:  lungs clear to auscultation, no crackles or wheezes   CV:  regular rate and rhythm, no murmur, no edema  ABDOMEN:  soft, non-tender, no distension  M/S:  gait unsteady with assist of 2. EVANGELISTA, hand grasps equal. No joint inflammation   SKIN:  no rashes or open areas  PSYCH:  oriented to self, place, situation, insight and judgement impaired, memory impaired     Recent labs in [facility] reviewed by me today.     ASSESSMENT / PLAN:  (R55) Pre-syncope  (primary encounter diagnosis)  (M62.81) Generalized muscle weakness  Comment: suspect pre syncope, most likely related to poor oral intake and dehydration  Plan: send to ED for labs and further evaluation.   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 Long standing " depression and anxiety. She's had a difficult time following the recent death of her sister.   Plan: continue mirtazapine, buspirone, duloxetine, seroquel. Memory Care staff assistance with cares, meals, activities, med admin    (I25.10) Coronary artery disease involving native heart without angina pectoris, unspecified vessel or lesion type  Comment: no chest pain or acute cardiac symptoms   Plan: continue ASA, statin         Electronically signed by: ENID Taylor CNP

## 2024-07-03 NOTE — ED NOTES
Bed: ED14  Expected date:   Expected time:   Means of arrival:   Comments:  Erwin [address] 83F syncope ETA 1205p

## 2024-07-03 NOTE — ED TRIAGE NOTES
Came by ambulance from [facility], , staff stated to ems pt was near syncopal, didn't totally loose consiousness, pt states she has had this before, she got hot then had the episode, no fall , vss for ems 138/72 HR 65,  walked to stretcher not dizzy, not unsteady

## 2024-07-03 NOTE — ED NOTES
Called and told staff that patient will be coming back via EMS. Instructed them to have their nurse call us back for report due to their nurses being unable to take report at the time. [facility] EMS stretcher ride arranged for patient.

## 2024-07-03 NOTE — DISCHARGE INSTRUCTIONS
Stay hydrated, make sure you are not overdressed so you do not get hot.  If you ever feel hot, remove your sweater.  Drink some water and rest.  Follow-up with your doctor as needed.

## 2024-07-03 NOTE — ED PROVIDER NOTES
Emergency Department Note      History of Present Illness     Chief Complaint   Syncope      HPI   Elzbieta Ivan is a 83 year old female who has a history of dementia presents to the emergency department for the evaluation of lightheadedness. According to EMS, she experienced lightheadedness and felt presyncopal earlier today and a witness helped her to a chair. She lives in memory care where she was picked by EMS from her room. She reports experiencing similar episodes in the past that have involved flashes of feeling hot and sweaty. She denies any new pain today.  She said she felt really hot for a moment started sweating and then she felt lightheaded but never passed out fully.    Independent Historian   EMS as detailed above.    Review of External Notes   I reviewed her assisted living visit from geriatrics today.    Past Medical History     Medical History and Problem List   Abnormal carotid ultrasound  Acute laryngitis  Adhesive capsulitis of shoulder  Osteoporosis  Anemia  Anxiety  Aortic calcification  Benign neoplasm of colon  CAD  Circadian rhythm sleep disorder  Depression  Disorder of skin or subcutaneous tissue  Essential tremor  Fibromyalgia  GERD  Hyperlipidemia  Hyperplastic colon polyp  Left ventricular hypertrophy  Migraine  Non-toxic nodular goiter  Occlusion of carotid artery without cerebral infarction  Osteoarthritis of multiple joints  Osteopenia  Polyp of vagina  Primary osteoporosis of both hands  Alzheimer's dementia with psychotic disturbance   Sleep apnea  Myalgia  Occlusion of carotid artery without cerebral infarction  Osteoporosis of left knee  Hypercholesterolemia  Vascular calcification   Synovial cyst  Suicidal ideation  Arrhythmia  Dysphagia  Proximal humerus fracture    Medications   Biotin   Buspar  Cymbalta  Remeron  Protonix  Exelon  Zocor     Surgical History   Laminectomy lumbar one level  Reverse arthroplasty shoulder, left  Liposuction   Appendectomy    section  Eye surgery  Gyn surgery  Orthopedic surgery, left   Puncture aspiration abscess, hematoma, cyst  Synovial cyst excision  Tonsillectomy   Hysterectomy   Physical Exam     Patient Vitals for the past 24 hrs:   BP Temp Temp src Pulse Resp SpO2   07/03/24 1217 139/76 -- -- -- -- --   07/03/24 1215 -- 97.5  F (36.4  C) Temporal 75 16 97 %     Physical Exam  Nursing note and vitals reviewed.    Constitutional:  Appears comfortable.    HENT:                Nose normal.  No discharge.      Oral mucosa is moist.  Eyes:    Conjunctivae are normal without injection.  Pupils are equal.  Cardiovascular:  Normal rate, regular rhythm with normal S1 and S2.      Normal heart sounds and peripheral pulses 2+ and equal.    Pulmonary:  Effort normal and breath sounds clear to auscultation bilaterally.    No respiratory distress.    GI:    Soft. No distension and no mass. No tenderness.   Musculoskeletal:  Normal range of motion. No extremity deformity.     No edema and no tenderness.    Neurological:   Alert and appropriate to the moment but obvious memory loss is noted.  No focal weakness.  No hand drift, no leg drift.  Heel-knee-shin and finger-to-nose are normal.  No facial droop.  Speech is clear.  Skin:    Skin is warm and dry. No rash noted.   Psychiatric:   Behavior is normal. Appropriate mood and affect.     Judgment and thought content normal.      Diagnostics     Lab Results   Labs Ordered and Resulted from Time of ED Arrival to Time of ED Departure   BASIC METABOLIC PANEL - Abnormal       Result Value    Sodium 143      Potassium 3.7      Chloride 107      Carbon Dioxide (CO2) 28      Anion Gap 8      Urea Nitrogen 15.6      Creatinine 1.01 (*)     GFR Estimate 56 (*)     Calcium 9.1      Glucose 103 (*)    ROUTINE UA WITH MICROSCOPIC REFLEX TO CULTURE - Abnormal    Color Urine Yellow      Appearance Urine Clear      Glucose Urine Negative      Bilirubin Urine Negative      Ketones Urine Negative      Specific  Gravity Urine 1.027      Blood Urine Negative      pH Urine 6.5      Protein Albumin Urine 30 (*)     Urobilinogen Urine 2.0      Nitrite Urine Negative      Leukocyte Esterase Urine Moderate (*)     Mucus Urine Present (*)     RBC Urine 1      WBC Urine 5      Hyaline Casts Urine 43 (*)    CBC WITH PLATELETS AND DIFFERENTIAL - Abnormal    WBC Count 9.5      RBC Count 3.95      Hemoglobin 11.1 (*)     Hematocrit 36.4      MCV 92      MCH 28.1      MCHC 30.5 (*)     RDW 13.3      Platelet Count 266      % Neutrophils 71      % Lymphocytes 18      % Monocytes 6      % Eosinophils 4      % Basophils 1      % Immature Granulocytes 0      NRBCs per 100 WBC 0      Absolute Neutrophils 6.7      Absolute Lymphocytes 1.7      Absolute Monocytes 0.5      Absolute Eosinophils 0.4      Absolute Basophils 0.1      Absolute Immature Granulocytes 0.0      Absolute NRBCs 0.0     TROPONIN T, HIGH SENSITIVITY - Normal    Troponin T, High Sensitivity 11     URINE CULTURE       Imaging   None performed    EKG   ECG taken at 1246, ECG read at 1250  Normal sinus rhythm  Normal ECG   Rate 82 bpm. WY interval 170 ms. QRS duration 74 ms. QT/QTc 382/446 ms. P-R-T axes 66 86 30.    Independent Interpretation   None    ED Course      Medications Administered   Medications   sodium chloride 0.9% BOLUS 500 mL (500 mLs Intravenous $New Bag 7/3/24 1226)       Procedures   Procedures     Discussion of Management   None    ED Course   ED Course as of 07/03/24 1412   Wed Jul 03, 2024   1210 I obtained history and examined the patient as noted above.        Optional/Additional Documentation  None    Medical Decision Making / Diagnosis     CMS Diagnoses: None    MIPS       None    MDM   Elzbieta Ivan is a 83 year old female who has had episodes where she gets hot and then feels lightheaded or feels like she could pass out.  Today she had another 1 of those episodes.  She is completely normal here at her baseline.  Labs are unremarkable other than  some chronic renal failure.  She does not have a UTI.  She got up and ambulated without difficulty.  I think this was a vasovagal near syncopal spell and she may get overheated at times which triggers these.  I encouraged her to make sure she does not overdressed and she should follow-up with her doctor for any further problems.  No stroke symptoms or evidence of cardiac problems here.  She was on the monitor and an EKG was normal.    Stay hydrated, make sure you are not overdressed so you do not get hot.  If you ever feel hot, remove your sweater.  Drink some water and rest.  Follow-up with your doctor as needed.     Disposition   The patient was discharged.     Diagnosis     ICD-10-CM    1. Vasovagal near syncope  R55       2. Chronic renal failure, unspecified CKD stage  N18.9            Discharge Medications   New Prescriptions    No medications on file         Scribe Disclosure:  IDeandra, am serving as a scribe at 1:08 PM on 7/3/2024 to document services personally performed by Bel Salazar MD based on my observations and the provider's statements to me.     Scribe Disclosure:  IIvette, am serving as a scribe at 1:42 PM on 7/3/2024 to document services personally performed by Bel Salazar MD based on my observations and the provider's statements to me.        Bel Salazar MD  07/03/24 7972

## 2024-07-03 NOTE — LETTER
7/3/2024      Elzbieta Ivan  [address]  [place] [address]        [facility] GERIATR[facility]    Chief Complaint   Patient presents with     RECHECK     HPI:  Elzbieta Ivan is a 83 year old  (1940), who is being seen today for an episodic care visit at: [facility]) [[id number]].   She has lived at this facility since 6/2022, initially in AL then moved to Memory Care 12/2022. We assumed primary care 5/2023.   Medical history significant for dementia, major depression, anxiety, suicidal ideation, anemia, GERD, CAD, hyperlipidemia, Hashimoto's,  essential tremor, fibromyalgia, osteoarthritis, osteoporosis.   She was hospitalized on the [facility] 11/28-12/6/2022 with suicidal ideation and a plan. Cymbalta was decreased from 120 mg daily to 90 mg due to potential side effects of mood instability and irritability. Xanax was dc'd. Mirtazapine and prn seroquel were started. She returned to the facility and moved to Memory Care for a higher level of care.   She was seen by Dr Giang at [facility] 3/7/2023 and rivastigmine was started. Note indicates that Neuropsych testing showed memory markedly affected,  executive abilities moderately affected, verbal processing mild to moderately affected, nonverbal processing minimally affected. Findings were consistent with Alzheimer's disease.     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 Nurses report patient felt dizzy and/or faint, lowered herself to the ground and was unable to get up. She had a small  "emesis. She is a poor historian. Initially sitting on the floor in her bathroom. Was able to get up and walk to the couch with assist of 2. Reports feeling \"shaky and weak.\" States that for the past few days, she \"breaks out in a sweat everywhere, then I'm cold.\" Denies cough, shortness of breath or chest pain. No diarrhea or abdominal pain. She looks thinner today and her rings are slipping off her fingers. Nurses report that she has meals in her room and her intake has been poor.   Most recent weight of 122 lbs was taken 6/17/2024, down 2 lbs in a month.   She is usually up and about without a device and independent with cares.   Initial /64 HR 85. After walking to her couch, /73  HR 65    Allergies, and PMH/PSH reviewed in EPIC today    REVIEW OF SYSTEMS:  Limited due to dementia. Positives as noted under HPI     Objective:   /64   Pulse 85   Resp 18   Wt 55.5 kg (122 lb 6.4 oz)   BMI 23.90 kg/m    GENERAL APPEARANCE:  Alert, appears pale and fatigued   ENT:  [place]  EYES:  conjunctiva and lids normal  RESP:  lungs clear to auscultation, no crackles or wheezes   CV:  regular rate and rhythm, no murmur, no edema  ABDOMEN:  soft, non-tender, no distension  M/S:  gait unsteady with assist of 2. EVANGELISTA, hand grasps equal. No joint inflammation   SKIN:  no rashes or open areas  PSYCH:  oriented to self, place, situation, insight and judgement impaired, memory impaired     Recent labs in [facility] reviewed by me today.     ASSESSMENT / PLAN:  (R55) Pre-syncope  (primary encounter diagnosis)  (M62.81) Generalized muscle weakness  Comment: suspect pre syncope, most likely related to poor oral intake and dehydration  Plan: send to ED for labs and further evaluation.   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 Long standing depression and anxiety. She's had a difficult time following the recent death of her sister.   Plan: continue mirtazapine, buspirone, duloxetine, seroquel. Memory Care staff assistance with cares, meals, activities, med admin    (I25.10) Coronary artery disease involving native heart without angina pectoris, unspecified vessel or lesion type  Comment: no chest pain or acute cardiac symptoms   Plan: continue ASA, statin         Electronically signed by: ENID Taylor CNP           Sincerely,        ENID Taylor CNP

## 2024-07-04 LAB — BACTERIA UR CULT: NORMAL

## 2024-07-12 ENCOUNTER — ASSISTED LIVING VISIT (OUTPATIENT)
Dept: GERIATRICS | Facility: CLINIC | Age: 84
End: 2024-07-12
Payer: COMMERCIAL

## 2024-07-12 VITALS
BODY MASS INDEX: 23.9 KG/M2 | TEMPERATURE: 97.9 F | HEART RATE: 82 BPM | SYSTOLIC BLOOD PRESSURE: 127 MMHG | RESPIRATION RATE: 17 BRPM | DIASTOLIC BLOOD PRESSURE: 78 MMHG | WEIGHT: 122.4 LBS

## 2024-07-12 DIAGNOSIS — G30.1 MODERATE LATE ONSET ALZHEIMER'S DEMENTIA WITH PSYCHOTIC DISTURBANCE (H): ICD-10-CM

## 2024-07-12 DIAGNOSIS — N18.31 STAGE 3A CHRONIC KIDNEY DISEASE (H): Primary | ICD-10-CM

## 2024-07-12 DIAGNOSIS — R44.3 HALLUCINATIONS: ICD-10-CM

## 2024-07-12 DIAGNOSIS — F02.B2 MODERATE LATE ONSET ALZHEIMER'S DEMENTIA WITH PSYCHOTIC DISTURBANCE (H): ICD-10-CM

## 2024-07-12 DIAGNOSIS — F33.41 RECURRENT MAJOR DEPRESSIVE DISORDER, IN PARTIAL REMISSION (H): ICD-10-CM

## 2024-07-12 DIAGNOSIS — D64.9 ANEMIA, UNSPECIFIED TYPE: ICD-10-CM

## 2024-07-12 DIAGNOSIS — F22 DELUSIONS (H): ICD-10-CM

## 2024-07-12 DIAGNOSIS — F41.9 ANXIETY: ICD-10-CM

## 2024-07-12 PROCEDURE — 99349 HOME/RES VST EST MOD MDM 40: CPT | Performed by: NURSE PRACTITIONER

## 2024-07-12 NOTE — PROGRESS NOTES
"[facility] GERIATRICS    Chief Complaint   Patient presents with    RECHECK     HPI:  Elzbieta Ivan is a 83 year old  (1940), who is being seen today for an episodic care visit at: [facility] ([facility]) [269].   She has lived at this facility since 6/2022, initially in AL then moved to Memory Care 12/2022. We assumed primary care 5/2023.   Medical history significant for dementia, major depression, anxiety, suicidal ideation, anemia, GERD, CAD, hyperlipidemia, Hashimoto's,  essential tremor, fibromyalgia, osteoarthritis, osteoporosis.   She was hospitalized on the [facility] 11/28-12/6/2022 with suicidal ideation and a plan. Cymbalta was decreased from 120 mg daily to 90 mg due to potential side effects of mood instability and irritability. Xanax was dc'd. Mirtazapine and prn seroquel were started. She returned to the facility and moved to Memory Care for a higher level of care.   She was seen by Dr Giang at [facility] 3/7/2023 and rivastigmine was started. Note indicates that Neuropsych testing showed memory markedly affected,  executive abilities moderately affected, verbal processing mild to moderately affected, nonverbal processing minimally affected. Findings were consistent with Alzheimer's disease.     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 Creatinine was up slightly at 1.01, Hgb 11.1. She received IVF and returned to the facility.   She remembers going to the ED and reports feeling \"back to usual.\" Denies feeling dizzy or lightheaded. Good appetite and reports she's drinking plenty of fluids. She's in a good mood today, calm " and conversant. Up ad katharina in her apartment. Independent with cares. Staff reports no problems.     Allergies, and PMH/PSH reviewed in EPIC today    REVIEW OF SYSTEMS:  Limited due to dementia. Positives as noted under HPI    Objective:   /78   Pulse 82   Temp 97.9  F (36.6  C)   Resp 17   Wt 55.5 kg (122 lb 6.4 oz)   BMI 23.90 kg/m    GENERAL APPEARANCE:  Alert, in no distress   ENT:  [place]  EYES:  conjunctiva and lids normal  RESP:  lungs clear to auscultation   CV:  regular rate and rhythm, no murmur, no edema  ABDOMEN:  soft, non-tender, no distension  M/S:  gait steady. EVANGELISTA with good strength. No joint inflammation   SKIN:  no rashes or open areas  PSYCH:  oriented to self, place, situation, insight and judgement impaired, memory impaired     Recent labs in [facility] reviewed by me today.     ASSESSMENT / PLAN:  (N18.31) Stage 3a chronic kidney disease (H)  (primary encounter diagnosis)  Comment: creatinine 1.01 is up from 0.9, likely due to mild dehydration   Plan: encourage fluids. BMP  Discussed with staff     (D64.9) Anemia, unspecified type  Comment: Hgb 11.1 is down from 13.3 on 5/30/2023. Hgb was in the 10-11 range several years ago. No s/s of active bleeding   Plan: recheck Hgb. 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 Memory Care staff assistance with cares, meals, activities, med admin        Electronically signed by: ENID Taylor CNP

## 2024-07-12 NOTE — LETTER
" 7/12/2024      Elzbieta Ivan  [address]  [place] [address]        [facility] GERIATRICS    Chief Complaint   Patient presents with     RECHECK     HPI:  Elzbieta Ivan is a 83 year old  (1940), who is being seen today for an episodic care visit at: [facility]) [[id number]].   She has lived at this facility since 6/2022, initially in AL then moved to Memory Care 12/2022. We assumed primary care 5/2023.   Medical history significant for dementia, major depression, anxiety, suicidal ideation, anemia, GERD, CAD, hyperlipidemia, Hashimoto's,  essential tremor, fibromyalgia, osteoarthritis, osteoporosis.   She was hospitalized on the [facility] 11/28-12/6/2022 with suicidal ideation and a plan. Cymbalta was decreased from 120 mg daily to 90 mg due to potential side effects of mood instability and irritability. Xanax was dc'd. Mirtazapine and prn seroquel were started. She returned to the facility and moved to Memory Care for a higher level of care.   She was seen by Dr Giang at [facility] 3/7/2023 and rivastigmine was started. Note indicates that Neuropsych testing showed memory markedly affected,  executive abilities moderately affected, verbal processing mild to moderately affected, nonverbal processing minimally affected. Findings were consistent with Alzheimer's disease.     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 Creatinine was up slightly at 1.01, Hgb 11.1. She received IVF and returned to the facility.   She remembers going to the ED and reports feeling \"back to usual.\" Denies feeling dizzy or lightheaded. " Good appetite and reports she's drinking plenty of fluids. She's in a good mood today, calm and conversant. Up ad katharina in her apartment. Independent with cares. Staff reports no problems.     Allergies, and PMH/PSH reviewed in EPIC today    REVIEW OF SYSTEMS:  Limited due to dementia. Positives as noted under HPI    Objective:   /78   Pulse 82   Temp 97.9  F (36.6  C)   Resp 17   Wt 55.5 kg (122 lb 6.4 oz)   BMI 23.90 kg/m    GENERAL APPEARANCE:  Alert, in no distress   ENT:  [place]  EYES:  conjunctiva and lids normal  RESP:  lungs clear to auscultation   CV:  regular rate and rhythm, no murmur, no edema  ABDOMEN:  soft, non-tender, no distension  M/S:  gait steady. EVANGELISTA with good strength. No joint inflammation   SKIN:  no rashes or open areas  PSYCH:  oriented to self, place, situation, insight and judgement impaired, memory impaired     Recent labs in [facility] reviewed by me today.     ASSESSMENT / PLAN:  (N18.31) Stage 3a chronic kidney disease (H)  (primary encounter diagnosis)  Comment: creatinine 1.01 is up from 0.9, likely due to mild dehydration   Plan: encourage fluids. BMP  Discussed with staff     (D64.9) Anemia, unspecified type  Comment: Hgb 11.1 is down from 13.3 on 5/30/2023. Hgb was in the 10-11 range several years ago. No s/s of active bleeding   Plan: recheck Hgb. 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 Memory Care staff assistance with cares, meals, activities, med admin        Electronically signed by: ENID Taylor CNP           Sincerely,        ENID Taylor CNP

## 2024-07-15 ENCOUNTER — LAB REQUISITION (OUTPATIENT)
Dept: LAB | Facility: CLINIC | Age: 84
End: 2024-07-15
Payer: COMMERCIAL

## 2024-07-15 DIAGNOSIS — R94.4 ABNORMAL RESULTS OF KIDNEY FUNCTION STUDIES: ICD-10-CM

## 2024-07-15 DIAGNOSIS — D64.9 ANEMIA, UNSPECIFIED: ICD-10-CM

## 2024-07-15 DIAGNOSIS — E86.0 DEHYDRATION: ICD-10-CM

## 2024-07-16 LAB
ANION GAP SERPL CALCULATED.3IONS-SCNC: 10 MMOL/L (ref 7–15)
BUN SERPL-MCNC: 16.9 MG/DL (ref 8–23)
CALCIUM SERPL-MCNC: 8.9 MG/DL (ref 8.8–10.4)
CHLORIDE SERPL-SCNC: 106 MMOL/L (ref 98–107)
CREAT SERPL-MCNC: 1.08 MG/DL (ref 0.51–0.95)
EGFRCR SERPLBLD CKD-EPI 2021: 51 ML/MIN/1.73M2
GLUCOSE SERPL-MCNC: 91 MG/DL (ref 70–99)
HCO3 SERPL-SCNC: 26 MMOL/L (ref 22–29)
HGB BLD-MCNC: 11.2 G/DL (ref 11.7–15.7)
POTASSIUM SERPL-SCNC: 3.6 MMOL/L (ref 3.4–5.3)
SODIUM SERPL-SCNC: 142 MMOL/L (ref 135–145)

## 2024-07-16 PROCEDURE — 36415 COLL VENOUS BLD VENIPUNCTURE: CPT | Mod: ORL | Performed by: NURSE PRACTITIONER

## 2024-07-16 PROCEDURE — P9604 ONE-WAY ALLOW PRORATED TRIP: HCPCS | Mod: ORL | Performed by: NURSE PRACTITIONER

## 2024-07-16 PROCEDURE — 85018 HEMOGLOBIN: CPT | Mod: ORL | Performed by: NURSE PRACTITIONER

## 2024-07-16 PROCEDURE — 80048 BASIC METABOLIC PNL TOTAL CA: CPT | Mod: ORL | Performed by: NURSE PRACTITIONER

## 2024-08-20 NOTE — PROGRESS NOTES
"[facility] GERIATRICS    Chief Complaint   Patient presents with    RECHECK     HPI:  Elzbieta Ivan is a 83 year old  (1940), who is being seen today for an episodic care visit at: [facility] ([facility]) [[id number]].   She has lived at this facility since 6/2022, initially in [place] then moved to Memory Care 12/2022. We assumed primary care 5/2023.   Medical history significant for dementia, major depression, anxiety, suicidal ideation, anemia, GERD, CAD, hyperlipidemia, Hashimoto's,  essential tremor, fibromyalgia, osteoarthritis, osteoporosis.   She was hospitalized on the [facility] 11/28-12/6/2022 with suicidal ideation and a plan. Cymbalta was decreased from 120 mg daily to 90 mg due to potential side effects of mood instability and irritability. Xanax was dc'd. Mirtazapine and prn seroquel were started. She returned to the facility and moved to Memory Care for a higher level of care.   She was seen by Dr Giang at [facility] 3/7/2023 and rivastigmine was started. Note indicates that Neuropsych testing showed memory markedly affected,  executive abilities moderately affected, verbal processing mild to moderately affected, nonverbal processing minimally affected. Findings were consistent with Alzheimer's disease.     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 Pleasant and talkative. She's been intermittently refusing seroquel and states that she doesn't want to take \"the little pink pill\" because \"it knocks me out and gives me terrible nightmares.\" She talks about people being in her room at night, \"they sit here and talk while " "I'm in bed and it annoys me.\" She also talks about \"the people in dark robes who work here at night\", and states they come in her room and go through her things. She thinks the  has sent them. She is \"annoyed\" by this, but not frightened. She's calm throughout our visit, not nearly as anxious and agitated compared to some of our previous visits. She has no other concerns, reports feeling well. Up ad katharina and independent with cares.   Spoke with her brother in law/POA, Amandeep Vealzquez, who feels her mood has been fairly good, but she's anxious at times, and delusions and hallucinations continue. She has told Amandeep that she wants to buy a car and move to an apartment on her own.       Allergies, and PMH/PSH reviewed in EPIC today    REVIEW OF SYSTEMS:  Limited due to dementia. Positives as noted under HPI    Objective:   /67   Pulse 87   Temp 98  F (36.7  C)   Resp 20   Wt 54.6 kg (120 lb 6.4 oz)   BMI 23.51 kg/m    Exam unchanged   GENERAL APPEARANCE:  Alert, in no distress   ENT:  [place]  EYES:  conjunctiva and lids normal  RESP:  lungs clear to auscultation   CV:  regular rate and rhythm, no murmur, no edema  ABDOMEN:  soft, non-tender, no distension  M/S:  gait steady. EVANGELISTA with good strength. No joint inflammation   SKIN:  no rashes or open areas  PSYCH:  oriented to self, place, situation, insight and judgement impaired, memory impaired     Recent labs in [facility] reviewed by me today.       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 She's calm today but more delusional   Plan: discontinue seroquel and start risperidone 0.25 mg bid and 0.25 mg daily prn. Continue buspar, duloxetine, mirtazapine. Memory Care staff assistance with cares, meals, activities, med admin  POA agrees with plan of " care  Consulted with Elyse Lopez Pharm D re: antipsychotic selection and dosing. She recommends trying to decrease cymbalta once stable on risperidone.     (F33.41) Recurrent major depressive disorder, in partial remission (H24)  (F41.9) Anxiety  Comment: long standing, exacerbated by cognitive decline and loss of independence   Mirtazapine was increased 5/2024 with some improvement in anxiety   Plan: meds as above.     (N18.31) Stage 3a chronic kidney disease (H)  Comment: creatinine 1.09 on 7/16/2024. Baseline creatinine 0.8-0.9   Plan: avoid nephrotoxins, renal dose meds. Encourage fluids. Follow BMP    (D64.9) Anemia, unspecified type  Comment: chronic, Hgb 11.2 on 7/16/2024, stable   Plan: follow Hgb prn     (I25.10) Coronary artery disease involving native heart without angina pectoris, unspecified vessel or lesion type  Comment: no acute issues   Plan: continue ASA, statin         Electronically signed by: ENID Taylor CNP

## 2024-08-21 ENCOUNTER — ASSISTED LIVING VISIT (OUTPATIENT)
Dept: GERIATRICS | Facility: CLINIC | Age: 84
End: 2024-08-21
Payer: COMMERCIAL

## 2024-08-21 VITALS
DIASTOLIC BLOOD PRESSURE: 67 MMHG | RESPIRATION RATE: 20 BRPM | WEIGHT: 120.4 LBS | TEMPERATURE: 98 F | SYSTOLIC BLOOD PRESSURE: 114 MMHG | HEART RATE: 87 BPM | BODY MASS INDEX: 23.51 KG/M2

## 2024-08-21 DIAGNOSIS — G30.1 MODERATE LATE ONSET ALZHEIMER'S DEMENTIA WITH PSYCHOTIC DISTURBANCE (H): Primary | ICD-10-CM

## 2024-08-21 DIAGNOSIS — F22 DELUSIONS (H): ICD-10-CM

## 2024-08-21 DIAGNOSIS — I25.10 CORONARY ARTERY DISEASE INVOLVING NATIVE HEART WITHOUT ANGINA PECTORIS, UNSPECIFIED VESSEL OR LESION TYPE: ICD-10-CM

## 2024-08-21 DIAGNOSIS — N18.31 STAGE 3A CHRONIC KIDNEY DISEASE (H): ICD-10-CM

## 2024-08-21 DIAGNOSIS — F33.41 RECURRENT MAJOR DEPRESSIVE DISORDER, IN PARTIAL REMISSION (H): ICD-10-CM

## 2024-08-21 DIAGNOSIS — D64.9 ANEMIA, UNSPECIFIED TYPE: ICD-10-CM

## 2024-08-21 DIAGNOSIS — F02.B2 MODERATE LATE ONSET ALZHEIMER'S DEMENTIA WITH PSYCHOTIC DISTURBANCE (H): Primary | ICD-10-CM

## 2024-08-21 DIAGNOSIS — R44.3 HALLUCINATIONS: ICD-10-CM

## 2024-08-21 DIAGNOSIS — F41.9 ANXIETY: ICD-10-CM

## 2024-08-21 PROCEDURE — 99349 HOME/RES VST EST MOD MDM 40: CPT | Performed by: NURSE PRACTITIONER

## 2024-08-21 RX ORDER — RISPERIDONE 0.5 MG/1
0.25 TABLET ORAL 2 TIMES DAILY
Qty: 45 TABLET | Refills: 11 | Status: SHIPPED | OUTPATIENT
Start: 2024-08-21 | End: 2024-09-30 | Stop reason: ALTCHOICE

## 2024-08-21 NOTE — LETTER
" 8/21/2024      Elzbieta Ivan  [address]  [place] [address]        [facility] GERIATRICS    Chief Complaint   Patient presents with     RECHECK     HPI:  Elzbieta Ivan is a 83 year old  (1940), who is being seen today for an episodic care visit at: [facility]) [269].   She has lived at this facility since 6/2022, initially in [place] then moved to Memory Care 12/2022. We assumed primary care 5/2023.   Medical history significant for dementia, major depression, anxiety, suicidal ideation, anemia, GERD, CAD, hyperlipidemia, Hashimoto's,  essential tremor, fibromyalgia, osteoarthritis, osteoporosis.   She was hospitalized on the [facility] 11/28-12/6/2022 with suicidal ideation and a plan. Cymbalta was decreased from 120 mg daily to 90 mg due to potential side effects of mood instability and irritability. Xanax was dc'd. Mirtazapine and prn seroquel were started. She returned to the facility and moved to Memory Care for a higher level of care.   She was seen by Dr Giang at [facility] 3/7/2023 and rivastigmine was started. Note indicates that Neuropsych testing showed memory markedly affected,  executive abilities moderately affected, verbal processing mild to moderately affected, nonverbal processing minimally affected. Findings were consistent with Alzheimer's disease.     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 Pleasant and talkative. She's been intermittently refusing seroquel and states that she doesn't want to take \"the little pink pill\" because \"it knocks me out and gives me terrible " "nightmares.\" She talks about people being in her room at night, \"they sit here and talk while I'm in bed and it annoys me.\" She also talks about \"the people in dark robes who work here at night\", and states they come in her room and go through her things. She thinks the  has sent them. She is \"annoyed\" by this, but not frightened. She's calm throughout our visit, not nearly as anxious and agitated compared to some of our previous visits. She has no other concerns, reports feeling well. Up ad katharina and independent with cares.   Spoke with her brother in law/POA, Amandeep Velazquez, who feels her mood has been fairly good, but she's anxious at times, and delusions and hallucinations continue. She has told Amandeep that she wants to buy a car and move to an apartment on her own.       Allergies, and PMH/PSH reviewed in EPIC today    REVIEW OF SYSTEMS:  Limited due to dementia. Positives as noted under HPI    Objective:   /67   Pulse 87   Temp 98  F (36.7  C)   Resp 20   Wt 54.6 kg (120 lb 6.4 oz)   BMI 23.51 kg/m    Exam unchanged   GENERAL APPEARANCE:  Alert, in no distress   ENT:  [place]  EYES:  conjunctiva and lids normal  RESP:  lungs clear to auscultation   CV:  regular rate and rhythm, no murmur, no edema  ABDOMEN:  soft, non-tender, no distension  M/S:  gait steady. EVANGELISTA with good strength. No joint inflammation   SKIN:  no rashes or open areas  PSYCH:  oriented to self, place, situation, insight and judgement impaired, memory impaired     Recent labs in [facility] reviewed by me today.       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 She's calm today but more delusional   Plan: discontinue seroquel and start risperidone 0.25 mg bid and 0.25 mg daily prn. Continue buspar, duloxetine, mirtazapine. Memory Care " staff assistance with cares, meals, activities, med admin  POA agrees with plan of care  Consulted with Elyse Lopez Pharm D re: antipsychotic selection and dosing. She recommends trying to decrease cymbalta once stable on risperidone.     (F33.41) Recurrent major depressive disorder, in partial remission (H24)  (F41.9) Anxiety  Comment: long standing, exacerbated by cognitive decline and loss of independence   Mirtazapine was increased 5/2024 with some improvement in anxiety   Plan: meds as above.     (N18.31) Stage 3a chronic kidney disease (H)  Comment: creatinine 1.09 on 7/16/2024. Baseline creatinine 0.8-0.9   Plan: avoid nephrotoxins, renal dose meds. Encourage fluids. Follow BMP    (D64.9) Anemia, unspecified type  Comment: chronic, Hgb 11.2 on 7/16/2024, stable   Plan: follow Hgb prn     (I25.10) Coronary artery disease involving native heart without angina pectoris, unspecified vessel or lesion type  Comment: no acute issues   Plan: continue ASA, statin         Electronically signed by: ENID Taylor CNP           Sincerely,        ENID Taylor CNP

## 2024-08-22 ENCOUNTER — TELEPHONE (OUTPATIENT)
Dept: GERIATRICS | Facility: CLINIC | Age: 84
End: 2024-08-22
Payer: COMMERCIAL

## 2024-08-22 NOTE — TELEPHONE ENCOUNTER
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 PA:  Previously Tried and Failed:    Rationale:  Take 0.5 tablets (0.25 mg) by mouth 2 times daily. And 0.25 mg po daily prn     Insurance   Primary:  [facility] MEDICARE ADVANTAGE   Insurance ID:  [id number]      Secondary (if applicable):  Insurance ID:      Pharmacy Information (if different than what is on RX)  Name:    Phone:    Fax:

## 2024-08-23 NOTE — TELEPHONE ENCOUNTER
Central Prior Authorization Team   Phone: [phone]    PA Initiation    Medication: risperiDONE (RISPERDAL) 0.5 MG tablet  Insurance Company: [employer] - Phone [phone] Fax [phone]  Pharmacy Filling the Rx: [facility] - [address]  Filling Pharmacy Phone: [phone]  Filling Pharmacy Fax:    Start Date: 8/23/2024

## 2024-08-26 NOTE — TELEPHONE ENCOUNTER
Prior Authorization Approval    Authorization Effective Date: 5/25/2024  Authorization Expiration Date: 8/23/2025  Medication: risperiDONE (RISPERDAL) 0.5 MG tablet  Reference #:     Insurance Company: [employer] - Phone [phone] Fax [phone]  Which Pharmacy is filling the prescription (Not needed for infusion/clinic administered): [facility] - [address]  Pharmacy Notified: Yes  Patient Notified: Instructed pharmacy to notify patient when script is ready to /ship.

## 2024-08-31 DIAGNOSIS — F33.41 RECURRENT MAJOR DEPRESSIVE DISORDER, IN PARTIAL REMISSION (H): ICD-10-CM

## 2024-09-03 RX ORDER — DULOXETIN HYDROCHLORIDE 60 MG/1
CAPSULE, DELAYED RELEASE ORAL
Qty: 90 CAPSULE | Refills: 97 | Status: SHIPPED | OUTPATIENT
Start: 2024-09-03

## 2024-09-03 RX ORDER — DULOXETIN HYDROCHLORIDE 30 MG/1
CAPSULE, DELAYED RELEASE ORAL
Qty: 90 CAPSULE | Refills: 97 | Status: SHIPPED | OUTPATIENT
Start: 2024-09-03

## 2024-09-25 ENCOUNTER — ASSISTED LIVING VISIT (OUTPATIENT)
Dept: GERIATRICS | Facility: CLINIC | Age: 84
End: 2024-09-25
Payer: COMMERCIAL

## 2024-09-25 VITALS
OXYGEN SATURATION: 94 % | TEMPERATURE: 97.3 F | DIASTOLIC BLOOD PRESSURE: 85 MMHG | HEART RATE: 91 BPM | BODY MASS INDEX: 22.78 KG/M2 | HEIGHT: 60 IN | SYSTOLIC BLOOD PRESSURE: 124 MMHG | RESPIRATION RATE: 16 BRPM | WEIGHT: 116 LBS

## 2024-09-25 DIAGNOSIS — K21.9 GASTROESOPHAGEAL REFLUX DISEASE, UNSPECIFIED WHETHER ESOPHAGITIS PRESENT: ICD-10-CM

## 2024-09-25 DIAGNOSIS — N18.31 STAGE 3A CHRONIC KIDNEY DISEASE (H): ICD-10-CM

## 2024-09-25 DIAGNOSIS — F41.9 ANXIETY: ICD-10-CM

## 2024-09-25 DIAGNOSIS — R44.3 HALLUCINATIONS: ICD-10-CM

## 2024-09-25 DIAGNOSIS — F02.B2 MODERATE LATE ONSET ALZHEIMER'S DEMENTIA WITH PSYCHOTIC DISTURBANCE (H): Primary | ICD-10-CM

## 2024-09-25 DIAGNOSIS — I25.10 CORONARY ARTERY DISEASE INVOLVING NATIVE HEART WITHOUT ANGINA PECTORIS, UNSPECIFIED VESSEL OR LESION TYPE: ICD-10-CM

## 2024-09-25 DIAGNOSIS — F33.41 RECURRENT MAJOR DEPRESSIVE DISORDER, IN PARTIAL REMISSION (H): ICD-10-CM

## 2024-09-25 DIAGNOSIS — G30.1 MODERATE LATE ONSET ALZHEIMER'S DEMENTIA WITH PSYCHOTIC DISTURBANCE (H): Primary | ICD-10-CM

## 2024-09-25 PROCEDURE — 99349 HOME/RES VST EST MOD MDM 40: CPT | Performed by: INTERNAL MEDICINE

## 2024-09-25 NOTE — LETTER
9/25/2024      Elzbieta Ivan  [address]  [place] [address]        No notes on file      Sincerely,        Julia Worley MD

## 2024-09-30 ENCOUNTER — TELEPHONE (OUTPATIENT)
Dept: GERIATRICS | Facility: CLINIC | Age: 84
End: 2024-09-30
Payer: COMMERCIAL

## 2024-09-30 DIAGNOSIS — R44.3 HALLUCINATIONS: ICD-10-CM

## 2024-09-30 DIAGNOSIS — F22 DELUSIONS (H): ICD-10-CM

## 2024-09-30 DIAGNOSIS — G30.1 MODERATE LATE ONSET ALZHEIMER'S DEMENTIA WITH PSYCHOTIC DISTURBANCE (H): Primary | ICD-10-CM

## 2024-09-30 DIAGNOSIS — F02.B2 MODERATE LATE ONSET ALZHEIMER'S DEMENTIA WITH PSYCHOTIC DISTURBANCE (H): Primary | ICD-10-CM

## 2024-09-30 RX ORDER — RISPERIDONE 0.5 MG/1
0.5 TABLET, ORALLY DISINTEGRATING ORAL DAILY
Qty: 60 TABLET | Refills: 11 | Status: SHIPPED | OUTPATIENT
Start: 2024-09-30

## 2024-09-30 RX ORDER — RISPERIDONE 0.5 MG/1
0.5 TABLET, ORALLY DISINTEGRATING ORAL 2 TIMES DAILY
Qty: 90 TABLET | Refills: 11 | Status: SHIPPED | OUTPATIENT
Start: 2024-09-30 | End: 2024-09-30

## 2024-09-30 NOTE — TELEPHONE ENCOUNTER
Elzbieta Ivan   1940    Orders 2024:  DISCONTINUE current risperidone orders, scheduled and prn  Start risperidone ODT 0.5 mg po daily  and 0.5 mg po daily prn dementia with delusions and hallucinations  Sent to pharmacy    ENID Taylor CNP on 2024 at 12:59 PM

## 2024-09-30 NOTE — TELEPHONE ENCOUNTER
"Received a message from a nurse at [facility] reporting patient is consistently refusing to take risperidone, won't take the \"orange pill.\" Pharmacy recommends ODT form, which is white.   Patient was previously on seroquel, but also consistently refused the \"pink pill.\"   Order sent to pharmacy and ENID Mcdonald CNP on 9/30/2024 at 12:54 PM    "

## 2024-10-12 NOTE — PROGRESS NOTES
Elzbieta Ivan is a 83 year old female seen September 25, 2024 at [facility] unit where she has resided for 2 years (admit to [place] 6/2022, moved to Memory Care 12/2022) seen to follow up dementia with psychosis and anxious features.   Pt is seen in her apartment, ambulating without device.   Reports some pain in her left hip when standing or walking.    Gives a tangential and vague history   Nursing staff reports pt is now coming out to the dining room for meals.   Pt reports she feels better with the white pill (risperidone) and has refused the pink pill (quetiapine)   Nursing staff reports pt sees people in her room at night, talking while she is in bed.  Has been more annoyed than frightened by these hallucinations.        By chart review, pt has a CAD, Hashimoto's thyroiditis, tremor, osteoporosis and GERD.   In November 2022 she had an inpatient Psychiatric stay at [facility] unit for suicidal ideation with a plan.   Medications were adjusted and she moved to Memory Care at that time.     Pt underwent Neuropsychiatric testing in March 2023, and that showed poor memory, decreased executive function, verbal processing difficulties, all c/w dx of Alzheimer's dementia   She was started on rivastigmine.     She has had delusions and hallucinations necessitating neuroleptic treatment.   Sometimes she refuses her meds, and is worse for a while      Pt was seen in the [facility] ED in July 2024 following an episode of presyncope.   Reported feeling lightheaded and feeling hot, but did not pass out.  Thought to be a vasovagal event, and no changes made.   Returned to [place].     Past Medical History:   Diagnosis Date    Anemia     Anxiety     Arrhythmia     hx of SVT    Arthritis     CAD (coronary artery disease)     Cardiomegaly     Cervical cancer (H) 1960    Dementia (H)     Dysphagia     Essential tremor     Fibromyalgia     Gastroesophageal reflux disease     GERD (gastroesophageal reflux disease)      Goiter, nodular     Hashimoto's disease     Hyperlipidemia     LVH (left ventricular hypertrophy)     Major depression     Malignant neoplasm of cervix (H)     Migraine     Non-toxic nodular goiter     Osteoporosis     Polyp of vagina     Proximal humerus fracture 2018    left    Pulmonary nodules     Sleep apnea     intolerant of CPAP    Suicidal ideation 2022    SVT (supraventricular tachycardia) (H) 2017    resolved    Synovial cyst     lumbar spine       Past Surgical History:   Procedure Laterality Date    APPENDECTOMY       SECTION      EYE SURGERY      Bilat blephplsty    GYN SURGERY      hysterectomy with single oophrectomy    LAMINECTOMY LUMBAR ONE LEVEL Bilateral 2020    Procedure: LUMBAR 5 LAMINECTOMY BILATERALLY FOR EXCISION OF A LARGE SYNOVIAL CYST;  Surgeon: Antwan Burkett MD;  Location: [facility] OR    LIPOSUCTION (LOCATION)      LUMBAR LAMINECTOMY  2017    ORTHOPEDIC SURGERY Left     knee arthroscopy    PUNCTURE ASPIRATION ABSCESS/HEMATOMA/CYST      REVERSE ARTHROPLASTY SHOULDER Left 2018    Procedure: REVERSE ARTHROPLASTY SHOULDER;  LEFT REVERSE TOTAL SHOULDER ARTHROPLASTY;  Surgeon: Con Tucker MD;  Location: [facility] OR    SYNOVIAL CYST EXCISION      TONSILLECTOMY       SH: Single, lives with her little dog Niya in [place]   Her sister Bacilio  from pancreatic cancer    Brother-in-law Jayce is now POA     Pt has a long time friend Wisam who visits and takes her for walks   Has also been friends with the [facility] for 30 years   Pt is a retired Psychologist, had a clinic in [facility]   Past smoker >40 pack year history      ROS:  ambulatory without device   Wt Readings from Last 5 Encounters:   24 52.6 kg (116 lb)   24 54.6 kg (120 lb 6.4 oz)   24 55.5 kg (122 lb 6.4 oz)   24 55.5 kg (122 lb 6.4 oz)   05/15/24 56.2 kg (124 lb)      EXAM: NAD  /85   Pulse 91   Temp 97.3  F (36.3  C)   Resp 16   Ht 1.524 m (5')    Wt 52.6 kg (116 lb)   SpO2 94%   BMI 22.65 kg/m     Neck supple without adenopathy  Lungs clear bilaterally with fair air movement  Heart RRR s1s2   Abd soft, NT, no distention or guarding, +BS   Ext with trace edema  Neuro: rises easily and quickly from her chair, ambulates without device and no limp  Repetitive, disjointed history   Psych: affect okay, pleasant     Lab Results   Component Value Date     07/16/2024    POTASSIUM 3.6 07/16/2024    CHLORIDE 106 07/16/2024    CO2 26 07/16/2024    ANIONGAP 10 07/16/2024    GLC 91 07/16/2024    BUN 16.9 07/16/2024    CR 1.08 (H) 07/16/2024    GFRESTIMATED 51 (L) 07/16/2024    KALANI 8.9 07/16/2024     Lab Results   Component Value Date    WBC 9.5 07/03/2024      HGB 11.2 07/16/2024      MCV 92 07/03/2024       07/03/2024       Brain MRI 9/2022:    FINDINGS: Increased now mild to moderate cerebral and cerebellar volume loss. Increased mild to moderate chronic small vessel ischemic foci within the deep and subcortical white matter of the cerebral hemispheres bilaterally. No evidence for intracranial mass, mass effect, abnormal enhancement, acute infarction or flow void  abnormality. Paranasal sinuses, mastoid air cell regions, sellar region and craniocervical junction otherwise unremarkable.   IMPRESSION:   1. No evidence for acute infarction, abnormal intracranial enhancement or intracranial mass.   2. Increased mild to moderate cerebral and cerebellar volume loss.   3. Increased mild to moderate chronic small vessel ischemic changes as above.      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 (H)  Comment: persistent hallucinations and delusions   Mobility and language preserved     Plan: [place] Memory Care unit for assist with med admin, meals, activity and secure unit    Rivastigmine 3 mg/ bid   Monitor for GI or cardiac side effects   Risperidone 0.25 mg bid     (F41.9)  Anxiety  (F33.41) Recurrent major depressive disorder, in partial remission (H)  Comment: with h/o suicidal ideation     Plan: duloxetine 90 mg/day, mirtazapine 15 mg/HS and buspirone 7.5 mg bid    Follow up with Psychiatry Dr Daya Sosa     (I25.10) Coronary artery disease involving native heart without angina pectoris, unspecified vessel or lesion type  Comment: by imaging, no h/o event or symptoms   Plan: aspirin 81 mg/day and simvastatin 40 mg/day for secondary prevention     (M81.0) Age-related osteoporosis without current pathological fracture  Comment: DEXA in 2021 showed progression of osteoporosis despite alendronate tx  Received Reclast in 2021     Plan: vit D 25 mcg/day, dietary calcium   Consider further bisphosphonate or other tx     (N18.31) Stage 3a chronic kidney disease (H)  Comment: GFR 51-58   Plan: unclear etiology, BPs okay   Follow BMP       (K21.9) Gastroesophageal reflux disease, unspecified whether esophagitis present  Comment: by history   Plan: pantoprazole 40 mg/day      Advance directive: DNR/DNI per signed JESICA Worley MD

## 2024-10-23 ENCOUNTER — ASSISTED LIVING VISIT (OUTPATIENT)
Dept: GERIATRICS | Facility: CLINIC | Age: 84
End: 2024-10-23
Payer: COMMERCIAL

## 2024-10-23 VITALS
WEIGHT: 121 LBS | HEART RATE: 98 BPM | SYSTOLIC BLOOD PRESSURE: 115 MMHG | DIASTOLIC BLOOD PRESSURE: 96 MMHG | RESPIRATION RATE: 16 BRPM | BODY MASS INDEX: 23.63 KG/M2 | TEMPERATURE: 98 F

## 2024-10-23 DIAGNOSIS — R44.3 HALLUCINATIONS: ICD-10-CM

## 2024-10-23 DIAGNOSIS — F02.B2 MODERATE LATE ONSET ALZHEIMER'S DEMENTIA WITH PSYCHOTIC DISTURBANCE (H): Primary | ICD-10-CM

## 2024-10-23 DIAGNOSIS — G30.1 MODERATE LATE ONSET ALZHEIMER'S DEMENTIA WITH PSYCHOTIC DISTURBANCE (H): Primary | ICD-10-CM

## 2024-10-23 DIAGNOSIS — F41.9 ANXIETY: ICD-10-CM

## 2024-10-23 DIAGNOSIS — F33.41 RECURRENT MAJOR DEPRESSIVE DISORDER, IN PARTIAL REMISSION (H): ICD-10-CM

## 2024-10-23 DIAGNOSIS — F22 DELUSIONS (H): ICD-10-CM

## 2024-10-23 DIAGNOSIS — I25.10 CORONARY ARTERY DISEASE INVOLVING NATIVE HEART WITHOUT ANGINA PECTORIS, UNSPECIFIED VESSEL OR LESION TYPE: ICD-10-CM

## 2024-10-23 PROCEDURE — 99349 HOME/RES VST EST MOD MDM 40: CPT | Performed by: NURSE PRACTITIONER

## 2024-10-23 RX ORDER — RISPERIDONE 0.5 MG/1
0.5 TABLET, ORALLY DISINTEGRATING ORAL 2 TIMES DAILY
Qty: 60 TABLET | Refills: 11 | Status: SHIPPED | OUTPATIENT
Start: 2024-10-23

## 2024-10-23 NOTE — PROGRESS NOTES
"[facility] GERIATRICS    Chief Complaint   Patient presents with    RECHECK     HPI:  Elzbieta Ivan is a 83 year old  (1940), who is being seen today for an episodic care visit at: [facility] ([facility]) [[id number]].   She has lived at this facility since 6/2022, initially in AL then moved to Memory Care 12/2022. We assumed primary care 5/2023.   Medical history significant for dementia, major depression, anxiety, suicidal ideation, anemia, GERD, CAD, hyperlipidemia, Hashimoto's,  essential tremor, fibromyalgia, osteoarthritis, osteoporosis.   She was hospitalized on the [facility] 11/28-12/6/2022 with suicidal ideation and a plan. Cymbalta was decreased from 120 mg daily to 90 mg due to potential side effects of mood instability and irritability. Xanax was dc'd. Mirtazapine and prn seroquel were started. She returned to the facility and moved to Memory Care for a higher level of care.   She was seen by Dr Giang at [facility] 3/7/2023 and rivastigmine was started. Note indicates that Neuropsych testing showed memory markedly affected,  executive abilities moderately affected, verbal processing mild to moderately affected, nonverbal processing minimally affected. Findings were consistent with Alzheimer's disease.     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 She again talks about the facility  meeting with people in her room at night, going through her things and drinking her pop. States that she is still getting the \"little pink pill\" to \"enforce sleep.\" Conversation is disorganized and she is teary at times. She has a printed photo of people with their " "names written on the photo, but doesn't recognize any of them. She thinks they are relatives, and also coming in her apartment at night. She's pacing about the apartment and her little dog is barking and jumping at both of us.   The \"little pink pill\" is seroquel, which was dc'd 8/21/2024. Risperidone was started, but she also refused the \"orange pill.\" Risperidone ODT, a white pill, was started 9/30/2024 and she has been taking it. The prn dose has not been used.   Spoke with her brother in law/POA, Amandeep Velazquez, who reports her mood is up and down, but the delusions about facility staff are ongoing.     Allergies, and PMH/PSH reviewed in EPIC today    REVIEW OF SYSTEMS:  Limited due to dementia. Positives as noted under HPI    Objective:   BP (!) 115/96   Pulse 98   Temp 98  F (36.7  C)   Resp 16   Wt 54.9 kg (121 lb)   BMI 23.63 kg/m    GENERAL APPEARANCE:  Alert, in no acute distress, anxious   ENT:  [place]  EYES:  conjunctiva and lids normal  RESP:  lungs clear to auscultation   CV:  regular rate and rhythm, no murmur, no edema  ABDOMEN:  soft, non-tender, no distension  M/S:  gait steady. EVANGELISTA with good strength. No joint inflammation   SKIN:  no rashes or open areas  PSYCH:  oriented to self, place, situation, insight and judgement impaired, memory impaired     Recent labs in [facility] reviewed by me today.       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 Ongoing hallucinations and delusions, primarily focused on people coming in her room at night.   No s/s of acute illness.   Plan: increase risperidone ODT to 0.5 mg bid and discontinue the prn order. Continue duloxetine, buspar, mirtazapine.Memory Care staff assistance with cares, meals, activities, med admin  POA agrees with plan of care  Discussed with staff. "     (F33.41) Recurrent major depressive disorder, in partial remission (H)  (F41.9) Anxiety  Comment: predates cognitive decline   Mirtazapine was increased 5/2024 and appeared to help with anxiety   Plan: meds as above     (I25.10) Coronary artery disease involving native heart without angina pectoris, unspecified vessel or lesion type  Comment: asymptomatic  Extensive abdominal aortic calcification noted on DEXA 2020. No history of cardiac event or CVA   Plan: continue ASA, statin. Due for lipid panel, but will hold off on venipuncture until agitation and delusions are more stable.           Electronically signed by: ENID Taylor CNP

## 2024-10-23 NOTE — LETTER
" 10/23/2024      Elzbieta Ivan  [address]  [place] [address]                  [facility] GERIATRICS    Chief Complaint   Patient presents with     RECHECK     HPI:  Elzbieta Ivan is a 83 year old  (1940), who is being seen today for an episodic care visit at: [facility]) [269].   She has lived at this facility since 6/2022, initially in AL then moved to Memory Care 12/2022. We assumed primary care 5/2023.   Medical history significant for dementia, major depression, anxiety, suicidal ideation, anemia, GERD, CAD, hyperlipidemia, Hashimoto's,  essential tremor, fibromyalgia, osteoarthritis, osteoporosis.   She was hospitalized on the [facility] 11/28-12/6/2022 with suicidal ideation and a plan. Cymbalta was decreased from 120 mg daily to 90 mg due to potential side effects of mood instability and irritability. Xanax was dc'd. Mirtazapine and prn seroquel were started. She returned to the facility and moved to Memory Care for a higher level of care.   She was seen by Dr Giang at [facility] 3/7/2023 and rivastigmine was started. Note indicates that Neuropsych testing showed memory markedly affected,  executive abilities moderately affected, verbal processing mild to moderately affected, nonverbal processing minimally affected. Findings were consistent with Alzheimer's disease.     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 She again talks about the facility  meeting with people in her room at night, going through her things and drinking her pop. States that she is still getting the \"little pink pill\" to \"enforce sleep.\" " "Conversation is disorganized and she is teary at times. She has a printed photo of people with their names written on the photo, but doesn't recognize any of them. She thinks they are relatives, and also coming in her apartment at night. She's pacing about the apartment and her little dog is barking and jumping at both of us.   The \"little pink pill\" is seroquel, which was dc'd 8/21/2024. Risperidone was started, but she also refused the \"orange pill.\" Risperidone ODT, a white pill, was started 9/30/2024 and she has been taking it. The prn dose has not been used.   Spoke with her brother in law/POA, Amandeep Velazquez, who reports her mood is up and down, but the delusions about facility staff are ongoing.     Allergies, and PMH/PSH reviewed in EPIC today    REVIEW OF SYSTEMS:  Limited due to dementia. Positives as noted under HPI    Objective:   BP (!) 115/96   Pulse 98   Temp 98  F (36.7  C)   Resp 16   Wt 54.9 kg (121 lb)   BMI 23.63 kg/m    GENERAL APPEARANCE:  Alert, in no acute distress, anxious   ENT:  [place]  EYES:  conjunctiva and lids normal  RESP:  lungs clear to auscultation   CV:  regular rate and rhythm, no murmur, no edema  ABDOMEN:  soft, non-tender, no distension  M/S:  gait steady. EVANGELISTA with good strength. No joint inflammation   SKIN:  no rashes or open areas  PSYCH:  oriented to self, place, situation, insight and judgement impaired, memory impaired     Recent labs in [facility] reviewed by me today.       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 Ongoing hallucinations and delusions, primarily focused on people coming in her room at night.   No s/s of acute illness.   Plan: increase risperidone ODT to 0.5 mg bid and discontinue the prn order. Continue duloxetine, buspar, mirtazapine.Memory Care staff assistance " with cares, meals, activities, med admin  POA agrees with plan of care  Discussed with staff.     (F33.41) Recurrent major depressive disorder, in partial remission (H)  (F41.9) Anxiety  Comment: predates cognitive decline   Mirtazapine was increased 5/2024 and appeared to help with anxiety   Plan: meds as above     (I25.10) Coronary artery disease involving native heart without angina pectoris, unspecified vessel or lesion type  Comment: asymptomatic  Extensive abdominal aortic calcification noted on DEXA 2020. No history of cardiac event or CVA   Plan: continue ASA, statin. Due for lipid panel, but will hold off on venipuncture until agitation and delusions are more stable.           Electronically signed by: ENID Taylor CNP               Sincerely,        ENID Taylor CNP

## 2024-11-06 ENCOUNTER — ASSISTED LIVING VISIT (OUTPATIENT)
Dept: GERIATRICS | Facility: CLINIC | Age: 84
End: 2024-11-06
Payer: COMMERCIAL

## 2024-11-06 VITALS
HEART RATE: 87 BPM | SYSTOLIC BLOOD PRESSURE: 142 MMHG | RESPIRATION RATE: 16 BRPM | TEMPERATURE: 98.7 F | WEIGHT: 121 LBS | BODY MASS INDEX: 23.63 KG/M2 | DIASTOLIC BLOOD PRESSURE: 84 MMHG

## 2024-11-06 DIAGNOSIS — F02.B2 MODERATE LATE ONSET ALZHEIMER'S DEMENTIA WITH PSYCHOTIC DISTURBANCE (H): Primary | ICD-10-CM

## 2024-11-06 DIAGNOSIS — G30.1 MODERATE LATE ONSET ALZHEIMER'S DEMENTIA WITH PSYCHOTIC DISTURBANCE (H): Primary | ICD-10-CM

## 2024-11-06 DIAGNOSIS — F33.41 RECURRENT MAJOR DEPRESSIVE DISORDER, IN PARTIAL REMISSION (H): ICD-10-CM

## 2024-11-06 DIAGNOSIS — F41.9 ANXIETY: ICD-10-CM

## 2024-11-06 DIAGNOSIS — R44.3 HALLUCINATIONS: ICD-10-CM

## 2024-11-06 DIAGNOSIS — F22 DELUSIONS (H): ICD-10-CM

## 2024-11-06 PROCEDURE — 99349 HOME/RES VST EST MOD MDM 40: CPT | Performed by: NURSE PRACTITIONER

## 2024-11-06 NOTE — PROGRESS NOTES
"[facility] GERIATRICS    Chief Complaint   Patient presents with    RECHECK     HPI:  Elzbieta Ivan is a 83 year old  (1940), who is being seen today for an episodic care visit at: [facility] [269].   She has lived at this facility since 6/2022, initially in AL then moved to Memory Care 12/2022. We assumed primary care 5/2023.   Medical history significant for dementia, major depression, anxiety, suicidal ideation, anemia, GERD, CAD, hyperlipidemia, Hashimoto's,  essential tremor, fibromyalgia, osteoarthritis, osteoporosis.   She was hospitalized on the [facility] 11/28-12/6/2022 with suicidal ideation and a plan. Cymbalta was decreased from 120 mg daily to 90 mg due to potential side effects of mood instability and irritability. Xanax was dc'd. Mirtazapine and prn seroquel were started. She returned to the facility and moved to Memory Care for a higher level of care.   She was seen by Dr Giang at [facility] 3/7/2023 and rivastigmine was started. Note indicates that Neuropsych testing showed memory markedly affected,  executive abilities moderately affected, verbal processing mild to moderately affected, nonverbal processing minimally affected. Findings were consistent with Alzheimer's disease.     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 She is pleasant and in a good mood. Reports feeling well, \"now that they finally stopped the little pink pill.\" States she's sleeping well at night. Good appetite and has been going to the dining room at times, which is new. Conversation is mostly appropriate. Ambulates without a device and independent with cares.   The \"little pink pill\" is " "seroquel, which was dc'd 8/21/2024. Risperidone was started, but she also refused the \"orange pill.\" Risperidone ODT, a white pill, was started 9/30/2024.      Allergies, and PMH/PSH reviewed in EPIC today    REVIEW OF SYSTEMS:  Limited due to dementia. Positives as noted under HPI    Objective:   BP (!) 142/84   Pulse 87   Temp 98.7  F (37.1  C)   Resp 16   Wt 54.9 kg (121 lb)   BMI 23.63 kg/m    GENERAL APPEARANCE:  Alert, in no acute distress   ENT:  [place]  EYES:  conjunctiva and lids normal  RESP:  no respiratory distress   CV:  no edema  M/S:  gait steady. EVANGELISTA with good strength. No joint inflammation   SKIN:  no visible rashes or open areas   PSYCH:  oriented to self, place, situation, insight and judgement impaired, memory impaired, mood normal     Recent labs in [facility] reviewed by me today.       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 Mood and agitation much improved   Plan: continue risperidone ODT 0.5 mg bid, duloxetine 90 mg daily, buspirone 7.5 mg bid, mirtazapine 15 mg HS, rivastigmine 3 mg bid.  Memory Care staff assistance with cares, meals, activities, med admin  Discussed with staff         Electronically signed by: ENID Taylor CNP             "

## 2024-11-06 NOTE — LETTER
" 11/6/2024      Elzbieta Ivan  [address]  [place] [address]          [facility] GERIATRICS    Chief Complaint   Patient presents with     RECHECK     HPI:  Elzbieta Ivan is a 83 year old  (1940), who is being seen today for an episodic care visit at: [facility]) [269].   She has lived at this facility since 6/2022, initially in AL then moved to Memory Care 12/2022. We assumed primary care 5/2023.   Medical history significant for dementia, major depression, anxiety, suicidal ideation, anemia, GERD, CAD, hyperlipidemia, Hashimoto's,  essential tremor, fibromyalgia, osteoarthritis, osteoporosis.   She was hospitalized on the [facility] Unit 11/28-12/6/2022 with suicidal ideation and a plan. Cymbalta was decreased from 120 mg daily to 90 mg due to potential side effects of mood instability and irritability. Xanax was dc'd. Mirtazapine and prn seroquel were started. She returned to the facility and moved to Memory Care for a higher level of care.   She was seen by Dr Giang at [facility] 3/7/2023 and rivastigmine was started. Note indicates that Neuropsych testing showed memory markedly affected,  executive abilities moderately affected, verbal processing mild to moderately affected, nonverbal processing minimally affected. Findings were consistent with Alzheimer's disease.     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 She is pleasant and in a good mood. Reports feeling well, \"now that they finally stopped the little pink pill.\" States she's sleeping well at night. Good appetite and has been going to the dining room at times, which is new. Conversation is mostly " "appropriate. Ambulates without a device and independent with cares.   The \"little pink pill\" is seroquel, which was dc'd 8/21/2024. Risperidone was started, but she also refused the \"orange pill.\" Risperidone ODT, a white pill, was started 9/30/2024.      Allergies, and PMH/PSH reviewed in EPIC today    REVIEW OF SYSTEMS:  Limited due to dementia. Positives as noted under HPI    Objective:   BP (!) 142/84   Pulse 87   Temp 98.7  F (37.1  C)   Resp 16   Wt 54.9 kg (121 lb)   BMI 23.63 kg/m    GENERAL APPEARANCE:  Alert, in no acute distress   ENT:  [place]  EYES:  conjunctiva and lids normal  RESP:  no respiratory distress   CV:  no edema  M/S:  gait steady. EVANGELISTA with good strength. No joint inflammation   SKIN:  no visible rashes or open areas   PSYCH:  oriented to self, place, situation, insight and judgement impaired, memory impaired, mood normal     Recent labs in [facility] reviewed by me today.       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 Mood and agitation much improved   Plan: continue risperidone ODT 0.5 mg bid, duloxetine 90 mg daily, buspirone 7.5 mg bid, mirtazapine 15 mg HS, rivastigmine 3 mg bid.  Memory Care staff assistance with cares, meals, activities, med admin  Discussed with staff         Electronically signed by: ENID Taylor CNP                 Sincerely,        ENID Taylor CNP      "

## 2024-12-10 DIAGNOSIS — K59.00 CONSTIPATION: Primary | ICD-10-CM

## 2024-12-11 RX ORDER — SENNOSIDES 8.6 MG/1
TABLET, COATED ORAL
Qty: 60 TABLET | Refills: 97 | Status: SHIPPED | OUTPATIENT
Start: 2024-12-11

## 2025-01-08 ENCOUNTER — ASSISTED LIVING VISIT (OUTPATIENT)
Dept: GERIATRICS | Facility: CLINIC | Age: 85
End: 2025-01-08
Payer: COMMERCIAL

## 2025-01-08 VITALS
SYSTOLIC BLOOD PRESSURE: 150 MMHG | DIASTOLIC BLOOD PRESSURE: 88 MMHG | WEIGHT: 116.4 LBS | TEMPERATURE: 97.9 F | HEART RATE: 93 BPM | BODY MASS INDEX: 22.73 KG/M2 | RESPIRATION RATE: 18 BRPM

## 2025-01-08 DIAGNOSIS — N18.31 STAGE 3A CHRONIC KIDNEY DISEASE (H): ICD-10-CM

## 2025-01-08 DIAGNOSIS — G30.1 MODERATE LATE ONSET ALZHEIMER'S DEMENTIA WITH PSYCHOTIC DISTURBANCE (H): Primary | ICD-10-CM

## 2025-01-08 DIAGNOSIS — F02.B2 MODERATE LATE ONSET ALZHEIMER'S DEMENTIA WITH PSYCHOTIC DISTURBANCE (H): Primary | ICD-10-CM

## 2025-01-08 DIAGNOSIS — R44.3 HALLUCINATIONS: ICD-10-CM

## 2025-01-08 DIAGNOSIS — F33.41 RECURRENT MAJOR DEPRESSIVE DISORDER, IN PARTIAL REMISSION: ICD-10-CM

## 2025-01-08 DIAGNOSIS — F41.9 ANXIETY: ICD-10-CM

## 2025-01-08 DIAGNOSIS — F22 DELUSIONS (H): ICD-10-CM

## 2025-01-08 DIAGNOSIS — D64.9 ANEMIA, UNSPECIFIED TYPE: ICD-10-CM

## 2025-01-08 DIAGNOSIS — I25.10 CORONARY ARTERY DISEASE INVOLVING NATIVE HEART WITHOUT ANGINA PECTORIS, UNSPECIFIED VESSEL OR LESION TYPE: ICD-10-CM

## 2025-01-08 DIAGNOSIS — K21.9 GASTROESOPHAGEAL REFLUX DISEASE, UNSPECIFIED WHETHER ESOPHAGITIS PRESENT: ICD-10-CM

## 2025-01-08 NOTE — LETTER
1/8/2025      Elzbieta Ivan  [address]  [place] [address]          [facility] GERIATRICS    Chief Complaint   Patient presents with     RECHECK     HPI:  Elzbieta Ivan is a 83 year old  (1940), who is being seen today for an episodic care visit at: [facility]) [269].   She has lived at this facility since 6/2022, initially in AL then moved to Memory Care 12/2022. We assumed primary care 5/2023.   Medical history significant for dementia, major depression, anxiety, suicidal ideation, anemia, GERD, CAD, hyperlipidemia, Hashimoto's,  essential tremor, fibromyalgia, osteoarthritis, osteoporosis.   She was hospitalized on the [facility] 11/28-12/6/2022 with suicidal ideation and a plan. Cymbalta was decreased from 120 mg daily to 90 mg due to potential side effects of mood instability and irritability. Xanax was dc'd. Mirtazapine and prn seroquel were started. She returned to the facility and moved to Memory Care for a higher level of care.   She was seen by Dr Giang at [facility] 3/7/2023 and rivastigmine was started. Neuropsych testing showed memory markedly affected,  executive abilities moderately affected, verbal processing mild to moderately affected, nonverbal processing minimally affected. Findings were consistent with Alzheimer's disease.     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 Risperidone was increased 10/2024 for delusions, hallucinations and agitation.   She is quiet and  "subdued,  not as talkative as usual. Denies feeling ill. Responds \"I don't know\" to many questions. She's not sure if she had breakfast and thinks she's wearing the same clothes she wore yesterday. \"Maybe I slept on the couch, I don't know.\" Ambulates without a device. Independent with cares. Staff reports she's been coming out for more activities and consistently comes to the dining room for lunch and dinner.   Spoke with her POA Amandeep Velazquez, who reports they had to make the difficult decision to put her dog down. She was involved in the decision and handled the situation well. Understandably, she has been sad.       Allergies, and PMH/PSH reviewed in EPIC today    REVIEW OF SYSTEMS:  Limited due to dementia. Positives as noted under HPI    Objective:   BP (!) 150/88   Pulse 93   Temp 97.9  F (36.6  C)   Resp 18   Wt 52.8 kg (116 lb 6.4 oz)   BMI 22.73 kg/m    GENERAL APPEARANCE:  Alert, in no acute distress   ENT:  [place]  EYES:  conjunctiva and lids normal  RESP:  lungs clear bilaterally   CV:  regular rate and rhythm, no murmur, no edema   M/S:  gait steady. EVANGELISTA with good strength. Mild twitching of UE  SKIN:  no visible rashes or open areas   PSYCH:  oriented to self, place, situation, insight and judgement impaired, memory impaired, flat affect     Recent labs in [facility] reviewed by me today.       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 Twitching of her UE is likely an extrapyramidal side effect of risperidone and she appears slightly medicated today. She has been compliant with meds.   Plan: hold am dose of risperidone for 5 days, then staff to update re: delusions and behaviors. Continue risperidone ODT 0.5 mg HS, rivastigmine 3 mg bid, duloxetine, buspirone. Memory Care staff assistance with cares, meals, activities, med admin. Recheck " next week.   POA agrees with plan of care.   Discussed with staff.     (F33.41) Recurrent major depressive disorder, in partial remission  (F41.9) Anxiety  Comment: long standing and predates dementia.   Plan: continue buspirone, duloxetine, mirtazapine. Monitor symptoms. Encourage activities and socialization.     (N18.31) Stage 3a chronic kidney disease (H)  Comment: creatinine 1.08 on 7/16/2024, baseline   Plan: BMP. Avoid nephrotoxins, renal dose meds     (D64.9) Anemia, unspecified type  Comment: Hgb 11.2 on 7/16/2024, stable   Plan: CBC    (I25.10) Coronary artery disease involving native heart without angina pectoris, unspecified vessel or lesion type  Comment: no chest pain or acute symptoms   Extensive abdominal aortic calcification noted on DEXA 2020. No history of cardiac event or CVA   Plan: continue ASA, statin.     (K21.9) Gastroesophageal reflux disease, unspecified whether esophagitis present  Comment: symptoms managed.   Plan: consider decreasing pantoprazole at next visit           Electronically signed by: ENID Taylor CNP               Sincerely,        ENID Taylor CNP    Electronically signed

## 2025-01-08 NOTE — PROGRESS NOTES
"[facility] GERIATRICS    Chief Complaint   Patient presents with    RECHECK     HPI:  Elzbieta Ivan is a 83 year old  (1940), who is being seen today for an episodic care visit at: [facility] ([facility]) [[id number]].   She has lived at this facility since 6/2022, initially in AL then moved to Memory Care 12/2022. We assumed primary care 5/2023.   Medical history significant for dementia, major depression, anxiety, suicidal ideation, anemia, GERD, CAD, hyperlipidemia, Hashimoto's,  essential tremor, fibromyalgia, osteoarthritis, osteoporosis.   She was hospitalized on the [facility] 11/28-12/6/2022 with suicidal ideation and a plan. Cymbalta was decreased from 120 mg daily to 90 mg due to potential side effects of mood instability and irritability. Xanax was dc'd. Mirtazapine and prn seroquel were started. She returned to the facility and moved to Memory Care for a higher level of care.   She was seen by Dr Giang at [facility] 3/7/2023 and rivastigmine was started. Neuropsych testing showed memory markedly affected,  executive abilities moderately affected, verbal processing mild to moderately affected, nonverbal processing minimally affected. Findings were consistent with Alzheimer's disease.     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 Risperidone was increased 10/2024 for delusions, hallucinations and agitation.   She is quiet and subdued,  not as talkative as usual. Denies feeling ill. Responds \"I don't know\" to many " "questions. She's not sure if she had breakfast and thinks she's wearing the same clothes she wore yesterday. \"Maybe I slept on the couch, I don't know.\" Ambulates without a device. Independent with cares. Staff reports she's been coming out for more activities and consistently comes to the dining room for lunch and dinner.   Spoke with her POA Amandeep Velazquez, who reports they had to make the difficult decision to put her dog down. She was involved in the decision and handled the situation well. Understandably, she has been sad.       Allergies, and PMH/PSH reviewed in EPIC today    REVIEW OF SYSTEMS:  Limited due to dementia. Positives as noted under HPI    Objective:   BP (!) 150/88   Pulse 93   Temp 97.9  F (36.6  C)   Resp 18   Wt 52.8 kg (116 lb 6.4 oz)   BMI 22.73 kg/m    GENERAL APPEARANCE:  Alert, in no acute distress   ENT:  [place]  EYES:  conjunctiva and lids normal  RESP:  lungs clear bilaterally   CV:  regular rate and rhythm, no murmur, no edema   M/S:  gait steady. EVANGELISTA with good strength. Mild twitching of UE  SKIN:  no visible rashes or open areas   PSYCH:  oriented to self, place, situation, insight and judgement impaired, memory impaired, flat affect     Recent labs in [facility] reviewed by me today.       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 Twitching of her UE is likely an extrapyramidal side effect of risperidone and she appears slightly medicated today. She has been compliant with meds.   Plan: hold am dose of risperidone for 5 days, then staff to update re: delusions and behaviors. Continue risperidone ODT 0.5 mg HS, rivastigmine 3 mg bid, duloxetine, buspirone. Memory Care staff assistance with cares, meals, activities, med admin. Recheck next week.   POA agrees with plan of care.   Discussed with staff.     (F33.41) Recurrent " major depressive disorder, in partial remission  (F41.9) Anxiety  Comment: long standing and predates dementia.   Plan: continue buspirone, duloxetine, mirtazapine. Monitor symptoms. Encourage activities and socialization.     (N18.31) Stage 3a chronic kidney disease (H)  Comment: creatinine 1.08 on 7/16/2024, baseline   Plan: BMP. Avoid nephrotoxins, renal dose meds     (D64.9) Anemia, unspecified type  Comment: Hgb 11.2 on 7/16/2024, stable   Plan: CBC    (I25.10) Coronary artery disease involving native heart without angina pectoris, unspecified vessel or lesion type  Comment: no chest pain or acute symptoms   Extensive abdominal aortic calcification noted on DEXA 2020. No history of cardiac event or CVA   Plan: continue ASA, statin.     (K21.9) Gastroesophageal reflux disease, unspecified whether esophagitis present  Comment: symptoms managed.   Plan: consider decreasing pantoprazole at next visit           Electronically signed by: ENID Taylor CNP

## 2025-01-15 ENCOUNTER — ASSISTED LIVING VISIT (OUTPATIENT)
Dept: GERIATRICS | Facility: CLINIC | Age: 85
End: 2025-01-15
Payer: COMMERCIAL

## 2025-01-15 VITALS
WEIGHT: 116.4 LBS | HEART RATE: 93 BPM | BODY MASS INDEX: 22.73 KG/M2 | RESPIRATION RATE: 18 BRPM | TEMPERATURE: 97.9 F | SYSTOLIC BLOOD PRESSURE: 150 MMHG | DIASTOLIC BLOOD PRESSURE: 88 MMHG

## 2025-01-15 DIAGNOSIS — F02.B2 MODERATE LATE ONSET ALZHEIMER'S DEMENTIA WITH PSYCHOTIC DISTURBANCE (H): ICD-10-CM

## 2025-01-15 DIAGNOSIS — R44.3 HALLUCINATIONS: ICD-10-CM

## 2025-01-15 DIAGNOSIS — F22 DELUSIONS (H): ICD-10-CM

## 2025-01-15 DIAGNOSIS — G30.1 MODERATE LATE ONSET ALZHEIMER'S DEMENTIA WITH PSYCHOTIC DISTURBANCE (H): ICD-10-CM

## 2025-01-15 RX ORDER — RISPERIDONE 0.5 MG/1
0.5 TABLET, ORALLY DISINTEGRATING ORAL AT BEDTIME
Qty: 60 TABLET | Refills: 11 | Status: SHIPPED | OUTPATIENT
Start: 2025-01-15

## 2025-01-15 NOTE — PROGRESS NOTES
[facility] GERIATRICS    Chief Complaint   Patient presents with    RECHECK     HPI:  Elzbieta Ivan is a 84 year old  (1940), who is being seen today for an episodic care visit at: [facility] ([facility]) [269]. Today's concern is: ***    Allergies, and PMH/PSH reviewed in [facility] today.  REVIEW OF SYSTEMS:  {lerytj76:694992}    Objective:   BP (!) 150/88   Pulse 93   Temp 97.9  F (36.6  C)   Resp 18   Wt 52.8 kg (116 lb 6.4 oz)   BMI 22.73 kg/m    {Nursing home physical exam :728577}    {fgslab:166826}    Assessment/Plan:  {FGS DX2:707518}    MED REC REQUIRED{TIP  Click the link below to document or use med rec list, use list to pull in response :230065}  Post Medication Reconciliation Status: {MED REC LIST:673423}      Orders:  {fgsorders:927185}  ***    Electronically signed by: Jonah Hernández ***

## 2025-01-15 NOTE — LETTER
1/15/2025      Elzbieta Ivan  [address]  [place] [address]        No notes on file      Sincerely,        ENID Taylor CNP    Electronically signed

## 2025-01-16 ENCOUNTER — LAB REQUISITION (OUTPATIENT)
Dept: LAB | Facility: CLINIC | Age: 85
End: 2025-01-16
Payer: COMMERCIAL

## 2025-01-16 DIAGNOSIS — D64.9 ANEMIA, UNSPECIFIED: ICD-10-CM

## 2025-01-20 DIAGNOSIS — E78.00 PURE HYPERCHOLESTEROLEMIA: ICD-10-CM

## 2025-01-20 RX ORDER — DIPHENHYDRAMINE HYDROCHLORIDE 25 MG/1
1 TABLET ORAL DAILY
Qty: 90 CAPSULE | Refills: 97 | OUTPATIENT
Start: 2025-01-20

## 2025-01-21 LAB
ERYTHROCYTE [DISTWIDTH] IN BLOOD BY AUTOMATED COUNT: 14.7 % (ref 10–15)
HCT VFR BLD AUTO: 36.2 % (ref 35–47)
HGB BLD-MCNC: 11 G/DL (ref 11.7–15.7)
MCH RBC QN AUTO: 26.7 PG (ref 26.5–33)
MCHC RBC AUTO-ENTMCNC: 30.4 G/DL (ref 31.5–36.5)
MCV RBC AUTO: 88 FL (ref 78–100)
PLATELET # BLD AUTO: 320 10E3/UL (ref 150–450)
RBC # BLD AUTO: 4.12 10E6/UL (ref 3.8–5.2)
WBC # BLD AUTO: 8.6 10E3/UL (ref 4–11)

## 2025-01-22 LAB
ANION GAP SERPL CALCULATED.3IONS-SCNC: 10 MMOL/L (ref 7–15)
BUN SERPL-MCNC: 13.2 MG/DL (ref 8–23)
CALCIUM SERPL-MCNC: 9.5 MG/DL (ref 8.8–10.4)
CHLORIDE SERPL-SCNC: 107 MMOL/L (ref 98–107)
CREAT SERPL-MCNC: 0.92 MG/DL (ref 0.51–0.95)
EGFRCR SERPLBLD CKD-EPI 2021: 62 ML/MIN/1.73M2
GLUCOSE SERPL-MCNC: 90 MG/DL (ref 70–99)
HCO3 SERPL-SCNC: 27 MMOL/L (ref 22–29)
POTASSIUM SERPL-SCNC: 4.6 MMOL/L (ref 3.4–5.3)
SODIUM SERPL-SCNC: 144 MMOL/L (ref 135–145)

## 2025-02-13 DIAGNOSIS — F41.9 ANXIETY: ICD-10-CM

## 2025-02-13 RX ORDER — BUSPIRONE HYDROCHLORIDE 7.5 MG/1
7.5 TABLET ORAL 2 TIMES DAILY
Qty: 62 TABLET | Refills: 11 | Status: SHIPPED | OUTPATIENT
Start: 2025-02-13

## 2025-03-12 ENCOUNTER — ASSISTED LIVING VISIT (OUTPATIENT)
Dept: GERIATRICS | Facility: CLINIC | Age: 85
End: 2025-03-12
Payer: COMMERCIAL

## 2025-03-12 VITALS
TEMPERATURE: 97.7 F | SYSTOLIC BLOOD PRESSURE: 130 MMHG | HEART RATE: 89 BPM | RESPIRATION RATE: 16 BRPM | DIASTOLIC BLOOD PRESSURE: 93 MMHG | BODY MASS INDEX: 21.52 KG/M2 | WEIGHT: 110.2 LBS

## 2025-03-12 DIAGNOSIS — F41.9 ANXIETY: ICD-10-CM

## 2025-03-12 DIAGNOSIS — I25.10 CORONARY ARTERY DISEASE INVOLVING NATIVE HEART WITHOUT ANGINA PECTORIS, UNSPECIFIED VESSEL OR LESION TYPE: ICD-10-CM

## 2025-03-12 DIAGNOSIS — F02.B2 MODERATE LATE ONSET ALZHEIMER'S DEMENTIA WITH PSYCHOTIC DISTURBANCE (H): ICD-10-CM

## 2025-03-12 DIAGNOSIS — I70.0 AORTIC CALCIFICATION: ICD-10-CM

## 2025-03-12 DIAGNOSIS — K21.9 GASTROESOPHAGEAL REFLUX DISEASE, UNSPECIFIED WHETHER ESOPHAGITIS PRESENT: ICD-10-CM

## 2025-03-12 DIAGNOSIS — F33.41 RECURRENT MAJOR DEPRESSIVE DISORDER, IN PARTIAL REMISSION: ICD-10-CM

## 2025-03-12 DIAGNOSIS — G30.1 MODERATE LATE ONSET ALZHEIMER'S DEMENTIA WITH PSYCHOTIC DISTURBANCE (H): ICD-10-CM

## 2025-03-12 DIAGNOSIS — F22 DELUSIONS (H): ICD-10-CM

## 2025-03-12 DIAGNOSIS — H61.23 CERUMINOSIS, BILATERAL: Primary | ICD-10-CM

## 2025-03-12 DIAGNOSIS — R44.3 HALLUCINATIONS: ICD-10-CM

## 2025-03-12 NOTE — PROGRESS NOTES
[facility] GERIATRICS    Chief Complaint   Patient presents with    RECHECK     HPI:  Elzbieta Ivan is a 83 year old  (1940), who is being seen today for an episodic care visit at: [facility] ([facility]) [269].   She has lived at this facility since 6/2022, initially in AL then moved to Memory Care 12/2022. We assumed primary care 5/2023.   Medical history significant for dementia, major depression, anxiety, suicidal ideation, anemia, GERD, CAD, hyperlipidemia, Hashimoto's,  essential tremor, fibromyalgia, osteoarthritis, osteoporosis.   She was hospitalized on the [facility] 11/28-12/6/2022 with suicidal ideation and a plan. Cymbalta was decreased from 120 mg daily to 90 mg due to potential side effects of mood instability and irritability. Xanax was dc'd. Mirtazapine and prn seroquel were started. She returned to the facility and moved to Memory Care for a higher level of care.   She was seen by Dr Giang at [facility] 3/7/2023 and rivastigmine was started. Neuropsych testing showed memory markedly affected,  executive abilities moderately affected, verbal processing mild to moderately affected, nonverbal processing minimally affected. Findings were consistent with Alzheimer's disease.     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 Her friend and POA reported decreased hearing and asked to have her ears cleaned. She's calm and in a good mood today.  Denies pain of her ears. Feeling well. Staff reports she's been less delusional and less  anxious. She has started participating in activities and coming to the dining room for some meals. Ambulates without a device. Requires set up and cuing with dressing and bathing.       Allergies, and PMH/PSH reviewed in EPIC today    REVIEW OF SYSTEMS:  Limited due to dementia. Positives as noted under HPI    Objective:   BP (!) 130/93   Pulse 89   Temp 97.7  F (36.5  C)   Resp 16   Wt 50 kg (110 lb 3.2 oz)   BMI 21.52 kg/m    GENERAL APPEARANCE:  Alert, in no acute distress   ENT:  [place] [facility]. Both ears examined with otoscope. Both ear canals occluded with cerumen, which was removed with a curette   EYES:  conjunctiva and lids normal  RESP:  lungs clear bilaterally   CV:  regular rate and rhythm, no murmur, no LE edema   M/S:  gait steady. EVANGELISTA with good strength   SKIN:  no visible rashes or open areas   PSYCH:  oriented to self, place, situation, insight and judgement impaired, memory impaired, affect normal     Recent labs in [facility] reviewed by me today.       ASSESSMENT / PLAN:  (H61.23) Ceruminosis, bilateral  (primary encounter diagnosis)  Comment: cerumen removed from both ear canals without difficulty. 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 Hallucinations and delusions improved with risperidone. Weight is down 6-8 lbs.   Plan: continue risperidone, rivastigmine, buspirone, duloxetine. Memory Care staff assistance with cares, meals, activities, med admin    (F33.41) Recurrent major depressive disorder, in partial remission  (F41.9) Anxiety  Comment: long standing, predates dementia. Anxious at times, but doing better with current med regimen   Plan: continue meds as above     (I25.10) Coronary artery disease involving native heart without angina pectoris, unspecified vessel or lesion type  (I70.0)  Aortic calcification  Comment: no acute issues   Plan: continue ASA, statin     (K21.9) Gastroesophageal reflux disease, unspecified whether esophagitis present  Comment: symptoms appear managed. Weight loss is most likely due to progressive dementia, no signs of acute GI symptoms   Plan: continue pantoprazole 40 mg daily. Monitor symptoms, consider taper           Electronically signed by: ENID Taylor CNP

## 2025-03-12 NOTE — LETTER
3/12/2025      Elzbieta Ivan  [address]  [place] [address]          [facility] GERIATRICS    Chief Complaint   Patient presents with     RECHECK     HPI:  Elzbieta Ivan is a 83 year old  (1940), who is being seen today for an episodic care visit at: [facility]) [[id number]].   She has lived at this facility since 6/2022, initially in AL then moved to Memory Care 12/2022. We assumed primary care 5/2023.   Medical history significant for dementia, major depression, anxiety, suicidal ideation, anemia, GERD, CAD, hyperlipidemia, Hashimoto's,  essential tremor, fibromyalgia, osteoarthritis, osteoporosis.   She was hospitalized on the [facility] 11/28-12/6/2022 with suicidal ideation and a plan. Cymbalta was decreased from 120 mg daily to 90 mg due to potential side effects of mood instability and irritability. Xanax was dc'd. Mirtazapine and prn seroquel were started. She returned to the facility and moved to Memory Care for a higher level of care.   She was seen by Dr Giang at [facility] 3/7/2023 and rivastigmine was started. Neuropsych testing showed memory markedly affected,  executive abilities moderately affected, verbal processing mild to moderately affected, nonverbal processing minimally affected. Findings were consistent with Alzheimer's disease.     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 Her friend and POA reported decreased hearing and asked to have her ears cleaned. She's calm and in a good mood today.   Denies pain of her ears. Feeling well. Staff reports she's been less delusional and less anxious. She has started participating in activities and coming to the dining room for some meals. Ambulates without a device. Requires set up and cuing with dressing and bathing.       Allergies, and PMH/PSH reviewed in EPIC today    REVIEW OF SYSTEMS:  Limited due to dementia. Positives as noted under HPI    Objective:   BP (!) 130/93   Pulse 89   Temp 97.7  F (36.5  C)   Resp 16   Wt 50 kg (110 lb 3.2 oz)   BMI 21.52 kg/m    GENERAL APPEARANCE:  Alert, in no acute distress   ENT:  [place]. Both ears examined with otoscope. Both ear canals occluded with cerumen, which was removed with a curette   EYES:  conjunctiva and lids normal  RESP:  lungs clear bilaterally   CV:  regular rate and rhythm, no murmur, no LE edema   M/S:  gait steady. EVANGELISTA with good strength   SKIN:  no visible rashes or open areas   PSYCH:  oriented to self, place, situation, insight and judgement impaired, memory impaired, affect normal     Recent labs in [facility] reviewed by me today.       ASSESSMENT / PLAN:  (H61.23) Ceruminosis, bilateral  (primary encounter diagnosis)  Comment: cerumen removed from both ear canals without difficulty. 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 Hallucinations and delusions improved with risperidone. Weight is down 6-8 lbs.   Plan: continue risperidone, rivastigmine, buspirone, duloxetine. Memory Care staff assistance with cares, meals, activities, med admin    (F33.41) Recurrent major depressive disorder, in partial remission  (F41.9) Anxiety  Comment: long standing, predates dementia. Anxious at times, but doing better with current med regimen   Plan: continue meds as above     (I25.10) Coronary artery disease  involving native heart without angina pectoris, unspecified vessel or lesion type  (I70.0) Aortic calcification  Comment: no acute issues   Plan: continue ASA, statin     (K21.9) Gastroesophageal reflux disease, unspecified whether esophagitis present  Comment: symptoms appear managed. Weight loss is most likely due to progressive dementia, no signs of acute GI symptoms   Plan: continue pantoprazole 40 mg daily. Monitor symptoms, consider taper           Electronically signed by: ENID Taylor CNP                     Sincerely,        ENID Taylor CNP    Electronically signed

## 2025-03-20 DIAGNOSIS — G30.1 MODERATE LATE ONSET ALZHEIMER'S DEMENTIA WITH PSYCHOTIC DISTURBANCE (H): ICD-10-CM

## 2025-03-20 DIAGNOSIS — F02.B2 MODERATE LATE ONSET ALZHEIMER'S DEMENTIA WITH PSYCHOTIC DISTURBANCE (H): ICD-10-CM

## 2025-03-20 RX ORDER — RIVASTIGMINE TARTRATE 3 MG/1
3 CAPSULE ORAL 2 TIMES DAILY
Qty: 180 CAPSULE | Refills: 11 | Status: SHIPPED | OUTPATIENT
Start: 2025-03-20

## 2025-04-24 DIAGNOSIS — E78.5 HYPERLIPIDEMIA: Chronic | ICD-10-CM

## 2025-04-24 RX ORDER — SIMVASTATIN 40 MG
40 TABLET ORAL DAILY
Qty: 31 TABLET | Refills: 11 | Status: SHIPPED | OUTPATIENT
Start: 2025-04-24

## 2025-05-14 ENCOUNTER — ASSISTED LIVING VISIT (OUTPATIENT)
Dept: GERIATRICS | Facility: CLINIC | Age: 85
End: 2025-05-14
Payer: COMMERCIAL

## 2025-05-14 VITALS
WEIGHT: 128 LBS | HEART RATE: 96 BPM | TEMPERATURE: 97.6 F | SYSTOLIC BLOOD PRESSURE: 121 MMHG | BODY MASS INDEX: 25 KG/M2 | DIASTOLIC BLOOD PRESSURE: 88 MMHG | RESPIRATION RATE: 20 BRPM

## 2025-05-14 DIAGNOSIS — F41.9 ANXIETY: ICD-10-CM

## 2025-05-14 DIAGNOSIS — D64.9 ANEMIA, UNSPECIFIED TYPE: ICD-10-CM

## 2025-05-14 DIAGNOSIS — F02.B2 MODERATE LATE ONSET ALZHEIMER'S DEMENTIA WITH PSYCHOTIC DISTURBANCE (H): Primary | ICD-10-CM

## 2025-05-14 DIAGNOSIS — G30.1 MODERATE LATE ONSET ALZHEIMER'S DEMENTIA WITH PSYCHOTIC DISTURBANCE (H): Primary | ICD-10-CM

## 2025-05-14 DIAGNOSIS — F33.41 RECURRENT MAJOR DEPRESSIVE DISORDER, IN PARTIAL REMISSION: ICD-10-CM

## 2025-05-14 DIAGNOSIS — R44.3 HALLUCINATIONS: ICD-10-CM

## 2025-05-14 DIAGNOSIS — K21.9 GASTROESOPHAGEAL REFLUX DISEASE, UNSPECIFIED WHETHER ESOPHAGITIS PRESENT: ICD-10-CM

## 2025-05-14 DIAGNOSIS — N18.31 STAGE 3A CHRONIC KIDNEY DISEASE (H): ICD-10-CM

## 2025-05-14 DIAGNOSIS — F22 DELUSIONS (H): ICD-10-CM

## 2025-05-14 NOTE — LETTER
2025      Elzbieta Ivan  [address]  [place] [address]        Preventive Care Visit  [facility] GERIATRIC SERVICES  ENID Urena CNP, Nurse Practitioner - Gerontology  May 14, 2025  {Provider  Link to SmartSet :523790}    {PROVIDER CHARTING PREFERENCE:818065}    Radha Ruff is a 84 year old, presenting for the following:  Wellness Visit      {ROOMER if patient is in their first year of Medicare a vision screen is required click here to document the Vison screen and then refresh the note to pull in results  :113968}      HPI  ***   {MA/LPN/RN Pre-Provider Visit Orders- hCG/UA/Strep (Optional):355629}  {SUPERLIST (Optional):162275}  {additonal problems for provider to add (Optional):692301}  Advance Care Planning  {The storyboard will display whether the patient has ACP docs on file. Hover over the Code section in the storyboard to access the ACP documents. :308918}  {(AWV REQUIRED) Advance Care Planning Reviewed:252060}         No data to display                   No data to display                   No data to display                      No data to display                   No data to display                   No data to display                     No data to display                { Rooming Staff Patient needs a PHQ as part of the AWV.  Use this link to complete and then refresh the note to pull results Link to PHQ9 Assessment :616967}  {USE TO PULL IN PHQ RESULTS FOR TODAY:880194}         No data to display              Social History     Tobacco Use    Smoking status: Former     Current packs/day: 0.00     Types: Cigarettes     Start date:      Quit date:      Years since quittin.3    Smokeless tobacco: Never   Substance Use Topics    Alcohol use: Not Currently    Drug use: No     {Provider  If there are gaps in the social history shown above, please follow the link to update and then refresh the note Link to Social and Substance History  :373560}     {Mammogram Decision Support (Optional):179263}      {Link to Fracture Risk Assessment Tool (Optional):666072}    {Provider  REQUIRED FOR AWV Use the storyboard to review patient history, after sections have been marked as reviewed, refresh note to capture documentation:944652}  {Provider   REQUIRED AWV use this link to review and update sexual activity history  after section has been marked as reviewed, refresh note to capture documentation:463072}  Reviewed and updated as needed this visit by Provider                    {HISTORY OPTIONS (Optional):024064}  Current providers sharing in care for this patient include:  Patient Care Team:  Cornell Junior APRN CNP as PCP - General (Nurse Practitioner - Gerontology)  Jonah Hernández as Nursing Assistant  Assisted Living, Julia Mcdaniel MD as MD (Internal Medicine)  Cornell Junior APRN CNP as Assigned PCP    The following health maintenance items are reviewed in Epic and correct as of today:  Health Maintenance   Topic Date Due    LIPID  Never done    MICROALBUMIN  Never done    DEPRESSION ACTION PLAN  Never done    PHQ-9  Never done    FALL RISK ASSESSMENT  Never done    MEDICARE ANNUAL WELLNESS VISIT  05/10/2023    DTAP/TDAP/TD IMMUNIZATION (3 - Td or Tdap) 08/11/2024    COVID-19 Vaccine (8 - 2024-25 season) 05/14/2025    BMP  07/03/2025    HEMOGLOBIN  01/21/2026    ADVANCE CARE PLANNING  12/27/2028    DEXA  07/12/2036    INFLUENZA VACCINE  Completed    Pneumococcal Vaccine: 50+ Years  Completed    URINALYSIS  Completed    ZOSTER IMMUNIZATION  Completed    RSV VACCINE  Completed    HPV IMMUNIZATION  Aged Out    MENINGITIS IMMUNIZATION  Aged Out       {ROS Picklists (Optional):342173}     Objective   Exam  /88   Pulse 96   Temp 97.6  F (36.4  C)   Resp 20   Wt 58.1 kg (128 lb)   BMI 25.00 kg/m     Estimated body mass index is 25 kg/m  as calculated from the following:    Height as of 9/25/24: 1.524 m (5').    Weight  as of this encounter: 58.1 kg (128 lb).    Physical Exam  {Exam Choices (Optional):091904}  {Provider  The Mini-Cog is incomplete, use link to complete and refresh note Link to Mini-Cog :137450}       No data to display              {A Mini-Cog total score of 0-2 suggests the possibility of dementia, score of 3-5 suggests no dementia:756582}         Signed Electronically by: ENID Urena CNP  {Email feedback regarding this note to [email or web address]   :082337}      Sincerely,        ENID Urena CNP    Electronically signed

## 2025-05-14 NOTE — PROGRESS NOTES
Preventive Care Visit  [facility] GERIATRIC SERVICES  ENID Urena CNP, Nurse Practitioner - Gerontology  May 14, 2025  {Provider  Link to SmartSet :579235}    {PROVIDER CHARTING PREFERENCE:852536}    Radha Ruff is a 84 year old, presenting for the following:  Wellness Visit      {ROOMER if patient is in their first year of Medicare a vision screen is required click here to document the Vison screen and then refresh the note to pull in results  :221367}      HPI  ***   {MA/LPN/RN Pre-Provider Visit Orders- hCG/UA/Strep (Optional):022012}  Annual Wellness Visit   {Split Bill scripting  The purpose of this visit is to discuss your medical history and prevent health problems before you are sick. You may be responsible for a co-pay, coinsurance, or deductible if your visit today includes services such as checking on a sore throat, having an x-ray or lab test, or treating and evaluating a new or existing condition :131487}  Patient has been advised of split billing requirements and indicates understanding: {Yes and No:264302}   {Provider  Link to SmartSet :383867}     {ROOMER if patient is in their first year of Medicare a vision screen is required click here to document the Vison screen and then refresh the note to pull in results  :999070}  Health Care Directive  {The storyboard will display whether the patient has ACP docs on file. Hover over the Code section in the storyboard to access the ACP documents. :831041}  {(AWV REQUIRED) Advance Care Planning Reviewed:187415}  In general, how would you rate your overall physical health? {Physical Health:850844}  Do you have a special diet?  {Diet:342344}  { Click here to complete exercise, social connection and stress questions After questions have been completed, refresh note to pull answers into note  :951933}       No data to display              Do you see a dentist two times every year?  {Dentist:757982}  Have you been more tired than usual  "lately?  {Energy level:776807}  If you drink alcohol do you typically have >3 drinks per day or >7 drinks per week? { :510362}  Do you have a current opioid prescription? { :148625}  Do you use any other controlled substances or medications that are not prescribed by a provider? {Substance Use :572437::\"None\"}  Social History     Tobacco Use    Smoking status: Former     Current packs/day: 0.00     Types: Cigarettes     Start date:      Quit date:      Years since quittin.3    Smokeless tobacco: Never   Substance Use Topics    Alcohol use: Not Currently    Drug use: No     {Provider  If there are gaps in the social history shown above, please follow the link to update and then refresh the note Link to Social and Substance History :509997}  Needs assistance for the following daily activities: { :029117}  Which of the following safety concerns are present in your home?  { :941170::\"none identified\"}   Do you (or your family members) have any concerns about your safety while driving?  {yes/no:699902}  Do you have any of the following hearing concerns?: { :072268}  In the past 6 months, have you been bothered by leaking of urine? {yes/no:749320}     {Fall Risk REQUIRED for AWV, if date of completion is not today  Click here for Fall Risk flowsheet then refresh note to pull results :458899}       No data to display                 { Rooming Staff Patient needs a PHQ as part of the AWV.  Use this link to complete and then refresh the note to pull results Link to PHQ9 Assessment :223540}  {USE TO PULL IN PHQ RESULTS FOR TODAY:162406}       {Mammogram Decision Support (Optional):071928}      {Link to Fracture Risk Assessment Tool (Optional):677901}    {Provider  REQUIRED FOR AWV Use the storyboard to review patient history, after sections have been marked as reviewed, refresh note to capture documentation:905437}  {Provider   REQUIRED AWV use this link to review and update sexual activity history  after " section has been marked as reviewed, refresh note to capture documentation:904867}  Reviewed and updated as needed this visit by Provider                    {HISTORY OPTIONS (Optional):386450}    Current providers sharing in care for this patient include:  Patient Care Team:  Cornell Junior APRN CNP as PCP - General (Nurse Practitioner - Gerontology)  Jonah Hernández as Nursing Assistant  [facility]  Julia Worley MD as MD (Internal Medicine)  Cornell Junior APRN CNP as Assigned PCP    The following health maintenance items are reviewed in Epic and correct as of today:  Health Maintenance   Topic Date Due    LIPID  Never done    MICROALBUMIN  Never done    DEPRESSION ACTION PLAN  Never done    PHQ-9  Never done    FALL RISK ASSESSMENT  Never done    MEDICARE ANNUAL WELLNESS VISIT  05/10/2023    DTAP/TDAP/TD IMMUNIZATION (3 - Td or Tdap) 08/11/2024    COVID-19 Vaccine (8 - 2024-25 season) 05/14/2025    BMP  07/03/2025    HEMOGLOBIN  01/21/2026    ADVANCE CARE PLANNING  12/27/2028    DEXA  07/12/2036    INFLUENZA VACCINE  Completed    Pneumococcal Vaccine: 50+ Years  Completed    URINALYSIS  Completed    ZOSTER IMMUNIZATION  Completed    RSV VACCINE  Completed    HPV IMMUNIZATION  Aged Out    MENINGITIS IMMUNIZATION  Aged Out       Appropriate preventive services were discussed with this patient, including applicable screening as appropriate for fall prevention, nutrition, physical activity, Tobacco-use cessation, weight loss and cognition.  Checklist reviewing preventive services available has been given to the patient.    {Provider  The Mini-Cog is incomplete, use link to complete and refresh note Link to Mini-Cog :739125}       No data to display              {A Mini-Cog total score of 0-2 suggests the possibility of dementia, score of 3-5 suggests no dementia:926596}       {additonal problems for provider to add (Optional):135559}  Advance Care Planning  {The storyboard will  display whether the patient has ACP docs on file. Hover over the Code section in the storyboard to access the ACP documents. :107261}  {(AWV REQUIRED) Advance Care Planning Reviewed:681424}         No data to display                   No data to display                   No data to display                      No data to display                   No data to display                   No data to display                     No data to display                { Rooming Staff Patient needs a PHQ as part of the AWV.  Use this link to complete and then refresh the note to pull results Link to PHQ9 Assessment :525001}  {USE TO PULL IN PHQ RESULTS FOR TODAY:085478}         No data to display              Social History     Tobacco Use    Smoking status: Former     Current packs/day: 0.00     Types: Cigarettes     Start date:      Quit date:      Years since quittin.3    Smokeless tobacco: Never   Substance Use Topics    Alcohol use: Not Currently    Drug use: No     {Provider  If there are gaps in the social history shown above, please follow the link to update and then refresh the note Link to Social and Substance History :071319}     {Mammogram Decision Support (Optional):695312}      {Link to Fracture Risk Assessment Tool (Optional):961056}    {Provider  REQUIRED FOR AWV Use the storyboard to review patient history, after sections have been marked as reviewed, refresh note to capture documentation:225801}  {Provider   REQUIRED AWV use this link to review and update sexual activity history  after section has been marked as reviewed, refresh note to capture documentation:740260}  Reviewed and updated as needed this visit by Provider                    {HISTORY OPTIONS (Optional):193643}  Current providers sharing in care for this patient include:  Patient Care Team:  Cornell Junior APRN CNP as PCP - General (Nurse Practitioner - Gerontology)  Jonah Hernández as Nursing Assistant  [facility]  Julia Dawson MD as MD (Internal Medicine)  Cornell Junior APRN CNP as Assigned PCP    The following health maintenance items are reviewed in Epic and correct as of today:  Health Maintenance   Topic Date Due    LIPID  Never done    MICROALBUMIN  Never done    DEPRESSION ACTION PLAN  Never done    PHQ-9  Never done    FALL RISK ASSESSMENT  Never done    MEDICARE ANNUAL WELLNESS VISIT  05/10/2023    DTAP/TDAP/TD IMMUNIZATION (3 - Td or Tdap) 08/11/2024    COVID-19 Vaccine (8 - 2024-25 season) 05/14/2025    BMP  07/03/2025    HEMOGLOBIN  01/21/2026    ADVANCE CARE PLANNING  12/27/2028    DEXA  07/12/2036    INFLUENZA VACCINE  Completed    Pneumococcal Vaccine: 50+ Years  Completed    URINALYSIS  Completed    ZOSTER IMMUNIZATION  Completed    RSV VACCINE  Completed    HPV IMMUNIZATION  Aged Out    MENINGITIS IMMUNIZATION  Aged Out       {ROS Picklists (Optional):460225}     Objective    Exam  /88   Pulse 96   Temp 97.6  F (36.4  C)   Resp 20   Wt 58.1 kg (128 lb)   BMI 25.00 kg/m     Estimated body mass index is 25 kg/m  as calculated from the following:    Height as of 9/25/24: 1.524 m (5').    Weight as of this encounter: 58.1 kg (128 lb).    Physical Exam  {Exam Choices (Optional):832679}  {Provider  The Mini-Cog is incomplete, use link to complete and refresh note Link to Mini-Cog :647117}       No data to display              {A Mini-Cog total score of 0-2 suggests the possibility of dementia, score of 3-5 suggests no dementia:717161}         Signed Electronically by: ENID Urena CNP  {Email feedback regarding this note to [email or web address]   :841268}

## 2025-06-11 ENCOUNTER — ASSISTED LIVING VISIT (OUTPATIENT)
Dept: GERIATRICS | Facility: CLINIC | Age: 85
End: 2025-06-11
Payer: COMMERCIAL

## 2025-06-11 VITALS
DIASTOLIC BLOOD PRESSURE: 77 MMHG | TEMPERATURE: 97.1 F | WEIGHT: 130.6 LBS | HEART RATE: 108 BPM | RESPIRATION RATE: 20 BRPM | BODY MASS INDEX: 25.51 KG/M2 | SYSTOLIC BLOOD PRESSURE: 130 MMHG

## 2025-06-11 NOTE — PROGRESS NOTES
[facility] GERIATRICS    Chief Complaint   Patient presents with    RECHECK     HPI:  Elzbieta Ivan is a 84 year old  (1940), who is being seen today for an episodic care visit at: [facility] ([facility]) [[id number]]. Today's concern is: ***    Allergies, and PMH/PSH reviewed in [facility] today.  REVIEW OF SYSTEMS:  {ilgwlw21:709625}    Objective:   /77   Pulse 108   Temp 97.1  F (36.2  C)   Resp 20   Wt 59.2 kg (130 lb 9.6 oz)   BMI 25.51 kg/m    {Nursing home physical exam :331054}    {fgslab:261971}    Assessment/Plan:  {FGS DX2:914264}    MED REC REQUIRED{TIP  Click the link below to document or use med rec list, use list to pull in response :947067}  Post Medication Reconciliation Status: {MED REC LIST:203116}      Orders:  {fgsorders:564446}  ***    Electronically signed by: Jonah Hernández ***

## 2025-06-11 NOTE — LETTER
6/11/2025      Elzbieta Ivan  [address]  [place] [address]        No notes on file      Sincerely,        ENID Urena CNP    Electronically signed

## 2025-08-26 VITALS
TEMPERATURE: 97.8 F | DIASTOLIC BLOOD PRESSURE: 79 MMHG | WEIGHT: 136.6 LBS | RESPIRATION RATE: 19 BRPM | SYSTOLIC BLOOD PRESSURE: 116 MMHG | HEART RATE: 73 BPM | BODY MASS INDEX: 26.68 KG/M2

## 2025-08-27 ENCOUNTER — ASSISTED LIVING VISIT (OUTPATIENT)
Dept: GERIATRICS | Facility: CLINIC | Age: 85
End: 2025-08-27
Payer: COMMERCIAL

## 2025-08-27 DIAGNOSIS — F41.9 ANXIETY: ICD-10-CM

## 2025-08-27 DIAGNOSIS — G30.1 MODERATE LATE ONSET ALZHEIMER'S DEMENTIA WITH PSYCHOTIC DISTURBANCE (H): Primary | ICD-10-CM

## 2025-08-27 DIAGNOSIS — F33.41 RECURRENT MAJOR DEPRESSIVE DISORDER, IN PARTIAL REMISSION: ICD-10-CM

## 2025-08-27 DIAGNOSIS — F22 DELUSIONS (H): ICD-10-CM

## 2025-08-27 DIAGNOSIS — F02.B2 MODERATE LATE ONSET ALZHEIMER'S DEMENTIA WITH PSYCHOTIC DISTURBANCE (H): Primary | ICD-10-CM

## 2025-08-27 DIAGNOSIS — R44.3 HALLUCINATIONS: ICD-10-CM

## 2025-08-27 DIAGNOSIS — K21.9 GASTROESOPHAGEAL REFLUX DISEASE, UNSPECIFIED WHETHER ESOPHAGITIS PRESENT: ICD-10-CM

## 2025-08-27 DIAGNOSIS — N18.31 STAGE 3A CHRONIC KIDNEY DISEASE (H): ICD-10-CM

## 2025-08-27 DIAGNOSIS — I25.10 CORONARY ARTERY DISEASE INVOLVING NATIVE HEART WITHOUT ANGINA PECTORIS, UNSPECIFIED VESSEL OR LESION TYPE: ICD-10-CM

## 2025-08-27 PROCEDURE — 99349 HOME/RES VST EST MOD MDM 40: CPT | Performed by: NURSE PRACTITIONER

## (undated) DEVICE — HOOD FLYTE W/PEELAWAY 408-800-100

## (undated) DEVICE — SPONGE SURGIFOAM 100 1974

## (undated) DEVICE — BONE CLEANING TIP INTERPULSE FEMORAL CANAL 0210-008-000

## (undated) DEVICE — DRAPE SHEET REV FOLD 3/4 9349

## (undated) DEVICE — Device

## (undated) DEVICE — SOL WATER IRRIG 1000ML BOTTLE 2F7114

## (undated) DEVICE — BONE CLEANING TIP INTERPULSE  0210-010-000

## (undated) DEVICE — PREP SKIN SCRUB TRAY 4461A

## (undated) DEVICE — SU PROLENE 6-0 BV-1DA 18" 8709H

## (undated) DEVICE — NDL 30GA 1" 305128

## (undated) DEVICE — SU VICRYL 3-0 PS-1 18" UND J683

## (undated) DEVICE — DRAPE POUCH INSTRUMENT 1018

## (undated) DEVICE — SOL NACL 0.9% IRRIG 1000ML BOTTLE 07138-09

## (undated) DEVICE — GOWN XXL 9575

## (undated) DEVICE — DRSG KERLIX FLUFFS X5

## (undated) DEVICE — DRAPE CONVERTORS U-DRAPE 60X72" 8476

## (undated) DEVICE — NDL ANGIOCATH 14GA 2" 4088

## (undated) DEVICE — GLOVE PROTEXIS BLUE W/NEU-THERA 8.5  2D73EB85

## (undated) DEVICE — BONE CEMENT MIXEVAC HI VAC W/CARTRIDGE 0306-563-000

## (undated) DEVICE — DRAPE OVERHEAD TABLE

## (undated) DEVICE — DRSG ABDOMINAL 07 1/2X8" 7197D

## (undated) DEVICE — DRAPE STERI U 1015

## (undated) DEVICE — LINEN TOWEL PACK X5 5464

## (undated) DEVICE — DRAIN JACKSON PRATT RESERVOIR 100ML SU130-1305

## (undated) DEVICE — GLOVE PROTEXIS W/NEU-THERA 7.0  2D73TE70

## (undated) DEVICE — DECANTER BAG 2002S

## (undated) DEVICE — SU VICRYL 2-0 CP-1 27" J466H

## (undated) DEVICE — SU NDL MAYO TROCAR MED 217003

## (undated) DEVICE — PREP CHLORAPREP 26ML TINTED ORANGE  260815

## (undated) DEVICE — PACK SET-UP STD 9102

## (undated) DEVICE — DRILL BIT TORNIER 3MM REVERSE STERILE DWD055

## (undated) DEVICE — ESU ELEC BLADE 2.75" COATED/INSULATED E1455

## (undated) DEVICE — GLOVE PROTEXIS BLUE W/NEU-THERA 7.0  2D73EB70

## (undated) DEVICE — PACK SPINE SM CUSTOM SNE15SSFSK

## (undated) DEVICE — SOL NACL 0.9% INJ 250ML BAG 2B1322Q

## (undated) DEVICE — SU VICRYL 3-0 PS-2 18" UND J497G

## (undated) DEVICE — GLOVE PROTEXIS POWDER FREE 8.0 ORTHOPEDIC 2D73ET80

## (undated) DEVICE — SU ETHIBOND 1 CT-1 30" X425H

## (undated) DEVICE — POSITIONER PT PRONESAFE HEAD REST W/DERMAPROX INSERT 40599

## (undated) DEVICE — SUCTION TIP YANKAUER W/O VENT K86

## (undated) DEVICE — SU FIBERWIRE 2 38"  AR-7200

## (undated) DEVICE — PILOT FOR CANNULATED REAMER

## (undated) DEVICE — GLOVE PROTEXIS POWDER FREE 8.5 ORTHOPEDIC 2D73ET85

## (undated) DEVICE — SPONGE COTTONOID 1/2X3" 80-1407

## (undated) DEVICE — DRSG STERI STRIP 1/2X4" R1547

## (undated) DEVICE — BLADE KNIFE SURG 15 371115

## (undated) DEVICE — SUCTION IRR SYSTEM W/O TIP INTERPULSE HANDPIECE 0210-100-000

## (undated) DEVICE — RX SURGIFLO HEMOSTATIC MATRIX W/THROMBIN 8ML 2994

## (undated) DEVICE — BLADE KNIFE SURG 10 371110

## (undated) DEVICE — SPONGE RAY-TEC 4X8" 7318

## (undated) DEVICE — SU VICRYL 2-0 CT-2 27" J333H

## (undated) DEVICE — ESU GROUND PAD UNIVERSAL W/O CORD

## (undated) DEVICE — DRSG XEROFORM 5X9" 8884431605

## (undated) DEVICE — PACK OPEN SHOULDER SOP15OCFSC

## (undated) DEVICE — STPL SKIN PROXIMATE 35 WIDE PMW35

## (undated) DEVICE — ESU ELEC BLADE 4" COATED

## (undated) DEVICE — MIDAS REX DISSECTING TOOL  14MH30

## (undated) DEVICE — SU VICRYL 1 CT-2 27" J335H

## (undated) DEVICE — SU VICRYL 0 CTX 36" J370H

## (undated) DEVICE — SPONGE LAP 18X18" X8435

## (undated) DEVICE — GLOVE PROTEXIS W/NEU-THERA 8.0  2D73TE80

## (undated) DEVICE — GOWN IMPERVIOUS SPECIALTY XLG/XLONG 32474

## (undated) DEVICE — SUCTION FRAZIER 12FR W/HANDLE K73

## (undated) DEVICE — SPONGE KITTNER 31001010

## (undated) DEVICE — MANIFOLD NEPTUNE 4 PORT 700-20

## (undated) DEVICE — IMM SLING SHOULDER LG BUCKLE 79-84247

## (undated) DEVICE — DRAIN CHANNEL ROUND 15FR FLUTED 072188

## (undated) RX ORDER — PROPOFOL 10 MG/ML
INJECTION, EMULSION INTRAVENOUS
Status: DISPENSED
Start: 2020-02-04

## (undated) RX ORDER — NEOSTIGMINE METHYLSULFATE 1 MG/ML
VIAL (ML) INJECTION
Status: DISPENSED
Start: 2020-02-04

## (undated) RX ORDER — FENTANYL CITRATE 50 UG/ML
INJECTION, SOLUTION INTRAMUSCULAR; INTRAVENOUS
Status: DISPENSED
Start: 2020-02-04

## (undated) RX ORDER — BETAMETHASONE SODIUM PHOSPHATE AND BETAMETHASONE ACETATE 3; 3 MG/ML; MG/ML
INJECTION, SUSPENSION INTRA-ARTICULAR; INTRALESIONAL; INTRAMUSCULAR; SOFT TISSUE
Status: DISPENSED
Start: 2020-02-04

## (undated) RX ORDER — CEFAZOLIN SODIUM 2 G/100ML
INJECTION, SOLUTION INTRAVENOUS
Status: DISPENSED
Start: 2018-08-30

## (undated) RX ORDER — NEOSTIGMINE METHYLSULFATE 1 MG/ML
VIAL (ML) INJECTION
Status: DISPENSED
Start: 2018-08-30

## (undated) RX ORDER — ACETAMINOPHEN 325 MG/1
TABLET ORAL
Status: DISPENSED
Start: 2020-02-04

## (undated) RX ORDER — BUPIVACAINE HYDROCHLORIDE AND EPINEPHRINE 2.5; 5 MG/ML; UG/ML
INJECTION, SOLUTION EPIDURAL; INFILTRATION; INTRACAUDAL; PERINEURAL
Status: DISPENSED
Start: 2020-02-04

## (undated) RX ORDER — GLYCOPYRROLATE 0.2 MG/ML
INJECTION, SOLUTION INTRAMUSCULAR; INTRAVENOUS
Status: DISPENSED
Start: 2020-02-04

## (undated) RX ORDER — CEFAZOLIN SODIUM 2 G/100ML
INJECTION, SOLUTION INTRAVENOUS
Status: DISPENSED
Start: 2020-02-04

## (undated) RX ORDER — EPINEPHRINE 1 MG/ML
INJECTION, SOLUTION INTRAMUSCULAR; SUBCUTANEOUS
Status: DISPENSED
Start: 2018-08-30

## (undated) RX ORDER — PROTAMINE SULFATE 10 MG/ML
INJECTION, SOLUTION INTRAVENOUS
Status: DISPENSED
Start: 2020-02-04

## (undated) RX ORDER — ONDANSETRON 2 MG/ML
INJECTION INTRAMUSCULAR; INTRAVENOUS
Status: DISPENSED
Start: 2020-02-04

## (undated) RX ORDER — FENTANYL CITRATE 50 UG/ML
INJECTION, SOLUTION INTRAMUSCULAR; INTRAVENOUS
Status: DISPENSED
Start: 2018-08-30

## (undated) RX ORDER — LIDOCAINE HYDROCHLORIDE 10 MG/ML
INJECTION, SOLUTION EPIDURAL; INFILTRATION; INTRACAUDAL; PERINEURAL
Status: DISPENSED
Start: 2020-02-04

## (undated) RX ORDER — HYDROMORPHONE HYDROCHLORIDE 1 MG/ML
INJECTION, SOLUTION INTRAMUSCULAR; INTRAVENOUS; SUBCUTANEOUS
Status: DISPENSED
Start: 2020-02-04

## (undated) RX ORDER — GABAPENTIN 100 MG/1
CAPSULE ORAL
Status: DISPENSED
Start: 2020-02-04

## (undated) RX ORDER — HEPARIN SODIUM 1000 [USP'U]/ML
INJECTION, SOLUTION INTRAVENOUS; SUBCUTANEOUS
Status: DISPENSED
Start: 2020-02-04

## (undated) RX ORDER — CEFAZOLIN SODIUM 1 G/3ML
INJECTION, POWDER, FOR SOLUTION INTRAMUSCULAR; INTRAVENOUS
Status: DISPENSED
Start: 2018-08-30

## (undated) RX ORDER — LIDOCAINE HYDROCHLORIDE 20 MG/ML
INJECTION, SOLUTION EPIDURAL; INFILTRATION; INTRACAUDAL; PERINEURAL
Status: DISPENSED
Start: 2020-02-04